# Patient Record
Sex: FEMALE | Race: WHITE | NOT HISPANIC OR LATINO | Employment: OTHER | URBAN - METROPOLITAN AREA
[De-identification: names, ages, dates, MRNs, and addresses within clinical notes are randomized per-mention and may not be internally consistent; named-entity substitution may affect disease eponyms.]

---

## 2017-01-03 ENCOUNTER — OFFICE VISIT (OUTPATIENT)
Dept: CARDIOLOGY | Facility: MEDICAL CENTER | Age: 82
End: 2017-01-03
Payer: MEDICARE

## 2017-01-03 VITALS
HEART RATE: 80 BPM | BODY MASS INDEX: 25.49 KG/M2 | HEIGHT: 65 IN | DIASTOLIC BLOOD PRESSURE: 78 MMHG | SYSTOLIC BLOOD PRESSURE: 150 MMHG | WEIGHT: 153 LBS | OXYGEN SATURATION: 98 %

## 2017-01-03 DIAGNOSIS — E02 SUBCLINICAL IODINE-DEFICIENCY HYPOTHYROIDISM: ICD-10-CM

## 2017-01-03 DIAGNOSIS — I10 ESSENTIAL HYPERTENSION: ICD-10-CM

## 2017-01-03 DIAGNOSIS — R00.2 PALPITATIONS: ICD-10-CM

## 2017-01-03 DIAGNOSIS — I63.039 CEREBROVASCULAR ACCIDENT (CVA) DUE TO THROMBOSIS OF CAROTID ARTERY, UNSPECIFIED BLOOD VESSEL LATERALITY (HCC): ICD-10-CM

## 2017-01-03 LAB — EKG IMPRESSION: NORMAL

## 2017-01-03 PROCEDURE — G8420 CALC BMI NORM PARAMETERS: HCPCS | Performed by: INTERNAL MEDICINE

## 2017-01-03 PROCEDURE — G8599 NO ASA/ANTIPLAT THER USE RNG: HCPCS | Performed by: INTERNAL MEDICINE

## 2017-01-03 PROCEDURE — 1036F TOBACCO NON-USER: CPT | Performed by: INTERNAL MEDICINE

## 2017-01-03 PROCEDURE — G8427 DOCREV CUR MEDS BY ELIG CLIN: HCPCS | Performed by: INTERNAL MEDICINE

## 2017-01-03 PROCEDURE — 99214 OFFICE O/P EST MOD 30 MIN: CPT | Performed by: INTERNAL MEDICINE

## 2017-01-03 RX ORDER — CHOLECALCIFEROL (VITAMIN D3) 1250 MCG
CAPSULE ORAL
COMMUNITY
End: 2017-08-17

## 2017-01-03 NOTE — Clinical Note
[facility]   [address], [address]  [place] [address]  Phone: [phone]  Fax: [phone]              Afshan Love  7/25/1927    Encounter Date: 1/3/2017    Moise Curtis M.D.          PROGRESS NOTE:  Subjective:   Afshan Love is a 89 y.o. female who presents today here for a second opinion secondary  to TIA and possible CVA possibly related to a fib. No palps. No syncope or presyncope. Minimal chest pain. Not taking her ASA. Event recorder without a fib. Feels well.     Past Medical History   Diagnosis Date   • Arthritis    • Unspecified disorder of thyroid    • CATARACT    • Snoring      sleep study done   • Sleep apnea      stopped cpap due to sinus problems   • Bronchitis    • Bruxism    • COPD (chronic obstructive pulmonary disease) (HCC)    • Pneumonia    • Restless leg syndrome    • Chickenpox    • Moroccan measles    • Hyperlipidemia    • Fibromyalgia      Past Surgical History   Procedure Laterality Date   • Appendectomy     • Thyroidectomy     • Other       eyelids fixed drooped below pupil   • Tonsillectomy and adenoidectomy     • Femur orif  5/22/2013     Performed by Johan Tsai M.D. at SURGERY [facility] ORS   • Cataract phaco with iol  12/11/2013     Performed by Oma Dockery M.D. at SURGERY SAME DAY [facility] ORS   • Pr remv 2nd cataract,corn-scler sectn     • Hip replacement, total       Family History   Problem Relation Age of Onset   • Cancer Sister    • Sleep Apnea Neg Hx    • Psychiatry Father      suicide     History   Smoking status   • Former Smoker -- 1.00 packs/day for 20 years   • Types: Cigarettes   • Quit date: 05/21/1961   Smokeless tobacco   • Never Used     Allergies   Allergen Reactions   • Nkda [No Known Drug Allergy]    • Other Environmental      Smoke, perfume     Outpatient Encounter Prescriptions as of 1/3/2017   Medication Sig Dispense Refill   • Cholecalciferol (VITAMIN D3) 29456 UNITS Cap Take  by  "mouth.     • BIOTIN PO Take  by mouth.     • levothyroxine (SYNTHROID) 75 MCG Tab Take 1 Tab by mouth Every morning on an empty stomach. 90 Tab 1   • cyanocobalamin (VITAMIN B-12) 1000 MCG/ML Solution INJECT 1 ML INTRAMUSCULARLY EVERY MONTH AS DIRECTED 1 Vial 11   • vitamin D, Ergocalciferol, (DRISDOL) 70642 UNITS Cap capsule Take 50,000 Units by mouth.  2   • GAVILYTE-C 240 G solution TAKE 240 ML BY MOUTH EVERY 15 MINUTES FOR 16 DOSES  0   • aspirin (ASA) 325 MG Tab Take 1 Tab by mouth every day. 30 Tab 3   • Non Formulary Request Take 2 Tabs by mouth 2 Times a Day. \"Chaniflow Chinese drug for constipation\"       No facility-administered encounter medications on file as of 1/3/2017.     ROS     Objective:   /78 mmHg  Pulse 80  Ht 1.651 m (5' 5\")  Wt 69.4 kg (153 lb)  BMI 25.46 kg/m2  SpO2 98%    Physical Exam   Constitutional: She is oriented to person, place, and time. She appears well-developed and well-nourished. No distress.   HENT:   Mouth/Throat: Oropharynx is clear and moist.   Eyes: Conjunctivae and EOM are normal.   Neck: Neck supple. No JVD present. No thyroid mass present.   Cardiovascular: Normal rate, regular rhythm, S1 normal, S2 normal and normal pulses.  PMI is not displaced.  Exam reveals no gallop.    No murmur heard.  Pulses:       Carotid pulses are 2+ on the right side, and 2+ on the left side.       Radial pulses are 2+ on the right side, and 2+ on the left side.        Femoral pulses are 2+ on the right side, and 2+ on the left side.       Dorsalis pedis pulses are 2+ on the right side, and 2+ on the left side.   No peripheral edema.   Pulmonary/Chest: Effort normal and breath sounds normal.   Abdominal: Soft. Normal appearance. She exhibits no abdominal bruit and no mass. There is no hepatosplenomegaly. There is no tenderness.   Musculoskeletal: Normal range of motion. She exhibits no edema.        Lumbar back: She exhibits no tenderness and no spasm.   Neurological: She is alert " and oriented to person, place, and time. She has normal strength.   Skin: Skin is warm and dry. No rash noted. No cyanosis. Nails show no clubbing.   Psychiatric: She has a normal mood and affect.       Assessment:     1. Palpitations  EKG   2. Essential hypertension  EKG   3. Cerebrovascular accident (CVA) due to thrombosis of carotid artery, unspecified blood vessel laterality (HCC)     4. Subclinical iodine-deficiency hypothyroidism         Medical Decision Making:  Today's Assessment / Status / Plan:     1. 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 Discussed use of ILR to see if has a fib and needs anticoagulation. She wishes to think about it. Also discussed the pathophysiology of a fib in causing CVA's.  2. F/u with PAB in 1 month.  3. Restart ASA.      Caitlin Rojas D.O.  [address]  [place] [address]  VIA In Basket     Damaris Curtis PA-C  [address]  [place] [address]  VIA In Basket

## 2017-01-03 NOTE — MR AVS SNAPSHOT
"Afshan Love   1/3/2017 9:00 AM   Office Visit   MRN: [id number]    Department:  [facility]   Dept Phone:  [phone]    Description:  Female : 1927   Provider:  Moise Curtis M.D.           Reason for Visit     New Patient           Allergies as of 1/3/2017     Allergen Noted Reactions    Nkda [No Known Drug Allergy] 05/10/2008       Other Environmental 12/10/2013       Smoke, perfume      You were diagnosed with     Palpitations   [785.1.ICD-9-CM]       Essential hypertension   [6848596]       Cerebrovascular accident (CVA) due to thrombosis of carotid artery, unspecified blood vessel laterality (HCC)   [4013844]       Subclinical iodine-deficiency hypothyroidism   [123949]         Vital Signs     Blood Pressure Pulse Height Weight Body Mass Index Oxygen Saturation    150/78 mmHg 80 1.651 m (5' 5\") 69.4 kg (153 lb) 25.46 kg/m2 98%    Smoking Status                   Former Smoker           Basic Information     Date Of Birth Sex Race Ethnicity Preferred Language    1927 Female White Non- English      Your appointments     Feb 15, 2017 10:40 AM   FOLLOW UP with [facility] (--)    [address], [facility] [address]  [facility] [address] [phone]            2017 10:00 AM   Established Patient with [facility]    [address] [facility] [address] [phone]           You will be receiving a confirmation call a few days before your appointment from our automated call confirmation system.              Problem List              ICD-10-CM Priority Class Noted - Resolved    Femur fracture, left (HCC) S72.92XA   2013 - Present    Hypothyroid E03.9   2013 - Present    HTN (hypertension) I10   2013 - Present    Fall W19.XXXA   2013 - Present    Cerebrovascular accident (CVA) due to embolism of cerebral artery (HCC) I63.40   3/30/2016 - Present "    Stroke (HCC) I63.9   4/27/2016 - Present    Hyperlipidemia LDL goal <70 E78.5   4/27/2016 - Present    Palpitations R00.2   4/27/2016 - Present    Obstructive sleep apnea syndrome G47.33   8/25/2016 - Present    Blunt trauma of sternum S29.8XXA   12/5/2016 - Present    Low serum vitamin D R79.89   12/5/2016 - Present      Health Maintenance        Date Due Completion Dates    IMM DTaP/Tdap/Td Vaccine (1 - Tdap) 7/25/1946 ---    PAP SMEAR 7/25/1948 ---    COLONOSCOPY 7/25/1977 ---    IMM ZOSTER VACCINE 7/25/1987 ---    IMM PNEUMOCOCCAL 65+ (ADULT) LOW/MEDIUM RISK SERIES (2 of 2 - PCV13) 11/21/2002 11/21/2001    MAMMOGRAM 1/7/2015 1/7/2014, 9/25/2008, 9/25/2008, 2/1/2006, 3/17/2004    BONE DENSITY 4/28/2016 4/28/2011, 10/20/2008            Results       Current Immunizations     Influenza TIV (IM) 11/19/2012    Influenza Vaccine Adult HD 12/5/2016    Pneumococcal polysaccharide vaccine (PPSV-23) 11/21/2001      Below and/or attached are the medications your provider expects you to take. Review all of your home medications and newly ordered medications with your provider and/or pharmacist. Follow medication instructions as directed by your provider and/or pharmacist. Please keep your medication list with you and share with your provider. Update the information when medications are discontinued, doses are changed, or new medications (including over-the-counter products) are added; and carry medication information at all times in the event of emergency situations     Allergies:  NKDA - (reactions not documented)     OTHER ENVIRONMENTAL - (reactions not documented)               Medications  Valid as of: January 03, 2017 -  9:35 AM    Generic Name Brand Name Tablet Size Instructions for use    Aspirin (Tab)  MG Take 1 Tab by mouth every day.        Biotin   Take  by mouth.        Cholecalciferol (Cap) Vitamin D3 62907 UNITS Take  by mouth.        Cyanocobalamin (Solution) VITAMIN B-12 1000 MCG/ML INJECT 1 ML  "INTRAMUSCULARLY EVERY MONTH AS DIRECTED        Ergocalciferol (Cap) DRISDOL 56894 UNITS Take 50,000 Units by mouth.        Levothyroxine Sodium (Tab) SYNTHROID 75 MCG Take 1 Tab by mouth Every morning on an empty stomach.        Non Formulary Request Non Formulary Request  Take 2 Tabs by mouth 2 Times a Day. \"Chaniflow Chinese drug for constipation\"        PEG 3350-KCl-NaBcb-NaCl-NaSulf (Recon Soln) GAVILYTE-C 240 G TAKE 240 ML BY MOUTH EVERY 15 MINUTES FOR 16 DOSES        .                 Medicines prescribed today were sent to:     [facility]/PHARMACY #[id number] - [place], [place] - [address] [facility]    [address] [facility] [place] [address]    Phone: [phone] Fax: [phone]    Open 24 Hours?: No      Medication refill instructions:       If your prescription bottle indicates you have medication refills left, it is not necessary to call your provider’s office. Please contact your pharmacy and they will refill your medication.    If your prescription bottle indicates you do not have any refills left, you may request refills at any time through one of the following ways: The online [employer] system (except Urgent Care), by calling your provider’s office, or by asking your pharmacy to contact your provider’s office with a refill request. Medication refills are processed only during regular business hours and may not be available until the next business day. Your provider may request additional information or to have a follow-up visit with you prior to refilling your medication.   *Please Note: Medication refills are assigned a new Rx number when refilled electronically. Your pharmacy may indicate that no refills were authorized even though a new prescription for the same medication is available at the pharmacy. Please request the medicine by name with the pharmacy before contacting your provider for a refill.           MyChart Status: Patient Declined        "

## 2017-01-03 NOTE — PROGRESS NOTES
Subjective:   Afshan Love is a 89 y.o. female who presents today here for a second opinion secondary  to TIA and possible CVA possibly related to a fib. No palps. No syncope or presyncope. Minimal chest pain. Not taking her ASA. Event recorder without a fib. Feels well.     Past Medical History   Diagnosis Date   • Arthritis    • Unspecified disorder of thyroid    • CATARACT    • Snoring      sleep study done   • Sleep apnea      stopped cpap due to sinus problems   • Bronchitis    • Bruxism    • COPD (chronic obstructive pulmonary disease) ([facility])    • Pneumonia    • Restless leg syndrome    • Chickenpox    • Thai measles    • Hyperlipidemia    • Fibromyalgia      Past Surgical History   Procedure Laterality Date   • Appendectomy     • Thyroidectomy     • Other       eyelids fixed drooped below pupil   • Tonsillectomy and adenoidectomy     • Femur orif  5/22/2013     Performed by Johan Tsai M.D. at SURGERY [facility]   • Cataract phaco with iol  12/11/2013     Performed by Oma Dockery M.D. at SURGERY SAME DAY [facility] ORS   • Pr remv 2nd cataract,corn-scler sectn     • Hip replacement, total       Family History   Problem Relation Age of Onset   • Cancer Sister    • Sleep Apnea Neg Hx    • Psychiatry Father      suicide     History   Smoking status   • Former Smoker -- 1.00 packs/day for 20 years   • Types: Cigarettes   • Quit date: 05/21/1961   Smokeless tobacco   • Never Used     Allergies   Allergen Reactions   • Nkda [No Known Drug Allergy]    • Other Environmental      Smoke, perfume     Outpatient Encounter Prescriptions as of 1/3/2017   Medication Sig Dispense Refill   • Cholecalciferol (VITAMIN D3) 21053 UNITS Cap Take  by mouth.     • BIOTIN PO Take  by mouth.     • levothyroxine (SYNTHROID) 75 MCG Tab Take 1 Tab by mouth Every morning on an empty stomach. 90 Tab 1   • cyanocobalamin (VITAMIN B-12) 1000 MCG/ML Solution INJECT 1 ML INTRAMUSCULARLY EVERY MONTH AS DIRECTED 1 Vial 11   •  "vitamin D, Ergocalciferol, (DRISDOL) 48049 UNITS Cap capsule Take 50,000 Units by mouth.  2   • GAVILYTE-C 240 G solution TAKE 240 ML BY MOUTH EVERY 15 MINUTES FOR 16 DOSES  0   • aspirin (ASA) 325 MG Tab Take 1 Tab by mouth every day. 30 Tab 3   • Non Formulary Request Take 2 Tabs by mouth 2 Times a Day. \"Chaniflow Chinese drug for constipation\"       No facility-administered encounter medications on file as of 1/3/2017.     ROS     Objective:   /78 mmHg  Pulse 80  Ht 1.651 m (5' 5\")  Wt 69.4 kg (153 lb)  BMI 25.46 kg/m2  SpO2 98%    Physical Exam   Constitutional: She is oriented to person, place, and time. She appears well-developed and well-nourished. No distress.   HENT:   Mouth/Throat: Oropharynx is clear and moist.   Eyes: Conjunctivae and EOM are normal.   Neck: Neck supple. No JVD present. No thyroid mass present.   Cardiovascular: Normal rate, regular rhythm, S1 normal, S2 normal and normal pulses.  PMI is not displaced.  Exam reveals no gallop.    No murmur heard.  Pulses:       Carotid pulses are 2+ on the right side, and 2+ on the left side.       Radial pulses are 2+ on the right side, and 2+ on the left side.        Femoral pulses are 2+ on the right side, and 2+ on the left side.       Dorsalis pedis pulses are 2+ on the right side, and 2+ on the left side.   No peripheral edema.   Pulmonary/Chest: Effort normal and breath sounds normal.   Abdominal: Soft. Normal appearance. She exhibits no abdominal bruit and no mass. There is no hepatosplenomegaly. There is no tenderness.   Musculoskeletal: Normal range of motion. She exhibits no edema.        Lumbar back: She exhibits no tenderness and no spasm.   Neurological: She is alert and oriented to person, place, and time. She has normal strength.   Skin: Skin is warm and dry. No rash noted. No cyanosis. Nails show no clubbing.   Psychiatric: She has a normal mood and affect.       Assessment:     1. Palpitations  EKG   2. Essential hypertension "  EKG   3. Cerebrovascular accident (CVA) due to thrombosis of carotid artery, unspecified blood vessel laterality (HCC)     4. Subclinical iodine-deficiency hypothyroidism         Medical Decision Making:  Today's Assessment / Status / Plan:     1. 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 Discussed use of ILR to see if has a fib and needs anticoagulation. She wishes to think about it. Also discussed the pathophysiology of a fib in causing CVA's.  2. F/u with PAB in 1 month.  3. Restart ASA.

## 2017-02-15 ENCOUNTER — OFFICE VISIT (OUTPATIENT)
Dept: CARDIOLOGY | Facility: MEDICAL CENTER | Age: 82
End: 2017-02-15
Payer: MEDICARE

## 2017-02-15 ENCOUNTER — TELEPHONE (OUTPATIENT)
Dept: CARDIOLOGY | Facility: MEDICAL CENTER | Age: 82
End: 2017-02-15

## 2017-02-15 VITALS
HEIGHT: 65 IN | WEIGHT: 156 LBS | DIASTOLIC BLOOD PRESSURE: 68 MMHG | OXYGEN SATURATION: 96 % | BODY MASS INDEX: 25.99 KG/M2 | SYSTOLIC BLOOD PRESSURE: 138 MMHG | HEART RATE: 72 BPM

## 2017-02-15 DIAGNOSIS — I63.429 CEREBROVASCULAR ACCIDENT (CVA) DUE TO EMBOLISM OF ANTERIOR CEREBRAL ARTERY, UNSPECIFIED BLOOD VESSEL LATERALITY (HCC): ICD-10-CM

## 2017-02-15 DIAGNOSIS — E78.5 HYPERLIPIDEMIA LDL GOAL <70: ICD-10-CM

## 2017-02-15 PROCEDURE — G8420 CALC BMI NORM PARAMETERS: HCPCS | Performed by: NURSE PRACTITIONER

## 2017-02-15 PROCEDURE — 4040F PNEUMOC VAC/ADMIN/RCVD: CPT | Performed by: NURSE PRACTITIONER

## 2017-02-15 PROCEDURE — G8482 FLU IMMUNIZE ORDER/ADMIN: HCPCS | Performed by: NURSE PRACTITIONER

## 2017-02-15 PROCEDURE — G8599 NO ASA/ANTIPLAT THER USE RNG: HCPCS | Performed by: NURSE PRACTITIONER

## 2017-02-15 PROCEDURE — 99214 OFFICE O/P EST MOD 30 MIN: CPT | Performed by: NURSE PRACTITIONER

## 2017-02-15 PROCEDURE — 1101F PT FALLS ASSESS-DOCD LE1/YR: CPT | Performed by: NURSE PRACTITIONER

## 2017-02-15 PROCEDURE — 1036F TOBACCO NON-USER: CPT | Performed by: NURSE PRACTITIONER

## 2017-02-15 PROCEDURE — G8432 DEP SCR NOT DOC, RNG: HCPCS | Performed by: NURSE PRACTITIONER

## 2017-02-15 ASSESSMENT — ENCOUNTER SYMPTOMS
WEIGHT LOSS: 0
ABDOMINAL PAIN: 0
FEVER: 0
VOMITING: 0
PALPITATIONS: 0
PSYCHIATRIC NEGATIVE: 1
HEADACHES: 0
BLURRED VISION: 0
CLAUDICATION: 0
SORE THROAT: 0
SHORTNESS OF BREATH: 0
SPEECH CHANGE: 0
CHILLS: 0
WHEEZING: 0
ORTHOPNEA: 0
SPUTUM PRODUCTION: 0
PND: 0
LOSS OF CONSCIOUSNESS: 0
COUGH: 0
NAUSEA: 0
HEARTBURN: 0
DIZZINESS: 0
FOCAL WEAKNESS: 0
MUSCULOSKELETAL NEGATIVE: 1
FLANK PAIN: 0
DOUBLE VISION: 0
SEIZURES: 0

## 2017-02-15 NOTE — MR AVS SNAPSHOT
"        Afshan Peñail   2/15/2017 10:40 AM   Office Visit   MRN: [id number]    Department:  [facility]   Dept Phone:  [phone]    Description:  Female : 1927   Provider:  [facility]           Reason for Visit     Follow-Up           Allergies as of 2/15/2017     Allergen Noted Reactions    Nkda [No Known Drug Allergy] 05/10/2008       Other Environmental 12/10/2013       Smoke, perfume      Vital Signs     Blood Pressure Pulse Height Weight Body Mass Index Oxygen Saturation    138/68 mmHg 72 1.651 m (5' 5\") 70.761 kg (156 lb) 25.96 kg/m2 96%    Smoking Status                   Former Smoker           Basic Information     Date Of Birth Sex Race Ethnicity Preferred Language    1927 Female White Non- English      Your appointments     2017 10:00 AM   Established Patient with Caitlin Rojas D.O.   [facility] ([place])    [address] [place] [id number] [phone]           You will be receiving a confirmation call a few days before your appointment from our automated call confirmation system.              Problem List              ICD-10-CM Priority Class Noted - Resolved    Femur fracture, left (CMS-HCC) S72.92XA   2013 - Present    Hypothyroid E03.9   2013 - Present    HTN (hypertension) I10   2013 - Present    Fall W19.XXXA   2013 - Present    Cerebrovascular accident (CVA) due to embolism of cerebral artery (CMS-HCC) I63.40   3/30/2016 - Present    Stroke (CMS-HCC) I63.9   2016 - Present    Hyperlipidemia LDL goal <70 E78.5   2016 - Present    Palpitations R00.2   2016 - Present    Obstructive sleep apnea syndrome G47.33   2016 - Present    Blunt trauma of sternum S29.8XXA   2016 - Present    Low serum vitamin D R79.89   2016 - Present      Health Maintenance        Date Due Completion Dates    IMM DTaP/Tdap/Td Vaccine (1 - Tdap) 1946 ---    PAP SMEAR 1948 ---    COLONOSCOPY " "7/25/1977 ---    IMM ZOSTER VACCINE 7/25/1987 ---    IMM PNEUMOCOCCAL 65+ (ADULT) LOW/MEDIUM RISK SERIES (2 of 2 - PCV13) 11/21/2002 11/21/2001    MAMMOGRAM 1/7/2015 1/7/2014, 9/25/2008, 9/25/2008, 2/1/2006, 3/17/2004    BONE DENSITY 4/28/2016 4/28/2011, 10/20/2008            Current Immunizations     Influenza TIV (IM) 11/19/2012    Influenza Vaccine Adult HD 12/5/2016    Pneumococcal polysaccharide vaccine (PPSV-23) 11/21/2001      Below and/or attached are the medications your provider expects you to take. Review all of your home medications and newly ordered medications with your provider and/or pharmacist. Follow medication instructions as directed by your provider and/or pharmacist. Please keep your medication list with you and share with your provider. Update the information when medications are discontinued, doses are changed, or new medications (including over-the-counter products) are added; and carry medication information at all times in the event of emergency situations     Allergies:  NKDA - (reactions not documented)     OTHER ENVIRONMENTAL - (reactions not documented)               Medications  Valid as of: February 15, 2017 - 11:23 AM    Generic Name Brand Name Tablet Size Instructions for use    Aspirin (Tab)  MG Take 1 Tab by mouth every day.        Biotin   Take  by mouth.        Cholecalciferol (Cap) Vitamin D3 66445 UNITS Take  by mouth.        Coenzyme Q10   Take  by mouth.        Cyanocobalamin (Solution) VITAMIN B-12 1000 MCG/ML INJECT 1 ML INTRAMUSCULARLY EVERY MONTH AS DIRECTED        Ergocalciferol (Cap) DRISDOL 68023 UNITS Take 50,000 Units by mouth.        Levothyroxine Sodium (Tab) SYNTHROID 75 MCG Take 1 Tab by mouth Every morning on an empty stomach.        Non Formulary Request Non Formulary Request  Take 2 Tabs by mouth 2 Times a Day. \"Chaniflow Chinese drug for constipation\"        PEG 3350-KCl-NaBcb-NaCl-NaSulf (Recon Soln) GAVILYTE-C 240 G TAKE 240 ML BY MOUTH EVERY 15 " MINUTES FOR 16 DOSES        .                 Medicines prescribed today were sent to:     [facility]/PHARMACY #[id number] - [place], [place] - [address]    [address] [place] [address]    Phone: [phone] Fax: [phone]    Open 24 Hours?: No      Medication refill instructions:       If your prescription bottle indicates you have medication refills left, it is not necessary to call your provider’s office. Please contact your pharmacy and they will refill your medication.    If your prescription bottle indicates you do not have any refills left, you may request refills at any time through one of the following ways: The online [employer] system (except Urgent Care), by calling your provider’s office, or by asking your pharmacy to contact your provider’s office with a refill request. Medication refills are processed only during regular business hours and may not be available until the next business day. Your provider may request additional information or to have a follow-up visit with you prior to refilling your medication.   *Please Note: Medication refills are assigned a new Rx number when refilled electronically. Your pharmacy may indicate that no refills were authorized even though a new prescription for the same medication is available at the pharmacy. Please request the medicine by name with the pharmacy before contacting your provider for a refill.           [employer] Status: Patient Declined

## 2017-02-15 NOTE — PROGRESS NOTES
Subjective:   Afshan Love is a 89 y.o. female who presents today for review of her cardiac rhythm status.  She was seen by Dr Curtis back in January and he recommended ILR to document whether PAF may have been the etiology of her TIA back in March of 2016.  She was to think about it and come back for reconsideration and timing of implantation.  Previous studies with TIA on 3/31/16 reflect the following:  CTA of neck  Impression        1. There is mild atherosclerosis at the origin of both internal carotid arteries but no significant stenosis is identified. There is no occlusion.  2.  There is no carotid artery or vertebral artery dissection.  3.  There are small subpleural nodules in both upper lobes as well as small groundglass nodules. These are most likely postinflammatory.       CT of brain  Impression        1. No acute abnormality.  2.  Moderate cerebral atrophy.  3.  Moderate chronic microvascular ischemic disease.     Echocardiography Laboratory  CONCLUSIONS  Normal left ventricular systolic function.  Left ventricular ejection fraction is visually estimated to be 60%.  Mild concentric left ventricular hypertrophy.  Aortic sclerosis without stenosis.  Mild aortic insufficiency.  Mild mitral annular calcification.  Thickened mitral valve leaflets with normal leaflet excursion with mild   prolapse of the posterior mitral valve leaflet.  Trace mitral regurgitation.  Mildly dilated left atrium.  Right heart pressures are normal.    AFSHAN LOVE  Exam Date:         03/31/2016                 Past Medical History   Diagnosis Date   • Arthritis    • Unspecified disorder of thyroid    • CATARACT    • Snoring      sleep study done   • Sleep apnea      stopped cpap due to sinus problems   • Bronchitis    • Bruxism    • COPD (chronic obstructive pulmonary disease) (CMS-[facility])    • Pneumonia    • Restless leg syndrome    • Chickenpox    • Urdu measles    • Hyperlipidemia    • Fibromyalgia      Past Surgical  "History   Procedure Laterality Date   • Appendectomy     • Thyroidectomy     • Other       eyelids fixed drooped below pupil   • Tonsillectomy and adenoidectomy     • Femur orif  5/22/2013     Performed by Johan Tsai M.D. at SURGERY [facility]   • Cataract phaco with iol  12/11/2013     Performed by Oma Dockery M.D. at SURGERY SAME DAY [facility] ORS   • Pr remv 2nd cataract,corn-scler sectn     • Hip replacement, total       Family History   Problem Relation Age of Onset   • Cancer Sister    • Sleep Apnea Neg Hx    • Psychiatry Father      suicide     History   Smoking status   • Former Smoker -- 1.00 packs/day for 20 years   • Types: Cigarettes   • Quit date: 05/21/1961   Smokeless tobacco   • Never Used     Allergies   Allergen Reactions   • Nkda [No Known Drug Allergy]    • Other Environmental      Smoke, perfume     Outpatient Encounter Prescriptions as of 2/15/2017   Medication Sig Dispense Refill   • Coenzyme Q10 (CO Q 10 PO) Take  by mouth.     • BIOTIN PO Take  by mouth.     • levothyroxine (SYNTHROID) 75 MCG Tab Take 1 Tab by mouth Every morning on an empty stomach. (Patient taking differently: Take 100 mcg by mouth Every morning on an empty stomach.) 90 Tab 1   • cyanocobalamin (VITAMIN B-12) 1000 MCG/ML Solution INJECT 1 ML INTRAMUSCULARLY EVERY MONTH AS DIRECTED 1 Vial 11   • vitamin D, Ergocalciferol, (DRISDOL) 30532 UNITS Cap capsule Take 50,000 Units by mouth.  2   • Non Formulary Request Take 2 Tabs by mouth 2 Times a Day. \"Chaniflow Chinese drug for constipation\"     • Cholecalciferol (VITAMIN D3) 31045 UNITS Cap Take  by mouth.     • GAVILYTE-C 240 G solution TAKE 240 ML BY MOUTH EVERY 15 MINUTES FOR 16 DOSES  0   • aspirin (ASA) 325 MG Tab Take 1 Tab by mouth every day. (Patient not taking: Reported on 2/15/2017) 30 Tab 3     No facility-administered encounter medications on file as of 2/15/2017.     Review of Systems   Constitutional: Positive for malaise/fatigue. Negative for fever, " "chills and weight loss.   HENT: Negative for congestion and sore throat.    Eyes: Negative for blurred vision and double vision.   Respiratory: Negative for cough, sputum production, shortness of breath and wheezing.    Cardiovascular: Negative for chest pain, palpitations, orthopnea, claudication, leg swelling and PND.   Gastrointestinal: Negative for heartburn, nausea, vomiting and abdominal pain.   Genitourinary: Negative for dysuria, frequency and flank pain.   Musculoskeletal: Negative.    Skin: Negative.    Neurological: Negative for dizziness, speech change, focal weakness, seizures, loss of consciousness and headaches.   Endo/Heme/Allergies: Negative.    Psychiatric/Behavioral: Negative.         Objective:   /68 mmHg  Pulse 72  Ht 1.651 m (5' 5\")  Wt 70.761 kg (156 lb)  BMI 25.96 kg/m2  SpO2 96%    Physical Exam   Constitutional: She is oriented to person, place, and time. She appears well-developed and well-nourished.   HENT:   Head: Normocephalic and atraumatic.   Eyes: Pupils are equal, round, and reactive to light.   Neck: Normal range of motion. Neck supple. No thyromegaly present.   Cardiovascular: Normal rate, regular rhythm, normal heart sounds and intact distal pulses.  Exam reveals no gallop and no friction rub.    No murmur heard.  Pulmonary/Chest: Effort normal and breath sounds normal. No respiratory distress. She has no wheezes. She has no rales. She exhibits no tenderness.   Abdominal: Soft. Bowel sounds are normal. She exhibits no distension. There is no tenderness. There is no guarding.   Musculoskeletal: Normal range of motion. She exhibits no edema.   Neurological: She is alert and oriented to person, place, and time.   Skin: Skin is warm and dry.   Psychiatric: She has a normal mood and affect.       Assessment:     1. Cerebrovascular accident (CVA) due to embolism of anterior cerebral artery, unspecified blood vessel laterality (CMS-HCC)     2. Hyperlipidemia LDL goal <70   "       Medical Decision Making:  Today's Assessment / Status / Plan:     As per Dr Curtis's note have recommended ILR to determine if TIA/CVA caused by AF.  No further recurrence of TIA like symptoms at this point.  Will schedule for ILR.  Risks of infection and bleeding were reviewed as well as follow up of device and extraction when battery depletion occurs.  She verbalized understanding and wishes to schedule the procedure.

## 2017-02-15 NOTE — Clinical Note
[facility]   [address], [address]  [place] [address]  Phone: [phone]  Fax: [phone]              Tierney Vasquez  7/25/1927    Encounter Date: 2/15/2017    SOL Mcdonald          PROGRESS NOTE:  Subjective:   Tierney Vasquez is a 89 y.o. female who presents today for review of her cardiac rhythm status.  She was seen by Dr Curtis back in January and he recommended ILR to document whether PAF may have been the etiology of her TIA back in March of 2016.  She was to think about it and come back for reconsideration and timing of implantation.  Previous studies with TIA on 3/31/16 reflect the following:  CTA of neck  Impression        1. There is mild atherosclerosis at the origin of both internal carotid arteries but no significant stenosis is identified. There is no occlusion.  2.  There is no carotid artery or vertebral artery dissection.  3.  There are small subpleural nodules in both upper lobes as well as small groundglass nodules. These are most likely postinflammatory.       CT of brain  Impression        1. No acute abnormality.  2.  Moderate cerebral atrophy.  3.  Moderate chronic microvascular ischemic disease.     Echocardiography Laboratory  CONCLUSIONS  Normal left ventricular systolic function.  Left ventricular ejection fraction is visually estimated to be 60%.  Mild concentric left ventricular hypertrophy.  Aortic sclerosis without stenosis.  Mild aortic insufficiency.  Mild mitral annular calcification.  Thickened mitral valve leaflets with normal leaflet excursion with mild   prolapse of the posterior mitral valve leaflet.  Trace mitral regurgitation.  Mildly dilated left atrium.  Right heart pressures are normal.    TIERNEY VASQUEZ  Exam Date:         03/31/2016                 Past Medical History   Diagnosis Date   • Arthritis    • Unspecified disorder of thyroid    • CATARACT    • Snoring      sleep study done   • Sleep  "apnea      stopped cpap due to sinus problems   • Bronchitis    • Bruxism    • COPD (chronic obstructive pulmonary disease) (CMS-[facility])    • Pneumonia    • Restless leg syndrome    • Chickenpox    • Prydeinig measles    • Hyperlipidemia    • Fibromyalgia      Past Surgical History   Procedure Laterality Date   • Appendectomy     • Thyroidectomy     • Other       eyelids fixed drooped below pupil   • Tonsillectomy and adenoidectomy     • Femur orif  5/22/2013     Performed by Johan Tsai M.D. at SURGERY [facility] ORS   • Cataract phaco with iol  12/11/2013     Performed by Oma Dockery M.D. at SURGERY SAME DAY [facility] ORS   • Pr remv 2nd cataract,corn-scler sectn     • Hip replacement, total       Family History   Problem Relation Age of Onset   • Cancer Sister    • Sleep Apnea Neg Hx    • Psychiatry Father      suicide     History   Smoking status   • Former Smoker -- 1.00 packs/day for 20 years   • Types: Cigarettes   • Quit date: 05/21/1961   Smokeless tobacco   • Never Used     Allergies   Allergen Reactions   • Nkda [No Known Drug Allergy]    • Other Environmental      Smoke, perfume     Outpatient Encounter Prescriptions as of 2/15/2017   Medication Sig Dispense Refill   • Coenzyme Q10 (CO Q 10 PO) Take  by mouth.     • BIOTIN PO Take  by mouth.     • levothyroxine (SYNTHROID) 75 MCG Tab Take 1 Tab by mouth Every morning on an empty stomach. (Patient taking differently: Take 100 mcg by mouth Every morning on an empty stomach.) 90 Tab 1   • cyanocobalamin (VITAMIN B-12) 1000 MCG/ML Solution INJECT 1 ML INTRAMUSCULARLY EVERY MONTH AS DIRECTED 1 Vial 11   • vitamin D, Ergocalciferol, (DRISDOL) 17399 UNITS Cap capsule Take 50,000 Units by mouth.  2   • Non Formulary Request Take 2 Tabs by mouth 2 Times a Day. \"Chaniflow Chinese drug for constipation\"     • Cholecalciferol (VITAMIN D3) 84723 UNITS Cap Take  by mouth.     • GAVILYTE-C 240 G solution TAKE 240 ML BY MOUTH EVERY 15 MINUTES FOR 16 DOSES  0   • aspirin " "(ASA) 325 MG Tab Take 1 Tab by mouth every day. (Patient not taking: Reported on 2/15/2017) 30 Tab 3     No facility-administered encounter medications on file as of 2/15/2017.     Review of Systems   Constitutional: Positive for malaise/fatigue. Negative for fever, chills and weight loss.   HENT: Negative for congestion and sore throat.    Eyes: Negative for blurred vision and double vision.   Respiratory: Negative for cough, sputum production, shortness of breath and wheezing.    Cardiovascular: Negative for chest pain, palpitations, orthopnea, claudication, leg swelling and PND.   Gastrointestinal: Negative for heartburn, nausea, vomiting and abdominal pain.   Genitourinary: Negative for dysuria, frequency and flank pain.   Musculoskeletal: Negative.    Skin: Negative.    Neurological: Negative for dizziness, speech change, focal weakness, seizures, loss of consciousness and headaches.   Endo/Heme/Allergies: Negative.    Psychiatric/Behavioral: Negative.         Objective:   /68 mmHg  Pulse 72  Ht 1.651 m (5' 5\")  Wt 70.761 kg (156 lb)  BMI 25.96 kg/m2  SpO2 96%    Physical Exam   Constitutional: She is oriented to person, place, and time. She appears well-developed and well-nourished.   HENT:   Head: Normocephalic and atraumatic.   Eyes: Pupils are equal, round, and reactive to light.   Neck: Normal range of motion. Neck supple. No thyromegaly present.   Cardiovascular: Normal rate, regular rhythm, normal heart sounds and intact distal pulses.  Exam reveals no gallop and no friction rub.    No murmur heard.  Pulmonary/Chest: Effort normal and breath sounds normal. No respiratory distress. She has no wheezes. She has no rales. She exhibits no tenderness.   Abdominal: Soft. Bowel sounds are normal. She exhibits no distension. There is no tenderness. There is no guarding.   Musculoskeletal: Normal range of motion. She exhibits no edema.   Neurological: She is alert and oriented to person, place, and " time.   Skin: Skin is warm and dry.   Psychiatric: She has a normal mood and affect.       Assessment:     1. Cerebrovascular accident (CVA) due to embolism of anterior cerebral artery, unspecified blood vessel laterality (CMS-HCC)     2. Hyperlipidemia LDL goal <70         Medical Decision Making:  Today's Assessment / Status / Plan:     As per Dr Curtis's note have recommended ILR to determine if TIA/CVA caused by AF.  No further recurrence of TIA like symptoms at this point.  Will schedule for ILR.  Risks of infection and bleeding were reviewed as well as follow up of device and extraction when battery depletion occurs.  She verbalized understanding and wishes to schedule the procedure.        Caitlin Rojas D.O.  [address] [place] [address]  VIA In Basket

## 2017-02-17 NOTE — TELEPHONE ENCOUNTER
Patient scheduled for loop insert on 3-16-17 at [facility] with Dr. Curtis.

## 2017-02-28 NOTE — TELEPHONE ENCOUNTER
Procedure moved to 12:00. Spoke with patient - arrival time has changed to 10:00. Changed time with [employer].

## 2017-03-06 ENCOUNTER — OFFICE VISIT (OUTPATIENT)
Dept: MEDICAL GROUP | Facility: PHYSICIAN GROUP | Age: 82
End: 2017-03-06
Payer: MEDICARE

## 2017-03-06 VITALS
OXYGEN SATURATION: 97 % | WEIGHT: 159 LBS | RESPIRATION RATE: 14 BRPM | HEIGHT: 64 IN | DIASTOLIC BLOOD PRESSURE: 60 MMHG | HEART RATE: 78 BPM | SYSTOLIC BLOOD PRESSURE: 108 MMHG | BODY MASS INDEX: 27.14 KG/M2 | TEMPERATURE: 97.7 F

## 2017-03-06 DIAGNOSIS — Z23 NEED FOR PROPHYLACTIC VACCINATION WITH STREPTOCOCCUS PNEUMONIAE (PNEUMOCOCCUS) AND INFLUENZA VACCINES: ICD-10-CM

## 2017-03-06 DIAGNOSIS — H91.93 DIFFICULTY HEARING, BILATERAL: ICD-10-CM

## 2017-03-06 DIAGNOSIS — H91.93 HEARING DIFFICULTY OF BOTH EARS: ICD-10-CM

## 2017-03-06 PROCEDURE — 90670 PCV13 VACCINE IM: CPT | Performed by: NURSE PRACTITIONER

## 2017-03-06 PROCEDURE — G0009 ADMIN PNEUMOCOCCAL VACCINE: HCPCS | Performed by: NURSE PRACTITIONER

## 2017-03-06 PROCEDURE — 69210 REMOVE IMPACTED EAR WAX UNI: CPT | Performed by: NURSE PRACTITIONER

## 2017-03-06 RX ORDER — CHOLECALCIFEROL (VITAMIN D3) 125 MCG
4000-8000 CAPSULE ORAL DAILY
COMMUNITY
End: 2018-09-07

## 2017-03-06 NOTE — ASSESSMENT & PLAN NOTE
Patient requesting second pneumonia vaccine today. Denies allergies to eggs or any untoward reaction to her previous pneumonia vaccine. Denies fever or chills or upper respiratory symptoms today.

## 2017-03-06 NOTE — PROGRESS NOTES
"Chief Complaint   Patient presents with   • Hearing Problem   • Immunizations       Subjective:   Afshan Love is a 89 y.o. female here today for evaluation and management of:    Hearing difficulty of both ears  Patient states that she has had difficulty over the last couple of weeks with her hearing despite her hearing aids. States that she thinks maybe she has some earwax in her ears and would like that checked. Denies fever chills or upper respiratory infection or ringing in the ears.    Need for prophylactic vaccination with Streptococcus pneumoniae (Pneumococcus) and Influenza vaccines  Patient requesting second pneumonia vaccine today. Denies allergies to eggs or any untoward reaction to her previous pneumonia vaccine. Denies fever or chills or upper respiratory symptoms today.           Current medicines (including changes today)  Current Outpatient Prescriptions   Medication Sig Dispense Refill   • aspirin EC (ECOTRIN) 81 MG Tablet Delayed Response Take 81 mg by mouth every day.     • Cholecalciferol (VITAMIN D3) 2000 UNITS Tab Take 4,000 Units by mouth every day.     • Cholecalciferol (VITAMIN D3) 29541 UNITS Cap Take  by mouth.     • BIOTIN PO Take  by mouth.     • levothyroxine (SYNTHROID) 75 MCG Tab Take 1 Tab by mouth Every morning on an empty stomach. (Patient taking differently: Take 100 mcg by mouth Every morning on an empty stomach.) 90 Tab 1   • Coenzyme Q10 (CO Q 10 PO) Take  by mouth.     • cyanocobalamin (VITAMIN B-12) 1000 MCG/ML Solution INJECT 1 ML INTRAMUSCULARLY EVERY MONTH AS DIRECTED 1 Vial 11   • vitamin D, Ergocalciferol, (DRISDOL) 43018 UNITS Cap capsule Take 50,000 Units by mouth.  2   • GAVILYTE-C 240 G solution TAKE 240 ML BY MOUTH EVERY 15 MINUTES FOR 16 DOSES  0   • aspirin (ASA) 325 MG Tab Take 1 Tab by mouth every day. (Patient not taking: Reported on 2/15/2017) 30 Tab 3   • Non Formulary Request Take 2 Tabs by mouth 2 Times a Day. \"Chaniflow Chinese drug for " "constipation\"       No current facility-administered medications for this visit.     She  has a past medical history of Arthritis; Unspecified disorder of thyroid; CATARACT; Snoring; Sleep apnea; Bronchitis; Bruxism; COPD (chronic obstructive pulmonary disease) (CMS-[facility]); Pneumonia; Restless leg syndrome; Chickenpox; American measles; Hyperlipidemia; and Fibromyalgia.    ROS + decreased hearing bilaterally. Request for pneumonia shot.  No chest pain, no shortness of breath, no abdominal pain       Objective:     Blood pressure 108/60, pulse 78, temperature 36.5 °C (97.7 °F), resp. rate 14, height 1.626 m (5' 4\"), weight 72.122 kg (159 lb), SpO2 97 %. Body mass index is 27.28 kg/(m^2).   Physical Exam:  Constitutional: Alert, no distress.  Skin: Warm, dry, good turgor,no cyanosis, no rashes in visible areas.  Eye: Equal, round and reactive, conjunctiva clear, lids normal.  Ears: No tenderness, no discharge. Left  external canal has small area of excoriation next to TM.  Right canal has moderate amount of dark cerumen.  Tympanic membranes are without any inflammation, no effusion.  Gross auditory acuity is intact.  Nose: symmetrical without tenderness, no discharge.  Mouth/Throat: lips without lesion.  Oropharynx clear.    Neck: Trachea midline, no masses, no obvious thyroid enlargement.. No cervical or supraclavicular lymphadenopathy. Range of motion within normal limits.  Neuro: Cranial nerves 2-12 grossly intact.  No sensory deficit.  Respiratory: Unlabored respiratory effort, lungs clear to auscultation, no wheezes, no ronchi.  Cardiovascular: Normal S1, S2, no murmur, no edema.  Psych: Alert and oriented x3, normal affect and mood and judgement.        Assessment and Plan:   The following treatment plan was discussed    1. Need for prophylactic vaccination with Streptococcus pneumoniae (Pneumococcus) and Influenza vaccines  Patient requesting second pneumonia vaccine. No current infectious process. Afebrile.   I " have placed the below orders and discussed them with an approved delegating provider.  The MA is performing the below orders under the direction of Triston Summers M.D..  PNEUMOCOCCAL CONJUGATE VACCINE 13-VALENT    2. Hearing difficulty of both ears  This is a new problem to me. Acute. Patient has cerumen impaction in the right ear. This was irrigated and then cerumen was removed with a small curette. Left ear without cerumen but a small excoriation at the inner canal was noted. This is most likely due from the hearing aid device patient states that she often puts it in quite forcefully. Discussed how to place her hearing aids and also we checked the battery on the left ear which was not working. We replaced the battery together. We'll monitor and follow.      Followup: No Follow-up on file.

## 2017-03-06 NOTE — ASSESSMENT & PLAN NOTE
Patient states that she has had difficulty over the last couple of weeks with her hearing despite her hearing aids. States that she thinks maybe she has some earwax in her ears and would like that checked. Denies fever chills or upper respiratory infection or ringing in the ears.

## 2017-03-07 ENCOUNTER — SUPERVISING PHYSICIAN REVIEW (OUTPATIENT)
Dept: MEDICAL GROUP | Facility: PHYSICIAN GROUP | Age: 82
End: 2017-03-07

## 2017-03-07 NOTE — PROGRESS NOTES
I have reviewed and agree with history, assessment and plan for office encounter on 3/6/2017 with DIGNA Pak  Face to face encounter/direct observation: No  Suggested changes to plan or follow-up: none  I have reviewed this note on 3/7/17   Caitlin Rojas D.O.

## 2017-03-14 ENCOUNTER — TELEPHONE (OUTPATIENT)
Dept: CARDIOLOGY | Facility: MEDICAL CENTER | Age: 82
End: 2017-03-14

## 2017-03-14 NOTE — TELEPHONE ENCOUNTER
Pt. Has had an URI for about a week with cough, nasal drainage, congestion. She also noted that she changed her pillow 3 days ago and has been frequently dizzy since then. She thinks dizziness is due to neck issues or congestion. Offered pt. An appointment with [facility], but she will try to see PCP instead. If she can't schedule with PCP she will call me back.  She wants to cancel procedure this week and hopefully reschedule it next week. To Marah to call.

## 2017-03-14 NOTE — TELEPHONE ENCOUNTER
----- Message from Anna Walls sent at 3/14/2017  8:17 AM PDT -----  Regarding: Patient wants to discuss cancelling procedure  LOGAN/Silvia    Patient is scheduled for a Loop Recorder on Thursday, 3/16. She has a cold, has been coughing and is also dizzy. She wants to discuss cancelling the procedure and can be reached at [phone].

## 2017-03-23 ENCOUNTER — HOSPITAL ENCOUNTER (OUTPATIENT)
Facility: MEDICAL CENTER | Age: 82
End: 2017-03-23
Attending: INTERNAL MEDICINE | Admitting: INTERNAL MEDICINE
Payer: MEDICARE

## 2017-03-23 VITALS
HEART RATE: 74 BPM | TEMPERATURE: 98 F | WEIGHT: 159.17 LBS | OXYGEN SATURATION: 98 % | RESPIRATION RATE: 18 BRPM | BODY MASS INDEX: 27.17 KG/M2 | HEIGHT: 64 IN | SYSTOLIC BLOOD PRESSURE: 133 MMHG | DIASTOLIC BLOOD PRESSURE: 59 MMHG

## 2017-03-23 PROBLEM — I63.9 CVA (CEREBRAL VASCULAR ACCIDENT) (HCC): Status: ACTIVE | Noted: 2017-03-23

## 2017-03-23 PROCEDURE — 33282 HCHG CARDIAC LR INSERTION: CPT

## 2017-03-23 PROCEDURE — C1764 EVENT RECORDER, CARDIAC: HCPCS

## 2017-03-23 PROCEDURE — 160002 HCHG RECOVERY MINUTES (STAT)

## 2017-03-23 PROCEDURE — 700101 HCHG RX REV CODE 250

## 2017-03-23 RX ORDER — LIDOCAINE HYDROCHLORIDE AND EPINEPHRINE BITARTRATE 20; .01 MG/ML; MG/ML
INJECTION, SOLUTION SUBCUTANEOUS
Status: COMPLETED
Start: 2017-03-23 | End: 2017-03-23

## 2017-03-23 RX ADMIN — LIDOCAINE HYDROCHLORIDE AND EPINEPHRINE: 20; 10 INJECTION, SOLUTION INFILTRATION; PERINEURAL at 13:30

## 2017-03-23 ASSESSMENT — PAIN SCALES - GENERAL
PAINLEVEL_OUTOF10: 0
PAINLEVEL_OUTOF10: 0

## 2017-03-23 NOTE — DISCHARGE INSTRUCTIONS
SPECIAL INSTRUCTIONS: Refer to information in product box.    DIET: To avoid nausea, slowly advance diet as tolerated, avoiding spicy or greasy foods for the first day.  Add more substantial food to your diet according to your physician's instructions.  Babies can be fed formula or breast milk as soon as they are hungry.  INCREASE FLUIDS AND FIBER TO AVOID CONSTIPATION.    SURGICAL DRESSING/BATHING: May remove dressing in 24 hours. May shower in 24 hours. Do not submerge site until site is healed.     FOLLOW-UP APPOINTMENT:  A follow-up appointment should be arranged with your doctor; call to schedule.    You should CALL YOUR PHYSICIAN if you develop:  Fever greater than 101 degrees F.  Pain not relieved by medication, or persistent nausea or vomiting.  Excessive bleeding (blood soaking through dressing) or unexpected drainage from the wound.  Extreme redness or swelling around the incision site, drainage of pus or foul smelling drainage.  Inability to urinate or empty your bladder within 8 hours.  Problems with breathing or chest pain.    You should call 911 if you develop problems with breathing or chest pain.  If you are unable to contact your doctor or surgical center, you should go to the nearest emergency room or urgent care center.  Physician's telephone #: [phone]    If any questions arise, call your doctor.  If your doctor is not available, please feel free to call the Surgical Center at [phone].  The Center is open Monday through Friday from 7AM to 7PM.  You can also call the [employer] HOTLINE open 24 hours/day, 7 days/week and speak to a nurse at [phone], or toll free at [phone].    A registered nurse may call you a few days after your surgery to see how you are doing after your procedure.    MEDICATIONS: Resume taking daily medication.  Take prescribed pain medication with food.  If no medication is prescribed, you may take non-aspirin pain medication if needed.  PAIN MEDICATION CAN  BE VERY CONSTIPATING.  Take a stool softener or laxative such as senokot, pericolace, or milk of magnesia if needed.    If your physician has prescribed pain medication that includes Acetaminophen (Tylenol), do not take additional Acetaminophen (Tylenol) while taking the prescribed medication.    Depression / Suicide Risk    As you are discharged from this [facility] facility, it is important to learn how to keep safe from harming yourself.    Recognize the warning signs:  · Abrupt changes in personality, positive or negative- including increase in energy   · Giving away possessions  · Change in eating patterns- significant weight changes-  positive or negative  · Change in sleeping patterns- unable to sleep or sleeping all the time   · Unwillingness or inability to communicate  · Depression  · Unusual sadness, discouragement and loneliness  · Talk of wanting to die  · Neglect of personal appearance   · Rebelliousness- reckless behavior  · Withdrawal from people/activities they love  · Confusion- inability to concentrate     If you or a loved one observes any of these behaviors or has concerns about self-harm, here's what you can do:  · Talk about it- your feelings and reasons for harming yourself  · Remove any means that you might use to hurt yourself (examples: pills, rope, extension cords, firearm)  · Get professional help from the community (Mental Health, Substance Abuse, psychological counseling)  · Do not be alone:Call your Safe Contact- someone whom you trust who will be there for you.  · Call your local CRISIS HOTLINE 443-0073 or 142-824-6118  · Call your local Children's Mobile Crisis Response Team [place] (331) 797-9354 or www.Ditto  · Call the toll free National Suicide Prevention Hotlines   · National Suicide Prevention Lifeline 889-148-RUKX (8728)  · National Hope Line Network 800-SUICIDE (020-0883)

## 2017-03-23 NOTE — PROGRESS NOTES
Report received and Pt admitted to PPU 8 s/p loop recorder site. Site CDI with no s/s of bleeding or hematoma. Pt attached to all leads and monitors. VSS with no complaints of pain or nausea. Fluids and food offered. Orders reviewed.  Pt. Loop recorder site CDI with no s/s of bleeding or hematoma. Pt. Meets discharge criteria. Pt. And responsible adult given discharge instructions. Pt. And responsible adult educated and all questions answered. Signed copy on chart. Pt. Belongings with Pt and Pt. Transported via wheel chair to car with escort.

## 2017-03-23 NOTE — OP REPORT
"EP Pre-Procedure Note    Date of service: 3/23/2017    Indication for procedure: TIA    HPI:   89 yo F with history of TIA, cryptogenic, here for ILR for detection of sub-clinical atrial fibrillation    ROS: Negative for orthopnea, PND, palpitations, chest pain    Physical Exam:  Filed Vitals:    03/23/17 1250 03/23/17 1302   BP:  149/62   Pulse:  75   Temp:  36.3 °C (97.4 °F)   Resp:  16   Height:  1.626 m (5' 4\")   Weight: 72.2 kg (159 lb 2.8 oz)    SpO2:  97%     Gen: NAD, conversant  HEENT: PERRL, EOMI  LUNGS: CTA B, no w/r/r  CV: RRR, no m/r/g, no JVD  Abd: Soft, NT/ND, +BS  Ext: no edema, warm and well perfused    Labs reviewed    Impression/Recs:  1)TIA  -Proceed with ILR implantation    Kaylee Valerio MD    "

## 2017-03-23 NOTE — IP AVS SNAPSHOT
3/23/2017          Afshan Elmore Ivan  [address]  [facility] [address]    Dear Afshan:    [facility] wants to ensure your discharge home is safe and you or your loved ones have had all your questions answered regarding your care after you leave the hospital.    You may receive a telephone call within two days of your discharge.  This call is to make certain you understand your discharge instructions as well as ensure we provided you with the best care possible during your stay with us.     The call will only last approximately 3-5 minutes and will be done by a nurse.    Once again, we want to ensure your discharge home is safe and that you have a clear understanding of any next steps in your care.  If you have any questions or concerns, please do not hesitate to contact us, we are here for you.  Thank you for choosing [facility] for your healthcare needs.    Sincerely,    Jair Lilly    [facility]

## 2017-03-23 NOTE — OP REPORT
PROCEDURE PERFORMED: Implantable Loop Recorder    DATE OF SERVICE: 3/23/2017    : Kaylee Valerio MD    ASSISTANT: None    ANESTHESIA: Local    EBL: <5 cc    SPECIMENS: None    INDICATTION: Cryptogenic stroke    DESCRIPTION OF PROCEDURE:  After informed written consent, the patient was brought to the cath lab pre/post procedure area. The patient was prepped and draped in the usual sterile fashion. The procedure was performed with local anesthetic. Using the supplied incision tool, a <1 cm incision was made in the skin about 2 cm leftward and lateral to the sternal border. Using the supplied insertion tool, a tunnel was made in the subcutaneous tissue at a 45 degree angle to the sternum and the device was inserted with the supplied plunger. Manual compression was used until hemostasis achieved. Steri-strips and sterile dressing was applied.    IMPLANTED DEVICE INFORMATION:  Model: [employer] Genome LINQ   Serial number: [id number]     IMPRESSIONS:  1. Successful implantable loop recorder implantation    RECOMMENDATIONS:  1. Routine follow-up and device interrogation

## 2017-03-23 NOTE — IP AVS SNAPSHOT
" Home Care Instructions                                                                                                                Name:Afshan Love  Medical Record Number:[id number]  CSN: [id number]    YOB: 1927   Age: 89 y.o.  Sex: female  HT:1.626 m (5' 4\") WT: 72.2 kg (159 lb 2.8 oz)          Admit Date: 3/23/2017     Discharge Date:   Today's Date: 3/23/2017  Attending Doctor:  Moise Curtis M.D.                  Allergies:  Nkda and Other environmental                Discharge Instructions         SPECIAL INSTRUCTIONS: Refer to information in product box.    DIET: To avoid nausea, slowly advance diet as tolerated, avoiding spicy or greasy foods for the first day.  Add more substantial food to your diet according to your physician's instructions.  Babies can be fed formula or breast milk as soon as they are hungry.  INCREASE FLUIDS AND FIBER TO AVOID CONSTIPATION.    SURGICAL DRESSING/BATHING: May remove dressing in 24 hours. May shower in 24 hours. Do not submerge site until site is healed.     FOLLOW-UP APPOINTMENT:  A follow-up appointment should be arranged with your doctor; call to schedule.    You should CALL YOUR PHYSICIAN if you develop:  Fever greater than 101 degrees F.  Pain not relieved by medication, or persistent nausea or vomiting.  Excessive bleeding (blood soaking through dressing) or unexpected drainage from the wound.  Extreme redness or swelling around the incision site, drainage of pus or foul smelling drainage.  Inability to urinate or empty your bladder within 8 hours.  Problems with breathing or chest pain.    You should call 911 if you develop problems with breathing or chest pain.  If you are unable to contact your doctor or surgical center, you should go to the nearest emergency room or urgent care center.  Physician's telephone #: [phone]    If any questions arise, call your doctor.  If your doctor is not available, please feel free to call the Surgical Center " at [phone].  The Center is open Monday through Friday from 7AM to 7PM.  You can also call the HEALTH HOTLINE open 24 hours/day, 7 days/week and speak to a nurse at [phone], or toll free at [phone].    A registered nurse may call you a few days after your surgery to see how you are doing after your procedure.    MEDICATIONS: Resume taking daily medication.  Take prescribed pain medication with food.  If no medication is prescribed, you may take non-aspirin pain medication if needed.  PAIN MEDICATION CAN BE VERY CONSTIPATING.  Take a stool softener or laxative such as senokot, pericolace, or milk of magnesia if needed.    If your physician has prescribed pain medication that includes Acetaminophen (Tylenol), do not take additional Acetaminophen (Tylenol) while taking the prescribed medication.    Depression / Suicide Risk    As you are discharged from this [facility] facility, it is important to learn how to keep safe from harming yourself.    Recognize the warning signs:  · Abrupt changes in personality, positive or negative- including increase in energy   · Giving away possessions  · Change in eating patterns- significant weight changes-  positive or negative  · Change in sleeping patterns- unable to sleep or sleeping all the time   · Unwillingness or inability to communicate  · Depression  · Unusual sadness, discouragement and loneliness  · Talk of wanting to die  · Neglect of personal appearance   · Rebelliousness- reckless behavior  · Withdrawal from people/activities they love  · Confusion- inability to concentrate     If you or a loved one observes any of these behaviors or has concerns about self-harm, here's what you can do:  · Talk about it- your feelings and reasons for harming yourself  · Remove any means that you might use to hurt yourself (examples: pills, rope, extension cords, firearm)  · Get professional help from the community (Mental Health, Substance Abuse, psychological  "counseling)  · Do not be alone:Call your Safe Contact- someone whom you trust who will be there for you.  · Call your local CRISIS HOTLINE 211-2702 or 043-046-4160  · Call your local Children's Mobile Crisis Response Team [place] (137) 209-0780 or www.[employer]  · Call the toll free National Suicide Prevention Hotlines   · National Suicide Prevention Lifeline 603-250-DQTW (2468)  · [employer] Hope Line Network 800-SUICIDE (229-2042)       Medication List      ASK your doctor about these medications        Instructions    aspirin EC 81 MG Tbec   Commonly known as:  ECOTRIN    Take 81 mg by mouth every day.   Dose:  81 mg       BIOTIN PO    Take  by mouth.       CO Q 10 PO    Take  by mouth.       cyanocobalamin 1000 MCG/ML Soln   Commonly known as:  VITAMIN B-12    INJECT 1 ML INTRAMUSCULARLY EVERY MONTH AS DIRECTED       levothyroxine 75 MCG Tabs   Commonly known as:  SYNTHROID    Take 1 Tab by mouth Every morning on an empty stomach.   Dose:  75 mcg       Non Formulary Request    Take 2 Tabs by mouth 2 Times a Day. \"Chaniflow Chinese drug for constipation\"   Dose:  2 Tab       vitamin D (Ergocalciferol) 99991 UNITS Caps capsule   Commonly known as:  DRISDOL    Take 50,000 Units by mouth.   Dose:  24872 Units       * Vitamin D3 89246 UNITS Caps    Take  by mouth.       * Vitamin D3 2000 UNITS Tabs    Take 4,000 Units by mouth every day.   Dose:  4000 Units       * Notice:  This list has 2 medication(s) that are the same as other medications prescribed for you. Read the directions carefully, and ask your doctor or other care provider to review them with you.            Medication Information     Above and/or attached are the medications your physician expects you to take upon discharge. Review all of your home medications and newly ordered medications with your doctor and/or pharmacist. Follow medication instructions as directed by your doctor and/or pharmacist. Please keep your medication list with you " and share with your physician. Update the information when medications are discontinued, doses are changed, or new medications (including over-the-counter products) are added; and carry medication information at all times in the event of emergency situations.        Resources     Quit Smoking / Tobacco Use:    I understand the use of any tobacco products increases my chance of suffering from future heart disease or stroke and could cause other illnesses which may shorten my life. Quitting the use of tobacco products is the single most important thing I can do to improve my health. For further information on smoking / tobacco cessation call a Toll Free Quit Line at 1-636.817.1504 (*National Cancer Destrehan) or 1-458.350.4727 (American Lung Association) or you can access the web based program at www.lungAccolo.org.    [place] Tobacco Users Help Line:  (191) 811-4138       Toll Free: 1-292.588.3906  Quit Tobacco Program [facility] (867)351-6866    Crisis Hotline:    [place] Crisis Hotline:  2-756-TDAWAKP or 1-807.961.8619    [place] Crisis Hotline:    1-600.984.9765 or 898-735-9553    Discharge Survey:   Thank you for choosing [facility]. We hope we did everything we could to make your hospital stay a pleasant one. You may be receiving a survey and we would appreciate your time and participation in answering the questions. Your input is very valuable to us in our efforts to improve our service to our patients and their families.            Signatures     My signature on this form indicates that:    1. I acknowledge receipt and understanding of these Home Care Instruction.  2. My questions regarding this information have been answered to my satisfaction.  3. I have formulated a plan with my discharge nurse to obtain my prescribed medications for home.    __________________________________      __________________________________                   Patient Signature                                  Guardian/Responsible Adult Signature      __________________________________                 __________       ________                       Nurse Signature                                               Date                 Time

## 2017-04-04 ENCOUNTER — TELEPHONE (OUTPATIENT)
Dept: CARDIOLOGY | Facility: MEDICAL CENTER | Age: 82
End: 2017-04-04

## 2017-04-04 NOTE — TELEPHONE ENCOUNTER
----- Message from Kaylee Valerio M.D. sent at 4/4/2017  9:09 AM PDT -----  Can you schedule her to come in to see me to discuss starting anticoagulation?    ----- Message -----     From: Oma Meadows, Med Ass't     Sent: 4/3/2017   2:26 PM       To: Kaylee Valerio M.D.    Patient's loop recorder transmitted via home monitor-- patient did have AF episode on 3/31 lasting 2 minutes.      Thanks  Oma

## 2017-04-11 ENCOUNTER — OFFICE VISIT (OUTPATIENT)
Dept: CARDIOLOGY | Facility: MEDICAL CENTER | Age: 82
End: 2017-04-11
Payer: MEDICARE

## 2017-04-11 VITALS
SYSTOLIC BLOOD PRESSURE: 116 MMHG | HEIGHT: 64 IN | OXYGEN SATURATION: 94 % | HEART RATE: 72 BPM | BODY MASS INDEX: 26.84 KG/M2 | WEIGHT: 157.2 LBS | DIASTOLIC BLOOD PRESSURE: 70 MMHG

## 2017-04-11 DIAGNOSIS — I63.40 CEREBROVASCULAR ACCIDENT (CVA) DUE TO EMBOLISM OF CEREBRAL ARTERY (HCC): ICD-10-CM

## 2017-04-11 DIAGNOSIS — I48.0 PAF (PAROXYSMAL ATRIAL FIBRILLATION) (HCC): Primary | ICD-10-CM

## 2017-04-11 DIAGNOSIS — Z45.09 ENCOUNTER FOR LOOP RECORDER CHECK: ICD-10-CM

## 2017-04-11 DIAGNOSIS — Z79.01 CHRONIC ANTICOAGULATION: ICD-10-CM

## 2017-04-11 PROCEDURE — 93000 ELECTROCARDIOGRAM COMPLETE: CPT | Performed by: INTERNAL MEDICINE

## 2017-04-11 PROCEDURE — G8432 DEP SCR NOT DOC, RNG: HCPCS | Performed by: INTERNAL MEDICINE

## 2017-04-11 PROCEDURE — 99214 OFFICE O/P EST MOD 30 MIN: CPT | Performed by: INTERNAL MEDICINE

## 2017-04-11 PROCEDURE — 1101F PT FALLS ASSESS-DOCD LE1/YR: CPT | Performed by: INTERNAL MEDICINE

## 2017-04-11 PROCEDURE — 4040F PNEUMOC VAC/ADMIN/RCVD: CPT | Performed by: INTERNAL MEDICINE

## 2017-04-11 PROCEDURE — G8419 CALC BMI OUT NRM PARAM NOF/U: HCPCS | Performed by: INTERNAL MEDICINE

## 2017-04-11 PROCEDURE — 1036F TOBACCO NON-USER: CPT | Performed by: INTERNAL MEDICINE

## 2017-04-11 PROCEDURE — G8598 ASA/ANTIPLAT THER USED: HCPCS | Performed by: INTERNAL MEDICINE

## 2017-04-11 RX ORDER — ERYTHROMYCIN 5 MG/G
OINTMENT OPHTHALMIC
Refills: 0 | COMMUNITY
Start: 2017-01-26 | End: 2017-08-17

## 2017-04-11 NOTE — PROGRESS NOTES
Arrhythmia Clinic Note (New patient)     DOS: 4/11/2017    Referring physician: Damaris Curtis    Chief complaint/Reason for consult: F/u ILR     HPI:  Patient is an 88 yo female with history of CVA who underwent placement of ILR with me to look for sub-clinical AF. Two minutes of AF was detected by the device on 3/31 in the late evening. She was asymptomatic from this.    ROS (+ highlighted in red):  Constitutional: Fevers/chills/fatigue/weightloss  HEENT: Blurry vision/eye pain/sore throat/hearing loss  Respiratory: Shortness of breath/cough  Cardiovascular: Chest pain/palpitations/edema/orthopnea/syncope  GI: Nausea/vomitting/diarrhea  MSK: Arthralgias/myagias/muscle weakness  Skin: Rash/sores  Neurological: Numbness/tremors/vertigo  Endocrine: Excessive thirst/polyuria/cold intolerance/heat intolerance  Psych: Depression/anxiety    Past Medical History   Diagnosis Date   • Arthritis    • Unspecified disorder of thyroid    • CATARACT    • Snoring      sleep study done   • Sleep apnea      stopped cpap due to sinus problems   • Bronchitis    • Bruxism    • COPD (chronic obstructive pulmonary disease) (CMS-[facility])    • Pneumonia    • Restless leg syndrome    • Chickenpox    • [place] measles    • Hyperlipidemia    • Fibromyalgia        Past Surgical History   Procedure Laterality Date   • Appendectomy     • Thyroidectomy     • Other       eyelids fixed drooped below pupil   • Tonsillectomy and adenoidectomy     • Femur orif  5/22/2013     Performed by Johan Tsai M.D. at SURGERY [facility]   • Cataract phaco with iol  12/11/2013     Performed by Oma Dockery M.D. at SURGERY SAME DAY [facility] ORS   • Pr remv 2nd cataract,corn-scler sectn     • Hip replacement, total         Social History     Social History   • Marital Status:      Spouse Name: N/A   • Number of Children: N/A   • Years of Education: N/A     Occupational History   • Not on file.     Social History Main Topics   • Smoking status: Former  "Smoker -- 1.00 packs/day for 20 years     Types: Cigarettes     Quit date: 05/21/1961   • Smokeless tobacco: Never Used   • Alcohol Use: Yes      Comment: 1 PER MO   • Drug Use: No   • Sexual Activity: Not Currently     Other Topics Concern   • Not on file     Social History Narrative       Family History   Problem Relation Age of Onset   • Cancer Sister    • Sleep Apnea Neg Hx    • Psychiatry Father      suicide       Allergies   Allergen Reactions   • Nkda [No Known Drug Allergy]    • Other Environmental      Smoke, perfume       Current Outpatient Prescriptions   Medication Sig Dispense Refill   • apixaban (ELIQUIS) 2.5mg Tab Take 1 Tab by mouth 2 Times a Day. 60 Tab 3   • Cholecalciferol (VITAMIN D3) 2000 UNITS Tab Take 4,000 Units by mouth every day.     • Cholecalciferol (VITAMIN D3) 55152 UNITS Cap Take  by mouth.     • BIOTIN PO Take  by mouth.     • levothyroxine (SYNTHROID) 75 MCG Tab Take 1 Tab by mouth Every morning on an empty stomach. (Patient taking differently: Take 100 mcg by mouth Every morning on an empty stomach.) 90 Tab 1   • cyanocobalamin (VITAMIN B-12) 1000 MCG/ML Solution INJECT 1 ML INTRAMUSCULARLY EVERY MONTH AS DIRECTED 1 Vial 11   • vitamin D, Ergocalciferol, (DRISDOL) 96398 UNITS Cap capsule Take 50,000 Units by mouth.  2   • erythromycin 5 MG/GM Ointment APPLY TO EYELIDS AT BEDTIME  0   • Coenzyme Q10 (CO Q 10 PO) Take  by mouth.     • Non Formulary Request Take 2 Tabs by mouth 2 Times a Day. \"Chaniflow Chinese drug for constipation\"       No current facility-administered medications for this visit.       Physical Exam:  Filed Vitals:    04/11/17 0936   BP: 116/70   Pulse: 72   Height: 1.626 m (5' 4.02\")   Weight: 71.305 kg (157 lb 3.2 oz)   SpO2: 94%     General appearance: NAD, conversant   Eyes: anicteric sclerae, moist conjunctivae; no lid-lag; PERRLA  HENT: Atraumatic; oropharynx clear with moist mucous membranes and no mucosal ulcerations; normal hard and soft palate  Neck: " Trachea midline; FROM, supple, no thyromegaly or lymphadenopathy  Lungs: CTA, with normal respiratory effort and no intercostal retractions  CV: RRR, no MRGs, no JVD   Abdomen: Soft, non-tender; no masses or HSM  Extremities: No peripheral edema or extremity lymphadenopathy  Skin: Normal temperature, turgor and texture; no rash, ulcers or subcutaneous nodules  Psych: Appropriate affect, alert and oriented to person, place and time    Data:  Labs reviewed:  Normal renal function    Prior echo reviewed    EKG interpreted by me:  NSR    Impression/Plan:  1. PAF (paroxysmal atrial fibrillation) (CMS-HCC)  EKG    apixaban (ELIQUIS) 2.5mg Tab   2. Cerebrovascular accident (CVA) due to embolism of cerebral artery (CMS-HCC)     3. Encounter for loop recorder check     4. Chronic anticoagulation       -She does have 2 minutes of AF on the loop recorder  -This was asymptomatic for her  -Given her history of CVA and AF found on the LINQ, I think it is reasonable to start anticoagulation for prevention of future stroke  -Will start Eliquis 2.5 BID  -Risks and benefits explained, will see her in 4 weeks to follow-up    Kaylee Valerio MD

## 2017-04-11 NOTE — MR AVS SNAPSHOT
"Afshan Love   2017 9:40 AM   Office Visit   MRN: [id number]    Department:  [facility]   Dept Phone:  [phone]    Description:  Female : 1927   Provider:  Kaylee Valerio M.D.           Reason for Visit     Follow-Up           Allergies as of 2017     Allergen Noted Reactions    Nkda [No Known Drug Allergy] 05/10/2008       Other Environmental 12/10/2013       Smoke, perfume      You were diagnosed with     PAF (paroxysmal atrial fibrillation) (CMS-HCC)   [729080]         Vital Signs     Blood Pressure Pulse Height Weight Body Mass Index Oxygen Saturation    116/70 mmHg 72 1.626 m (5' 4.02\") 71.305 kg (157 lb 3.2 oz) 26.97 kg/m2 94%    Smoking Status                   Former Smoker           Basic Information     Date Of Birth Sex Race Ethnicity Preferred Language    1927 Female White Non- English      Your appointments     May 18, 2017  9:40 AM   FOLLOW UP with Kaylee Valerio M.D.   [facility] (--)    [address], [address]  [facility] [id number] [phone]            2017 10:00 AM   Established Patient with [facility]    [address] [place] [address] [id number] [phone]           You will be receiving a confirmation call a few days before your appointment from our automated call confirmation system.              Problem List              ICD-10-CM Priority Class Noted - Resolved    Femur fracture, left (CMS-HCC) S72.92XA   2013 - Present    Hypothyroid E03.9   2013 - Present    HTN (hypertension) I10   2013 - Present    Fall W19.XXXA   2013 - Present    Cerebrovascular accident (CVA) due to embolism of cerebral artery (CMS-HCC) I63.40   3/30/2016 - Present    Stroke (CMS-HCC) I63.9   2016 - Present    Hyperlipidemia LDL goal <70 E78.5   2016 - Present    Palpitations R00.2   2016 - Present    Obstructive sleep apnea syndrome " G47.33   8/25/2016 - Present    Blunt trauma of sternum S29.8XXA   12/5/2016 - Present    Low serum vitamin D R79.89   12/5/2016 - Present    Hearing difficulty of both ears H91.93   3/6/2017 - Present    Need for prophylactic vaccination with Streptococcus pneumoniae (Pneumococcus) and Influenza vaccines Z23   3/6/2017 - Present    CVA (cerebral vascular accident) (CMS-[facility]) I63.9   3/23/2017 - Present      Health Maintenance        Date Due Completion Dates    IMM DTaP/Tdap/Td Vaccine (1 - Tdap) 7/25/1946 ---    PAP SMEAR 7/25/1948 ---    COLONOSCOPY 7/25/1977 ---    IMM ZOSTER VACCINE 7/25/1987 ---    MAMMOGRAM 1/7/2015 1/7/2014, 9/25/2008, 9/25/2008, 2/1/2006, 3/17/2004    BONE DENSITY 4/28/2016 4/28/2011, 10/20/2008            Results       Current Immunizations     13-VALENT PCV PREVNAR 3/6/2017    Influenza TIV (IM) 11/19/2012    Influenza Vaccine Adult HD 12/5/2016    Pneumococcal polysaccharide vaccine (PPSV-23) 11/21/2001      Below and/or attached are the medications your provider expects you to take. Review all of your home medications and newly ordered medications with your provider and/or pharmacist. Follow medication instructions as directed by your provider and/or pharmacist. Please keep your medication list with you and share with your provider. Update the information when medications are discontinued, doses are changed, or new medications (including over-the-counter products) are added; and carry medication information at all times in the event of emergency situations     Allergies:  NKDA - (reactions not documented)     OTHER ENVIRONMENTAL - (reactions not documented)               Medications  Valid as of: April 11, 2017 - 10:42 AM    Generic Name Brand Name Tablet Size Instructions for use    Apixaban (Tab) ELIQUIS 2.5mg Take 1 Tab by mouth 2 Times a Day.        Biotin   Take  by mouth.        Cholecalciferol (Cap) Vitamin D3 17620 UNITS Take  by mouth.        Cholecalciferol (Tab) Vitamin D3  "2000 UNITS Take 4,000 Units by mouth every day.        Coenzyme Q10   Take  by mouth.        Cyanocobalamin (Solution) VITAMIN B-12 1000 MCG/ML INJECT 1 ML INTRAMUSCULARLY EVERY MONTH AS DIRECTED        Ergocalciferol (Cap) DRISDOL 84373 UNITS Take 50,000 Units by mouth.        Erythromycin (Ointment) erythromycin 5 MG/GM APPLY TO EYELIDS AT BEDTIME        Levothyroxine Sodium (Tab) SYNTHROID 75 MCG Take 1 Tab by mouth Every morning on an empty stomach.        Non Formulary Request Non Formulary Request  Take 2 Tabs by mouth 2 Times a Day. \"Chaniflow Chinese drug for constipation\"        .                 Medicines prescribed today were sent to:     [id number] - [place], [place] - [address]    [address] [facility] [place] [address]    Phone: [phone] Fax: [phone]    Open 24 Hours?: No      Medication refill instructions:       If your prescription bottle indicates you have medication refills left, it is not necessary to call your provider’s office. Please contact your pharmacy and they will refill your medication.    If your prescription bottle indicates you do not have any refills left, you may request refills at any time through one of the following ways: The online [employer] system (except Urgent Care), by calling your provider’s office, or by asking your pharmacy to contact your provider’s office with a refill request. Medication refills are processed only during regular business hours and may not be available until the next business day. Your provider may request additional information or to have a follow-up visit with you prior to refilling your medication.   *Please Note: Medication refills are assigned a new Rx number when refilled electronically. Your pharmacy may indicate that no refills were authorized even though a new prescription for the same medication is available at the pharmacy. Please request the medicine by name with the pharmacy before contacting your provider for a refill.           " MyChart Status: Patient Declined

## 2017-04-13 LAB — EKG IMPRESSION: NORMAL

## 2017-05-18 ENCOUNTER — OFFICE VISIT (OUTPATIENT)
Dept: CARDIOLOGY | Facility: MEDICAL CENTER | Age: 82
End: 2017-05-18
Payer: MEDICARE

## 2017-05-18 VITALS
BODY MASS INDEX: 26.29 KG/M2 | DIASTOLIC BLOOD PRESSURE: 70 MMHG | SYSTOLIC BLOOD PRESSURE: 122 MMHG | HEART RATE: 78 BPM | WEIGHT: 154 LBS | OXYGEN SATURATION: 95 % | HEIGHT: 64 IN

## 2017-05-18 DIAGNOSIS — I63.10 CEREBROVASCULAR ACCIDENT (CVA) DUE TO EMBOLISM OF PRECEREBRAL ARTERY (HCC): ICD-10-CM

## 2017-05-18 DIAGNOSIS — I48.0 PAROXYSMAL ATRIAL FIBRILLATION (HCC): Primary | ICD-10-CM

## 2017-05-18 DIAGNOSIS — Z79.01 CHRONIC ANTICOAGULATION: ICD-10-CM

## 2017-05-18 DIAGNOSIS — Z45.09 ENCOUNTER FOR LOOP RECORDER CHECK: ICD-10-CM

## 2017-05-18 PROCEDURE — G8598 ASA/ANTIPLAT THER USED: HCPCS | Performed by: INTERNAL MEDICINE

## 2017-05-18 PROCEDURE — 99214 OFFICE O/P EST MOD 30 MIN: CPT | Mod: 25 | Performed by: INTERNAL MEDICINE

## 2017-05-18 PROCEDURE — 93291 INTERROG DEV EVAL SCRMS IP: CPT | Performed by: INTERNAL MEDICINE

## 2017-05-18 PROCEDURE — G8432 DEP SCR NOT DOC, RNG: HCPCS | Performed by: INTERNAL MEDICINE

## 2017-05-18 PROCEDURE — 1101F PT FALLS ASSESS-DOCD LE1/YR: CPT | Performed by: INTERNAL MEDICINE

## 2017-05-18 PROCEDURE — 4040F PNEUMOC VAC/ADMIN/RCVD: CPT | Performed by: INTERNAL MEDICINE

## 2017-05-18 PROCEDURE — 1036F TOBACCO NON-USER: CPT | Performed by: INTERNAL MEDICINE

## 2017-05-18 PROCEDURE — G8419 CALC BMI OUT NRM PARAM NOF/U: HCPCS | Performed by: INTERNAL MEDICINE

## 2017-05-18 RX ORDER — AMOXICILLIN 500 MG/1
CAPSULE ORAL
Refills: 1 | COMMUNITY
Start: 2017-04-25 | End: 2017-09-07

## 2017-05-18 NOTE — MR AVS SNAPSHOT
"        Afshan Love   2017 9:40 AM   Office Visit   MRN: [id number]    Department:  [facility]   Dept Phone:  [phone]    Description:  Female : 1927   Provider:  Kaylee Valerio M.D.           Reason for Visit     Follow-Up           Allergies as of 2017     Allergen Noted Reactions    Nkda [No Known Drug Allergy] 05/10/2008       Other Environmental 12/10/2013       Smoke, perfume      Vital Signs     Blood Pressure Pulse Height Weight Body Mass Index Oxygen Saturation    122/70 mmHg 78 1.626 m (5' 4.02\") 69.854 kg (154 lb) 26.42 kg/m2 95%    Smoking Status                   Former Smoker           Basic Information     Date Of Birth Sex Race Ethnicity Preferred Language    1927 Female White Non- English      Your appointments     2017 10:00 AM   Established Patient with Caitlin Rojas D.O.   [facility] ([id number])    [address] [place] [id number] [phone]           You will be receiving a confirmation call a few days before your appointment from our automated call confirmation system.              Problem List              ICD-10-CM Priority Class Noted - Resolved    Femur fracture, left (CMS-[facility]) S72.92XA   2013 - Present    Hypothyroid E03.9   2013 - Present    HTN (hypertension) I10   2013 - Present    Fall W19.XXXA   2013 - Present    Cerebrovascular accident (CVA) due to embolism of cerebral artery (CMS-HCC) I63.40   3/30/2016 - Present    Stroke (CMS-HCC) I63.9   2016 - Present    Hyperlipidemia LDL goal <70 E78.5   2016 - Present    Palpitations R00.2   2016 - Present    Obstructive sleep apnea syndrome G47.33   2016 - Present    Blunt trauma of sternum S29.8XXA   2016 - Present    Low serum vitamin D R79.89   2016 - Present    Hearing difficulty of both ears H91.93   3/6/2017 - Present    Need for prophylactic vaccination with Streptococcus pneumoniae (Pneumococcus) and " Influenza vaccines Z23   3/6/2017 - Present    CVA (cerebral vascular accident) (CMS-[facility]) I63.9   3/23/2017 - Present      Health Maintenance        Date Due Completion Dates    IMM DTaP/Tdap/Td Vaccine (1 - Tdap) 7/25/1946 ---    PAP SMEAR 7/25/1948 ---    COLONOSCOPY 7/25/1977 ---    IMM ZOSTER VACCINE 7/25/1987 ---    MAMMOGRAM 1/7/2015 1/7/2014, 9/25/2008, 9/25/2008, 2/1/2006, 3/17/2004    BONE DENSITY 4/28/2016 4/28/2011, 10/20/2008            Current Immunizations     13-VALENT PCV PREVNAR 3/6/2017    Influenza TIV (IM) 11/19/2012    Influenza Vaccine Adult HD 12/5/2016    Pneumococcal polysaccharide vaccine (PPSV-23) 11/21/2001      Below and/or attached are the medications your provider expects you to take. Review all of your home medications and newly ordered medications with your provider and/or pharmacist. Follow medication instructions as directed by your provider and/or pharmacist. Please keep your medication list with you and share with your provider. Update the information when medications are discontinued, doses are changed, or new medications (including over-the-counter products) are added; and carry medication information at all times in the event of emergency situations     Allergies:  NKDA - (reactions not documented)     OTHER ENVIRONMENTAL - (reactions not documented)               Medications  Valid as of: May 18, 2017 -  9:58 AM    Generic Name Brand Name Tablet Size Instructions for use    Amoxicillin (Cap) AMOXIL 500 MG TAKE 4 CAPSULES BY MOUTH 1 HOUR PRIOR TO DENTAL TREATMENT        Apixaban (Tab) ELIQUIS 2.5mg Take 1 Tab by mouth 2 Times a Day.        Biotin   Take  by mouth.        Cholecalciferol (Cap) Vitamin D3 42639 UNITS Take  by mouth.        Cholecalciferol (Tab) Vitamin D3 2000 UNITS Take 4,000 Units by mouth every day.        Coenzyme Q10   Take  by mouth.        Cyanocobalamin (Solution) VITAMIN B-12 1000 MCG/ML INJECT 1 ML INTRAMUSCULARLY EVERY MONTH AS DIRECTED         "Ergocalciferol (Cap) DRISDOL 95929 UNITS Take 50,000 Units by mouth.        Erythromycin (Ointment) erythromycin 5 MG/GM APPLY TO EYELIDS AT BEDTIME        Levothyroxine Sodium (Tab) SYNTHROID 75 MCG Take 1 Tab by mouth Every morning on an empty stomach.        Non Formulary Request Non Formulary Request  Take 2 Tabs by mouth 2 Times a Day. \"Chaniflow Chinese drug for constipation\"        .                 Medicines prescribed today were sent to:     [facility]/PHARMACY #[id number]  [place], [place] - [address]    [address] [facility] [address]    Phone: [phone] Fax: [phone]    Open 24 Hours?: No      Medication refill instructions:       If your prescription bottle indicates you have medication refills left, it is not necessary to call your provider’s office. Please contact your pharmacy and they will refill your medication.    If your prescription bottle indicates you do not have any refills left, you may request refills at any time through one of the following ways: The online [employer] system (except Urgent Care), by calling your provider’s office, or by asking your pharmacy to contact your provider’s office with a refill request. Medication refills are processed only during regular business hours and may not be available until the next business day. Your provider may request additional information or to have a follow-up visit with you prior to refilling your medication.   *Please Note: Medication refills are assigned a new Rx number when refilled electronically. Your pharmacy may indicate that no refills were authorized even though a new prescription for the same medication is available at the pharmacy. Please request the medicine by name with the pharmacy before contacting your provider for a refill.           [employer] Status: Patient Declined        "

## 2017-05-18 NOTE — PROGRESS NOTES
"Arrhythmia Clinic Note (Established patient)    DOS: 5/18/2017    Chief complaint/Reason for consult: F/u AF    Interval History:  Patient is an 89 yo F with cryptogenic stroke s/p ILR looking for subclinical AF, which we found, and started on Eliquis, here for follow-up. She has many questions regarding what is AF and her anticoagulation today.    ROS (+ highlighted in red):  Constitutional: Fevers/chills/fatigue/weightloss  Respiratory: Shortness of breath/cough  Cardiovascular: Chest pain/palpitations/edema/orthopnea/syncope    Past Medical History   Diagnosis Date   • Arthritis    • Unspecified disorder of thyroid    • CATARACT    • Snoring      sleep study done   • Sleep apnea      stopped cpap due to sinus problems   • Bronchitis    • Bruxism    • COPD (chronic obstructive pulmonary disease) (CMS-[facility])    • Pneumonia    • Restless leg syndrome    • Chickenpox    • Citizen of [place] measles    • Hyperlipidemia    • Fibromyalgia        Allergies   Allergen Reactions   • Nkda [No Known Drug Allergy]    • Other Environmental      Smoke, perfume       Current Outpatient Prescriptions   Medication Sig Dispense Refill   • erythromycin 5 MG/GM Ointment APPLY TO EYELIDS AT BEDTIME  0   • apixaban (ELIQUIS) 2.5mg Tab Take 1 Tab by mouth 2 Times a Day. 60 Tab 3   • Cholecalciferol (VITAMIN D3) 2000 UNITS Tab Take 4,000 Units by mouth every day.     • Coenzyme Q10 (CO Q 10 PO) Take  by mouth.     • Cholecalciferol (VITAMIN D3) 18395 UNITS Cap Take  by mouth.     • BIOTIN PO Take  by mouth.     • levothyroxine (SYNTHROID) 75 MCG Tab Take 1 Tab by mouth Every morning on an empty stomach. (Patient taking differently: Take 100 mcg by mouth Every morning on an empty stomach.) 90 Tab 1   • cyanocobalamin (VITAMIN B-12) 1000 MCG/ML Solution INJECT 1 ML INTRAMUSCULARLY EVERY MONTH AS DIRECTED 1 Vial 11   • Non Formulary Request Take 2 Tabs by mouth 2 Times a Day. \"Chaniflow Chinese drug for constipation\"     • amoxicillin (AMOXIL) 500 MG Cap " "TAKE 4 CAPSULES BY MOUTH 1 HOUR PRIOR TO DENTAL TREATMENT  1   • vitamin D, Ergocalciferol, (DRISDOL) 29398 UNITS Cap capsule Take 50,000 Units by mouth.  2     No current facility-administered medications for this visit.       Physical Exam:  Filed Vitals:    05/18/17 0930   BP: 122/70   Pulse: 78   Height: 1.626 m (5' 4.02\")   Weight: 69.854 kg (154 lb)   SpO2: 95%     General appearance: NAD, conversant   Eyes: anicteric sclerae, moist conjunctivae; no lid-lag; PERRLA  HENT: Atraumatic; oropharynx clear with moist mucous membranes and no mucosal ulcerations; normal hard and soft palate  Neck: Trachea midline; FROM, supple, no thyromegaly or lymphadenopathy  Lungs: CTA, with normal respiratory effort and no intercostal retractions  CV: RRR, no MRGs, no JVD  Abdomen: Soft, non-tender; no masses or HSM  Extremities: No peripheral edema or extremity lymphadenopathy  Skin: Normal temperature, turgor and texture; no rash, ulcers or subcutaneous nodules  Psych: Appropriate affect, alert and oriented to person, place and time    Data:  Labs reviewed    Echo reviewed    Loop recorder interrogation showing multiple AF episodes, longest ~10 minutes, burden <1%    Impression/Plan:  1. Paroxysmal atrial fibrillation (CMS-HCC)     2. Cerebrovascular accident (CVA) due to embolism of precerebral artery (CMS-HCC)     3. Chronic anticoagulation     4. Encounter for loop recorder check       -We discussed her AF burden, it is low and subclinical  -I do not think we need to address rhythm control  -She is okay keeping the device in for now  -We also discussed switching to warfarin for affordability, and at the end she prefers to stay with the Eliquis, I gave her more samples  -F/u in 6 mo    Kaylee Valerio MD    "

## 2017-06-06 ENCOUNTER — TELEPHONE (OUTPATIENT)
Dept: MEDICAL GROUP | Facility: PHYSICIAN GROUP | Age: 82
End: 2017-06-06

## 2017-06-06 ENCOUNTER — HOSPITAL ENCOUNTER (OUTPATIENT)
Dept: LAB | Facility: MEDICAL CENTER | Age: 82
End: 2017-06-06
Attending: INTERNAL MEDICINE
Payer: MEDICARE

## 2017-06-06 DIAGNOSIS — E03.9 HYPOTHYROIDISM, UNSPECIFIED TYPE: ICD-10-CM

## 2017-06-06 LAB
25(OH)D3 SERPL-MCNC: 38 NG/ML (ref 30–100)
ALBUMIN SERPL BCP-MCNC: 3.8 G/DL (ref 3.2–4.9)
ALBUMIN/GLOB SERPL: 1.2 G/DL
ALP SERPL-CCNC: 57 U/L (ref 30–99)
ALT SERPL-CCNC: 5 U/L (ref 2–50)
ANION GAP SERPL CALC-SCNC: 9 MMOL/L (ref 0–11.9)
AST SERPL-CCNC: 13 U/L (ref 12–45)
BASOPHILS # BLD AUTO: 0.5 % (ref 0–1.8)
BASOPHILS # BLD: 0.02 K/UL (ref 0–0.12)
BILIRUB SERPL-MCNC: 0.5 MG/DL (ref 0.1–1.5)
BUN SERPL-MCNC: 14 MG/DL (ref 8–22)
CALCIUM SERPL-MCNC: 8.9 MG/DL (ref 8.5–10.5)
CHLORIDE SERPL-SCNC: 105 MMOL/L (ref 96–112)
CHOLEST SERPL-MCNC: 177 MG/DL (ref 100–199)
CO2 SERPL-SCNC: 24 MMOL/L (ref 20–33)
CREAT SERPL-MCNC: 0.75 MG/DL (ref 0.5–1.4)
EOSINOPHIL # BLD AUTO: 0.1 K/UL (ref 0–0.51)
EOSINOPHIL NFR BLD: 2.4 % (ref 0–6.9)
ERYTHROCYTE [DISTWIDTH] IN BLOOD BY AUTOMATED COUNT: 46.5 FL (ref 35.9–50)
GFR SERPL CREATININE-BSD FRML MDRD: >60 ML/MIN/1.73 M 2
GLOBULIN SER CALC-MCNC: 3.2 G/DL (ref 1.9–3.5)
GLUCOSE SERPL-MCNC: 86 MG/DL (ref 65–99)
HCT VFR BLD AUTO: 37.6 % (ref 37–47)
HDLC SERPL-MCNC: 45 MG/DL
HGB BLD-MCNC: 11.9 G/DL (ref 12–16)
IMM GRANULOCYTES # BLD AUTO: 0.01 K/UL (ref 0–0.11)
IMM GRANULOCYTES NFR BLD AUTO: 0.2 % (ref 0–0.9)
LDLC SERPL CALC-MCNC: 109 MG/DL
LYMPHOCYTES # BLD AUTO: 1.53 K/UL (ref 1–4.8)
LYMPHOCYTES NFR BLD: 36 % (ref 22–41)
MCH RBC QN AUTO: 29.9 PG (ref 27–33)
MCHC RBC AUTO-ENTMCNC: 31.6 G/DL (ref 33.6–35)
MCV RBC AUTO: 94.5 FL (ref 81.4–97.8)
MONOCYTES # BLD AUTO: 0.48 K/UL (ref 0–0.85)
MONOCYTES NFR BLD AUTO: 11.3 % (ref 0–13.4)
NEUTROPHILS # BLD AUTO: 2.11 K/UL (ref 2–7.15)
NEUTROPHILS NFR BLD: 49.6 % (ref 44–72)
NRBC # BLD AUTO: 0 K/UL
NRBC BLD AUTO-RTO: 0 /100 WBC
PLATELET # BLD AUTO: 142 K/UL (ref 164–446)
PMV BLD AUTO: 10.9 FL (ref 9–12.9)
POTASSIUM SERPL-SCNC: 4.1 MMOL/L (ref 3.6–5.5)
PROT SERPL-MCNC: 7 G/DL (ref 6–8.2)
RBC # BLD AUTO: 3.98 M/UL (ref 4.2–5.4)
SODIUM SERPL-SCNC: 138 MMOL/L (ref 135–145)
TRIGL SERPL-MCNC: 117 MG/DL (ref 0–149)
TSH SERPL DL<=0.005 MIU/L-ACNC: 1.96 UIU/ML (ref 0.3–3.7)
WBC # BLD AUTO: 4.3 K/UL (ref 4.8–10.8)

## 2017-06-06 PROCEDURE — 82306 VITAMIN D 25 HYDROXY: CPT

## 2017-06-06 PROCEDURE — 84443 ASSAY THYROID STIM HORMONE: CPT

## 2017-06-06 PROCEDURE — 85025 COMPLETE CBC W/AUTO DIFF WBC: CPT

## 2017-06-06 PROCEDURE — 80061 LIPID PANEL: CPT

## 2017-06-06 PROCEDURE — 80053 COMPREHEN METABOLIC PANEL: CPT

## 2017-06-06 PROCEDURE — 36415 COLL VENOUS BLD VENIPUNCTURE: CPT

## 2017-06-06 NOTE — TELEPHONE ENCOUNTER
----- Message from Triston Summers M.D. sent at 6/6/2017  3:31 PM PDT -----  Covering for Dr. Rojas this week. There are no acutely concerning labs.     Please call patient and inform them that Dr. Rojas is out for maternity leave and that she should reschedule routine follow up. Thank you    - Dr. Summers

## 2017-06-15 ENCOUNTER — APPOINTMENT (OUTPATIENT)
Dept: MEDICAL GROUP | Facility: PHYSICIAN GROUP | Age: 82
End: 2017-06-15
Payer: MEDICARE

## 2017-06-22 ENCOUNTER — OFFICE VISIT (OUTPATIENT)
Dept: MEDICAL GROUP | Facility: PHYSICIAN GROUP | Age: 82
End: 2017-06-22
Payer: MEDICARE

## 2017-06-22 ENCOUNTER — HOSPITAL ENCOUNTER (OUTPATIENT)
Dept: LAB | Facility: MEDICAL CENTER | Age: 82
End: 2017-06-22
Attending: FAMILY MEDICINE
Payer: MEDICARE

## 2017-06-22 VITALS
WEIGHT: 151 LBS | TEMPERATURE: 97.2 F | BODY MASS INDEX: 25.78 KG/M2 | SYSTOLIC BLOOD PRESSURE: 128 MMHG | HEART RATE: 74 BPM | OXYGEN SATURATION: 96 % | DIASTOLIC BLOOD PRESSURE: 62 MMHG | HEIGHT: 64 IN

## 2017-06-22 DIAGNOSIS — D61.818 PANCYTOPENIA (HCC): ICD-10-CM

## 2017-06-22 DIAGNOSIS — E03.9 HYPOTHYROIDISM, UNSPECIFIED TYPE: ICD-10-CM

## 2017-06-22 DIAGNOSIS — D64.9 ANEMIA, UNSPECIFIED TYPE: ICD-10-CM

## 2017-06-22 LAB
FERRITIN SERPL-MCNC: 199.2 NG/ML (ref 10–291)
IRON SATN MFR SERPL: 24 % (ref 15–55)
IRON SERPL-MCNC: 64 UG/DL (ref 40–170)
TIBC SERPL-MCNC: 272 UG/DL (ref 250–450)
VIT B12 SERPL-MCNC: 229 PG/ML (ref 211–911)

## 2017-06-22 PROCEDURE — 83540 ASSAY OF IRON: CPT

## 2017-06-22 PROCEDURE — 36415 COLL VENOUS BLD VENIPUNCTURE: CPT

## 2017-06-22 PROCEDURE — 83550 IRON BINDING TEST: CPT

## 2017-06-22 PROCEDURE — 82607 VITAMIN B-12: CPT

## 2017-06-22 PROCEDURE — 99214 OFFICE O/P EST MOD 30 MIN: CPT | Performed by: FAMILY MEDICINE

## 2017-06-22 PROCEDURE — 82728 ASSAY OF FERRITIN: CPT

## 2017-06-22 RX ORDER — LEVOTHYROXINE SODIUM 0.07 MG/1
75 TABLET ORAL EVERY MORNING
Qty: 90 TAB | Refills: 1 | Status: SHIPPED | OUTPATIENT
Start: 2017-06-22 | End: 2017-08-17

## 2017-06-22 NOTE — MR AVS SNAPSHOT
"        Afshan Love   2017 11:20 AM   Office Visit   MRN: [id number]    Department:  [facility]   Dept Phone:  [phone]    Description:  Female : 1927   Provider:  Marta Doshi M.D.           Reason for Visit     Follow-Up           Allergies as of 2017     Allergen Noted Reactions    Nkda [No Known Drug Allergy] 05/10/2008       Other Environmental 12/10/2013       Smoke, perfume      You were diagnosed with     Hypothyroidism, unspecified type   [5661735]       Pancytopenia (CMS-[facility])   [0246484]       Anemia, unspecified type   [5162961]         Vital Signs     Blood Pressure Pulse Temperature Height Weight Body Mass Index    128/62 mmHg 74 36.2 °C (97.2 °F) 1.626 m (5' 4\") 68.493 kg (151 lb) 25.91 kg/m2    Oxygen Saturation Smoking Status                96% Former Smoker          Basic Information     Date Of Birth Sex Race Ethnicity Preferred Language    1927 Female White Non- English      Your appointments     2017  9:40 AM   FOLLOW UP with Kaylee Valerio M.D.   [facility] (--)    [address], [facility] [address]  [facility] [address] [phone]              Problem List              ICD-10-CM Priority Class Noted - Resolved    Femur fracture, left (CMS-[facility]) S72.92XA   2013 - Present    Hypothyroid E03.9   2013 - Present    HTN (hypertension) I10   2013 - Present    Fall W19.XXXA   2013 - Present    Cerebrovascular accident (CVA) due to embolism of cerebral artery (CMS-HCC) I63.40   3/30/2016 - Present    Stroke (CMS-HCC) I63.9   2016 - Present    Hyperlipidemia LDL goal <70 E78.5   2016 - Present    Palpitations R00.2   2016 - Present    Obstructive sleep apnea syndrome G47.33   2016 - Present    Blunt trauma of sternum S29.8XXA   2016 - Present    Low serum vitamin D R79.89   2016 - Present    Hearing difficulty of both ears H91.93   3/6/2017 - Present    Need for " prophylactic vaccination with Streptococcus pneumoniae (Pneumococcus) and Influenza vaccines Z23   3/6/2017 - Present    CVA (cerebral vascular accident) (CMS-HCC) I63.9   3/23/2017 - Present    Pancytopenia (CMS-HCC) D61.818   6/22/2017 - Present    Anemia D64.9   6/22/2017 - Present      Health Maintenance        Date Due Completion Dates    IMM DTaP/Tdap/Td Vaccine (1 - Tdap) 7/25/1946 ---    PAP SMEAR 7/25/1948 ---    COLONOSCOPY 7/25/1977 ---    IMM ZOSTER VACCINE 7/25/1987 ---    MAMMOGRAM 1/7/2015 1/7/2014, 9/25/2008, 9/25/2008, 2/1/2006, 3/17/2004    BONE DENSITY 4/28/2016 4/28/2011, 10/20/2008            Current Immunizations     13-VALENT PCV PREVNAR 3/6/2017    Influenza TIV (IM) 11/19/2012    Influenza Vaccine Adult HD 12/5/2016    Pneumococcal polysaccharide vaccine (PPSV-23) 11/21/2001      Below and/or attached are the medications your provider expects you to take. Review all of your home medications and newly ordered medications with your provider and/or pharmacist. Follow medication instructions as directed by your provider and/or pharmacist. Please keep your medication list with you and share with your provider. Update the information when medications are discontinued, doses are changed, or new medications (including over-the-counter products) are added; and carry medication information at all times in the event of emergency situations     Allergies:  NKDA - (reactions not documented)     OTHER ENVIRONMENTAL - (reactions not documented)               Medications  Valid as of: June 22, 2017 - 11:48 AM    Generic Name Brand Name Tablet Size Instructions for use    Amoxicillin (Cap) AMOXIL 500 MG TAKE 4 CAPSULES BY MOUTH 1 HOUR PRIOR TO DENTAL TREATMENT        Apixaban (Tab) ELIQUIS 2.5mg Take 1 Tab by mouth 2 Times a Day.        Biotin   Take  by mouth.        Cholecalciferol (Cap) Vitamin D3 15286 UNITS Take  by mouth.        Cholecalciferol (Tab) Vitamin D3 2000 UNITS Take 4,000 Units by mouth  "every day.        Coenzyme Q10   Take  by mouth.        Cyanocobalamin (Solution) VITAMIN B-12 1000 MCG/ML INJECT 1 ML INTRAMUSCULARLY EVERY MONTH AS DIRECTED        Ergocalciferol (Cap) DRISDOL 92851 UNITS Take 50,000 Units by mouth.        Erythromycin (Ointment) erythromycin 5 MG/GM APPLY TO EYELIDS AT BEDTIME        Levothyroxine Sodium (Tab) SYNTHROID 75 MCG Take 1 Tab by mouth every morning. ON AN EMPTY STOMACH.        Non Formulary Request Non Formulary Request  Take 2 Tabs by mouth 2 Times a Day. \"Chaniflow Chinese drug for constipation\"        .                 Medicines prescribed today were sent to:     [facility]/PHARMACY #[id number] - [place], [place] - [address] [facility]    [address] [facility] [place] [address]    Phone: [phone] Fax: [phone]    Open 24 Hours?: No      Medication refill instructions:       If your prescription bottle indicates you have medication refills left, it is not necessary to call your provider’s office. Please contact your pharmacy and they will refill your medication.    If your prescription bottle indicates you do not have any refills left, you may request refills at any time through one of the following ways: The online [employer] system (except Urgent Care), by calling your provider’s office, or by asking your pharmacy to contact your provider’s office with a refill request. Medication refills are processed only during regular business hours and may not be available until the next business day. Your provider may request additional information or to have a follow-up visit with you prior to refilling your medication.   *Please Note: Medication refills are assigned a new Rx number when refilled electronically. Your pharmacy may indicate that no refills were authorized even though a new prescription for the same medication is available at the pharmacy. Please request the medicine by name with the pharmacy before contacting your provider for a refill.        Your To Do List     Future Labs/Procedures " Complete By Expires    FERRITIN  As directed 6/22/2018    IRON/TOTAL IRON BIND  As directed 6/23/2018    VITAMIN B12  As directed 6/23/2018         MyChart Status: Patient Declined

## 2017-06-22 NOTE — ASSESSMENT & PLAN NOTE
Patient has been on Synthroid 75 µg daily. One week back she ran out of the Synthroid 75 µg and she had some Synthroid 100 µg  Left from a previous prescription which she has been taking for the last one week.  On December 2016, based on her thyroid labs Synthroid was decreased to 75 µg. She recently had thyroid labs done and they are normal.    Results for TIERNEY VASQUEZ (MRN [id number]) as of 6/22/2017 11:50   Ref. Range 6/6/2017 08:22   TSH Latest Ref Range: 0.300-3.700 uIU/mL 1.960

## 2017-06-22 NOTE — PROGRESS NOTES
Subjective:   Afshan Love is a 89 y.o. female here today for reviewing recent lab results, follow-up on hypothyroidism    Hypothyroid  Patient has been on Synthroid 75 µg daily. One week back she ran out of the Synthroid 75 µg and she had some Synthroid 100 µg  Left from a previous prescription which she has been taking for the last one week.  On December 2016, based on her thyroid labs Synthroid was decreased to 75 µg. She recently had thyroid labs done and they are normal.    Results for AFSHAN LOVE (MRN [id number]) as of 6/22/2017 11:50   Ref. Range 6/6/2017 08:22   TSH Latest Ref Range: 0.300-3.700 uIU/mL 1.960       Pancytopenia (CMS-HCC)  Based on review of patients lab result review, she has had a decline in her WBC count , hemoglobin and RBC count and platelet count November 2016.She has been on Eliiquis for Paroxysmal Atrial fibrillation.this is managed by Cardiologist, .She reports feeling tired and fatigue since she had ILR placement.She denies any visible bleeding tendencies or bruises.    Results for AFSHAN LOVE (MRN [id number]) as of 6/22/2017 11:50   Ref. Range 11/22/2016 07:41 1/3/2017 09:58 4/11/2017 09:51 6/6/2017 08:22   WBC Latest Ref Range: 4.8-10.8 K/uL 4.5 (L)   4.3 (L)   RBC Latest Ref Range: 4.20-5.40 M/uL 4.06 (L)   3.98 (L)   Hemoglobin Latest Ref Range: 12.0-16.0 g/dL 12.2   11.9 (L)   Hematocrit Latest Ref Range: 37.0-47.0 % 39.1   37.6   MCV Latest Ref Range: 81.4-97.8 fL 96.3   94.5   MCH Latest Ref Range: 27.0-33.0 pg 30.0   29.9   MCHC Latest Ref Range: 33.6-35.0 g/dL 31.2 (L)   31.6 (L)   RDW Latest Ref Range: 35.9-50.0 fL 47.9   46.5   Platelet Count Latest Ref Range: 164-446 K/uL 157 (L)   142 (L)                Current medicines (including changes today)  Current Outpatient Prescriptions   Medication Sig Dispense Refill   • levothyroxine (SYNTHROID) 75 MCG Tab Take 1 Tab by mouth every morning. ON AN EMPTY STOMACH. 90 Tab 1   • erythromycin 5 MG/GM  "Ointment APPLY TO EYELIDS AT BEDTIME  0   • apixaban (ELIQUIS) 2.5mg Tab Take 1 Tab by mouth 2 Times a Day. 60 Tab 3   • Cholecalciferol (VITAMIN D3) 2000 UNITS Tab Take 4,000 Units by mouth every day.     • Coenzyme Q10 (CO Q 10 PO) Take  by mouth.     • Cholecalciferol (VITAMIN D3) 37434 UNITS Cap Take  by mouth.     • BIOTIN PO Take  by mouth.     • cyanocobalamin (VITAMIN B-12) 1000 MCG/ML Solution INJECT 1 ML INTRAMUSCULARLY EVERY MONTH AS DIRECTED 1 Vial 11   • vitamin D, Ergocalciferol, (DRISDOL) 83259 UNITS Cap capsule Take 50,000 Units by mouth.  2   • Non Formulary Request Take 2 Tabs by mouth 2 Times a Day. \"Chaniflow Chinese drug for constipation\"     • amoxicillin (AMOXIL) 500 MG Cap TAKE 4 CAPSULES BY MOUTH 1 HOUR PRIOR TO DENTAL TREATMENT  1     No current facility-administered medications for this visit.     She  has a past medical history of Arthritis; Unspecified disorder of thyroid; CATARACT; Snoring; Sleep apnea; Bronchitis; Bruxism; COPD (chronic obstructive pulmonary disease) (CMS-[facility]); Pneumonia; Restless leg syndrome; Chickenpox; [place] measles; Hyperlipidemia; and Fibromyalgia.    ROS   No chest pain, no shortness of breath, no abdominal pain  No nausea, vomiting  No weakness, no headache     Objective:     Blood pressure 128/62, pulse 74, temperature 36.2 °C (97.2 °F), height 1.626 m (5' 4\"), weight 68.493 kg (151 lb), SpO2 96 %. Body mass index is 25.91 kg/(m^2).     PHYSICAL EXAM     GEN: Alert and oriented,well appearing, no acute distress  SKIN: warm, dry to touch, no rashes or lesions in visible areas  PSYCH: mood and affect normal, judgement normal  EYES: Conjunctiva clear, lids normal,pupils equal round and reactive  ENMT: Normal external nose and ears,EACs normal appearing, TM pearly gray with normal light reflex bilaterally,nasal mucosa and turbinates normal appearing without erythema or edema, lips without lesions,good dentition,oropharynx clear  Neck : Trachea midline, no masses " or swelling, no thyromegaly  LYMPHATIC : No cervical or supraclavicular lymphadenopathy  RESPIRATORY : Unlabored respiratory effort, no distress noted, clear to auscultation bilaterally, no wheeze, rhonchi, crackles  CARDIOVASCULAR: RRR, S1 S2 normal, no edema of the extremities  MUSCULOSKELETAL : Normal gait and stance, no obvious abnormalities   NEURO: No overt focal neurologic deficits,sensation intact        Assessment and Plan:   The following treatment plan was discussed    1. Hypothyroidism, unspecified type  This is a chronic medical condition.Controlled.Continue current medications.  - levothyroxine (SYNTHROID) 75 MCG Tab; Take 1 Tab by mouth every morning. ON AN EMPTY STOMACH.  Dispense: 90 Tab; Refill: 1    2. Pancytopenia (CMS-HCC)  New problem.Will check B12 level and iron studies  ? Eliquis adverse effect.Will repeat CBC in 1 month to monitor blodd counts  - VITAMIN B12; Future  - FERRITIN; Future  - IRON/TOTAL IRON BIND; Future    3. Anemia, unspecified type  New problem.Orderd iron studies  - FERRITIN; Future  - IRON/TOTAL IRON BIND; Future      Followup: Return in about 4 weeks (around 7/20/2017) for follow up on fatigu and pancytopenia.         Please note that this dictation was created using voice recognition software. I have made every reasonable attempt to correct obvious errors, but I expect that there are errors of grammar and possibly content that I did not discover before finalizing the note.

## 2017-06-22 NOTE — ASSESSMENT & PLAN NOTE
Based on review of patients lab result review, she has had a decline in her WBC count , hemoglobin and RBC count and platelet count November 2016.She has been on Eliiquis for Paroxysmal Atrial fibrillation.this is managed by Cardiologist, .She reports feeling tired and fatigue since she had ILR placement.She denies any visible bleeding tendencies or bruises.    Results for TIERNEY VASQUEZ (MRN [id number]) as of 6/22/2017 11:50   Ref. Range 11/22/2016 07:41 1/3/2017 09:58 4/11/2017 09:51 6/6/2017 08:22   WBC Latest Ref Range: 4.8-10.8 K/uL 4.5 (L)   4.3 (L)   RBC Latest Ref Range: 4.20-5.40 M/uL 4.06 (L)   3.98 (L)   Hemoglobin Latest Ref Range: 12.0-16.0 g/dL 12.2   11.9 (L)   Hematocrit Latest Ref Range: 37.0-47.0 % 39.1   37.6   MCV Latest Ref Range: 81.4-97.8 fL 96.3   94.5   MCH Latest Ref Range: 27.0-33.0 pg 30.0   29.9   MCHC Latest Ref Range: 33.6-35.0 g/dL 31.2 (L)   31.6 (L)   RDW Latest Ref Range: 35.9-50.0 fL 47.9   46.5   Platelet Count Latest Ref Range: 164-446 K/uL 157 (L)   142 (L)

## 2017-06-23 ENCOUNTER — TELEPHONE (OUTPATIENT)
Dept: MEDICAL GROUP | Facility: PHYSICIAN GROUP | Age: 82
End: 2017-06-23

## 2017-06-23 NOTE — TELEPHONE ENCOUNTER
----- Message from Marta Doshi M.D. sent at 6/22/2017 11:03 PM PDT -----  Please inform patient that her vitamin B12 level is normal.Also the iron studies are normal.We will need to monitor her blood counts.Will repeat CBC in 1 month.Order will be placed in chart.    Marta Doshi M.D.

## 2017-06-29 ENCOUNTER — PATIENT OUTREACH (OUTPATIENT)
Dept: HEALTH INFORMATION MANAGEMENT | Facility: OTHER | Age: 82
End: 2017-06-29

## 2017-07-19 ENCOUNTER — TELEPHONE (OUTPATIENT)
Dept: HEALTH INFORMATION MANAGEMENT | Facility: OTHER | Age: 82
End: 2017-07-19

## 2017-07-19 DIAGNOSIS — Z12.11 SCREENING FOR COLON CANCER: ICD-10-CM

## 2017-07-19 NOTE — TELEPHONE ENCOUNTER
Patient is over the recommended age and is showing overdue for pap smear and mammogram and I did speak to the and she stated stated she no longer has this done due to her age in Health Maintenance.    Please reply to this message as to whether these tests are appropriate and I will update the Health Maintenance Topic or contact the patient to schedule.

## 2017-07-19 NOTE — PROGRESS NOTES
Attempt #:3    WebIZ Checked & Epic Updated: yes  HealthConnect Verified: yes  Verify PCP: yes    Communication Preference Obtained: yes     Review Care Team: yes    Annual Wellness Visit Scheduling  1. Scheduling Status:Scheduled       Care Gap Scheduling (Attempt to Schedule EACH Overdue Care Gap!)     Health Maintenance Due   Topic Date Due   • Annual Wellness Visit  Schedule    • IMM DTaP/Tdap/Td Vaccine (1 - Tdap) Pt wants to discuss with pcp    • PAP SMEAR  Declined    • COLONOSCOPY  Sent fit test    • IMM ZOSTER VACCINE  Pt wants to discuss with pcp    • MAMMOGRAM  Declined    • BONE DENSITY  Pt wants to discuss with pcp          MyChart Activation: sent activation code  PHARMAJET Beatriz: no  Virtual Visits: no  Opt In to Text Messages: no

## 2017-07-21 ENCOUNTER — TELEPHONE (OUTPATIENT)
Dept: CARDIOLOGY | Facility: MEDICAL CENTER | Age: 82
End: 2017-07-21

## 2017-07-21 NOTE — TELEPHONE ENCOUNTER
----- Message from Kaylee Valerio M.D. sent at 7/21/2017  9:34 AM PDT -----  Regarding: RE: SHEILA Manzanares ulisses, f/u as scheduled.    ----- Message -----     From: Oma Meadows, Med Ass't     Sent: 7/20/2017  11:01 AM       To: Kaylee Valerio M.D.  Subject: SHEILA                                              Patient's loop recorder transmitted via home monitor-- she had AF with some RVR in the 180 range on 6/18 lasting 6 hours,    Ulisses Ernandez

## 2017-08-15 ENCOUNTER — TELEPHONE (OUTPATIENT)
Dept: CARDIOLOGY | Facility: MEDICAL CENTER | Age: 82
End: 2017-08-15

## 2017-08-15 NOTE — TELEPHONE ENCOUNTER
----- Message from Anna Walls sent at 8/15/2017 11:14 AM PDT -----  Regarding: Problem with dizziness  SS/Silvia    Patient is having problems with dizziness and is taking Eliquis. She wants to find out about cutting the medication in half and wants a call back at [phone].

## 2017-08-15 NOTE — TELEPHONE ENCOUNTER
To Rin guillaume FYI.          Message  Received: Today       Sabrina Boston, L.P.NTushar       Caller: Unspecified (Today, 11:30 AM)                     Reviewed loop recorder, confirms PAF on 8- ~6pm, >13 hours.

## 2017-08-15 NOTE — TELEPHONE ENCOUNTER
"Spoke with pt. She had a glass of wine on Sat. Night (she hasn't had any alcohol in a long time). She awoke Sun. Am with dizziness, loss of balance. She has not checked her BP's. She takes Eliquis 2.5mg BID. Pt. Still feels \"woozy\" today.   Scheduled pt. to see Rin on Thursday. To Oma/Sabrina to review loop recording.  "

## 2017-08-17 ENCOUNTER — OFFICE VISIT (OUTPATIENT)
Dept: CARDIOLOGY | Facility: MEDICAL CENTER | Age: 82
End: 2017-08-17
Payer: MEDICARE

## 2017-08-17 VITALS
DIASTOLIC BLOOD PRESSURE: 64 MMHG | BODY MASS INDEX: 25.27 KG/M2 | OXYGEN SATURATION: 96 % | WEIGHT: 148 LBS | HEART RATE: 74 BPM | SYSTOLIC BLOOD PRESSURE: 132 MMHG | HEIGHT: 64 IN

## 2017-08-17 DIAGNOSIS — I48.0 PAF (PAROXYSMAL ATRIAL FIBRILLATION) (HCC): ICD-10-CM

## 2017-08-17 PROCEDURE — 99214 OFFICE O/P EST MOD 30 MIN: CPT | Performed by: NURSE PRACTITIONER

## 2017-08-17 RX ORDER — LEVOTHYROXINE SODIUM 0.1 MG/1
100 TABLET ORAL
COMMUNITY
End: 2018-04-18

## 2017-08-17 ASSESSMENT — ENCOUNTER SYMPTOMS
ABDOMINAL PAIN: 0
WEAKNESS: 1
ORTHOPNEA: 0
PND: 0
COUGH: 0
SHORTNESS OF BREATH: 0
FEVER: 0
DIZZINESS: 1
MYALGIAS: 0
CLAUDICATION: 0
PALPITATIONS: 0

## 2017-08-17 NOTE — PROGRESS NOTES
"Subjective:   Afshan Love is a 89 y.o. female who presents today with complaints of dizziness with her son, Megan.    Patient of Dr. Valerio. Patient was last seen 5/18/17. Pt reports on Saturday night, at approx 6 pm, pt went out to dinner with a friend and had a glass of wine. She reports she has not had wine in a long time. Pt states she woke up on Sunday feeling dizzy and feeling unsteady. Per her ILR interrogation, pt had an episode of Afib for over 13 hours starting around 6 PM. Since Saturday, her dizziness is better but continues to feel \"woozie\" and has some unsteadiness. Pt is using a cane and also her hearing aides are not working. She is unsure if this is affecting her.    Pt has not taken any home BPs. She is concerned if her Eliquis is causing her \"woozie\" feelings.    Patient denies chest pain, shortness of breath, palpitations, orthopnea, PND, edema, syncope or falls.     Additonally, patient has the following medical problems:    -Hx CVA    -Hypothyroidism: Taking levthyroxine    Past Medical History   Diagnosis Date   • Arthritis    • Unspecified disorder of thyroid    • CATARACT    • Snoring      sleep study done   • Sleep apnea      stopped cpap due to sinus problems   • Bronchitis    • Bruxism    • COPD (chronic obstructive pulmonary disease) (CMS-[facility])    • Pneumonia    • Restless leg syndrome    • Chickenpox    • Upper sorbian measles    • Hyperlipidemia    • Fibromyalgia    • Pericarditis 1960s     Past Surgical History   Procedure Laterality Date   • Appendectomy     • Thyroidectomy     • Other       eyelids fixed drooped below pupil   • Tonsillectomy and adenoidectomy     • Femur orif  5/22/2013     Performed by Johan Tsai M.D. at SURGERY [facility]   • Cataract phaco with iol  12/11/2013     Performed by Oma Dockery M.D. at SURGERY SAME DAY [facility] ORS   • Pr remv 2nd cataract,corn-scler sectn     • Hip replacement, total       Family History   Problem Relation Age of Onset   • " "Cancer Sister    • Sleep Apnea Neg Hx    • Psychiatry Father      suicide   • Heart Disease Son      extra heart beat     History   Smoking status   • Former Smoker -- 1.00 packs/day for 20 years   • Types: Cigarettes   • Quit date: 05/21/1961   Smokeless tobacco   • Never Used     Allergies   Allergen Reactions   • Nkda [No Known Drug Allergy]    • Other Environmental      Smoke, perfume     Outpatient Encounter Prescriptions as of 8/17/2017   Medication Sig Dispense Refill   • levothyroxine (SYNTHROID) 100 MCG Tab Take 100 mcg by mouth Every morning on an empty stomach.     • apixaban (ELIQUIS) 2.5mg Tab Take 1 Tab by mouth 2 Times a Day. 60 Tab 3   • Cholecalciferol (VITAMIN D3) 2000 UNITS Tab Take 4,000 Units by mouth every day.     • BIOTIN PO Take  by mouth.     • cyanocobalamin (VITAMIN B-12) 1000 MCG/ML Solution INJECT 1 ML INTRAMUSCULARLY EVERY MONTH AS DIRECTED 1 Vial 11   • [DISCONTINUED] levothyroxine (SYNTHROID) 75 MCG Tab Take 1 Tab by mouth every morning. ON AN EMPTY STOMACH. 90 Tab 1   • amoxicillin (AMOXIL) 500 MG Cap TAKE 4 CAPSULES BY MOUTH 1 HOUR PRIOR TO DENTAL TREATMENT  1   • [DISCONTINUED] erythromycin 5 MG/GM Ointment APPLY TO EYELIDS AT BEDTIME  0   • Coenzyme Q10 (CO Q 10 PO) Take  by mouth.     • [DISCONTINUED] Cholecalciferol (VITAMIN D3) 57325 UNITS Cap Take  by mouth.     • [DISCONTINUED] vitamin D, Ergocalciferol, (DRISDOL) 30162 UNITS Cap capsule Take 50,000 Units by mouth.  2   • Non Formulary Request Take 2 Tabs by mouth 2 Times a Day. \"Chaniflow Chinese drug for constipation\"       No facility-administered encounter medications on file as of 8/17/2017.     Review of Systems   Constitutional: Positive for malaise/fatigue. Negative for fever.        Unsteady   Respiratory: Negative for cough and shortness of breath.    Cardiovascular: Negative for chest pain, palpitations, orthopnea, claudication, leg swelling and PND.   Gastrointestinal: Negative for abdominal pain. " "  Musculoskeletal: Negative for myalgias.   Neurological: Positive for dizziness (Lightheaded) and weakness.   All other systems reviewed and are negative.       Objective:   /64 mmHg  Pulse 74  Ht 1.626 m (5' 4.02\")  Wt 67.132 kg (148 lb)  BMI 25.39 kg/m2  SpO2 96%    Physical Exam   Constitutional: She is oriented to person, place, and time. She appears well-developed and well-nourished.   Uses a cane for Ambulation   HENT:   Head: Normocephalic and atraumatic.   Eyes: EOM are normal.   Neck: Normal range of motion. Neck supple. No JVD present.   Cardiovascular: Normal rate, regular rhythm, normal heart sounds and intact distal pulses.    No murmur heard.  Pulmonary/Chest: Effort normal and breath sounds normal. No respiratory distress. She has no wheezes. She has no rales.   Abdominal: Soft. Bowel sounds are normal.   Musculoskeletal: Normal range of motion. She exhibits no edema.   Neurological: She is alert and oriented to person, place, and time.   Neuro function grossly intact   Skin: Skin is warm and dry.   Psychiatric: She has a normal mood and affect.   Nursing note and vitals reviewed.    Lab Results   Component Value Date/Time    CHOLESTEROL, 06/06/2017 08:22 AM    * 06/06/2017 08:22 AM    HDL 45 06/06/2017 08:22 AM    TRIGLYCERIDES 117 06/06/2017 08:22 AM       Lab Results   Component Value Date/Time    SODIUM 138 06/06/2017 08:22 AM    POTASSIUM 4.1 06/06/2017 08:22 AM    CHLORIDE 105 06/06/2017 08:22 AM    CO2 24 06/06/2017 08:22 AM    GLUCOSE 86 06/06/2017 08:22 AM    BUN 14 06/06/2017 08:22 AM    CREATININE 0.75 06/06/2017 08:22 AM     Lab Results   Component Value Date/Time    ALKALINE PHOSPHATASE 57 06/06/2017 08:22 AM    AST(SGOT) 13 06/06/2017 08:22 AM    ALT(SGPT) 5 06/06/2017 08:22 AM    TOTAL BILIRUBIN 0.5 06/06/2017 08:22 AM      Transthoracic Echo Report 3/31/16  Normal left ventricular systolic function.  Left ventricular ejection fraction is visually estimated to " "be 60%.  Mild concentric left ventricular hypertrophy.  Aortic sclerosis without stenosis.  Mild aortic insufficiency.  Mild mitral annular calcification.  Thickened mitral valve leaflets with normal leaflet excursion with mild   prolapse of the posterior mitral valve leaflet.  Trace mitral regurgitation.  Mildly dilated left atrium.  Right heart pressures are normal.      Assessment:     1. PAF (paroxysmal atrial fibrillation) (CMS-[facility])  apixaban (ELIQUIS) 2.5mg Tab     Medical Decision Making:  Today's Assessment / Status / Plan:   1. PAF: Her episode of PAF appears to be induce by her wine use. Pt encouraged to avoid alcohol.  -Continue Eliquis 2.5 mg BID ( [facility])  -Encouraged pt to continue to stay active and use fall precautions. Pt may benefit from some physical therapy. Pt to see her PCP  -Encouraged pt to use hearing aids.  -If her \"wozzie\" feeling does not improve pt to follow up in clinic.     FU in clinic in 3 months with Dr. Valerio. Sooner if needed.    Patient verbalizes understanding and agrees with the plan of care.     Collaborating MD: Lane Feldman MD        "

## 2017-08-17 NOTE — MR AVS SNAPSHOT
"Afshan Love   2017 8:40 AM   Office Visit   MRN: [id number]    Department:  [facility]   Dept Phone:  [phone]    Description:  Female : 1927   Provider:  LEONARDO Mcadams           Reason for Visit     Follow-Up PP of SS      Allergies as of 2017     Allergen Noted Reactions    Nkda [No Known Drug Allergy] 05/10/2008       Other Environmental 12/10/2013       Smoke, perfume      You were diagnosed with     PAF (paroxysmal atrial fibrillation) (CMS-HCC)   [453281]         Vital Signs     Blood Pressure Pulse Height Weight Body Mass Index Oxygen Saturation    132/64 mmHg 74 1.626 m (5' 4.02\") 67.132 kg (148 lb) 25.39 kg/m2 96%    Smoking Status                   Former Smoker           Basic Information     Date Of Birth Sex Race Ethnicity Preferred Language    1927 Female White Non- English      Your appointments     Sep 07, 2017  8:20 AM   ANNUAL WELLNESS with Caitlin Rojas D.O., [facility]    [facility]    [address] [place] [id number] [phone]            2017  9:40 AM   FOLLOW UP with Kaylee Valerio M.D.   [facility] (--)    [address], [facility] [address]  [facility] [id number] [phone]              Problem List              ICD-10-CM Priority Class Noted - Resolved    Femur fracture, left (CMS-HCC) S72.92XA   2013 - Present    Hypothyroid E03.9   2013 - Present    HTN (hypertension) I10   2013 - Present    Fall W19.XXXA   2013 - Present    Cerebrovascular accident (CVA) due to embolism of cerebral artery (CMS-HCC) I63.40   3/30/2016 - Present    Stroke (CMS-HCC) I63.9   2016 - Present    Hyperlipidemia LDL goal <70 E78.5   2016 - Present    Palpitations R00.2   2016 - Present    Obstructive sleep apnea syndrome G47.33   2016 - Present    Blunt trauma of sternum S29.8XXA   2016 - Present    Low serum vitamin D R79.89   " 12/5/2016 - Present    Hearing difficulty of both ears H91.93   3/6/2017 - Present    Need for prophylactic vaccination with Streptococcus pneumoniae (Pneumococcus) and Influenza vaccines Z23   3/6/2017 - Present    CVA (cerebral vascular accident) (CMS-HCC) I63.9   3/23/2017 - Present    Pancytopenia (CMS-HCC) D61.818   6/22/2017 - Present    Anemia D64.9   6/22/2017 - Present      Health Maintenance        Date Due Completion Dates    IMM DTaP/Tdap/Td Vaccine (1 - Tdap) 7/25/1946 ---    IMM ZOSTER VACCINE 7/25/1987 ---    BONE DENSITY 4/28/2016 4/28/2011, 10/20/2008    IMM INFLUENZA (1) 9/1/2017 12/5/2016, 11/19/2012            Current Immunizations     13-VALENT PCV PREVNAR 3/6/2017    Influenza TIV (IM) 11/19/2012    Influenza Vaccine Adult HD 12/5/2016    Pneumococcal polysaccharide vaccine (PPSV-23) 11/21/2001      Below and/or attached are the medications your provider expects you to take. Review all of your home medications and newly ordered medications with your provider and/or pharmacist. Follow medication instructions as directed by your provider and/or pharmacist. Please keep your medication list with you and share with your provider. Update the information when medications are discontinued, doses are changed, or new medications (including over-the-counter products) are added; and carry medication information at all times in the event of emergency situations     Allergies:  NKDA - (reactions not documented)     OTHER ENVIRONMENTAL - (reactions not documented)               Medications  Valid as of: August 17, 2017 -  9:37 AM    Generic Name Brand Name Tablet Size Instructions for use    Amoxicillin (Cap) AMOXIL 500 MG TAKE 4 CAPSULES BY MOUTH 1 HOUR PRIOR TO DENTAL TREATMENT        Apixaban (Tab) ELIQUIS 2.5mg Take 1 Tab by mouth 2 Times a Day.        Biotin   Take  by mouth.        Cholecalciferol (Tab) Vitamin D3 2000 units Take 4,000 Units by mouth every day.        Coenzyme Q10   Take  by mouth.       "   Cyanocobalamin (Solution) VITAMIN B-12 1000 MCG/ML INJECT 1 ML INTRAMUSCULARLY EVERY MONTH AS DIRECTED        Levothyroxine Sodium (Tab) SYNTHROID 100 MCG Take 100 mcg by mouth Every morning on an empty stomach.        Non Formulary Request Non Formulary Request  Take 2 Tabs by mouth 2 Times a Day. \"Chaniflow Chinese drug for constipation\"        .                 Medicines prescribed today were sent to:     [id number] - [place], [place] - [address] [facility]    [address] [facility] [place] [address]    Phone: [phone] Fax: [phone]    Open 24 Hours?: No      Medication refill instructions:       If your prescription bottle indicates you have medication refills left, it is not necessary to call your provider’s office. Please contact your pharmacy and they will refill your medication.    If your prescription bottle indicates you do not have any refills left, you may request refills at any time through one of the following ways: The online TheMobileGamer (TMG) system (except Urgent Care), by calling your provider’s office, or by asking your pharmacy to contact your provider’s office with a refill request. Medication refills are processed only during regular business hours and may not be available until the next business day. Your provider may request additional information or to have a follow-up visit with you prior to refilling your medication.   *Please Note: Medication refills are assigned a new Rx number when refilled electronically. Your pharmacy may indicate that no refills were authorized even though a new prescription for the same medication is available at the pharmacy. Please request the medicine by name with the pharmacy before contacting your provider for a refill.           TheMobileGamer (TMG) Access Code: [email or web address]  Expires: 9/16/2017  9:37 AM    TheMobileGamer (TMG)  A secure, online tool to manage your health information     [employer]’s TheMobileGamer (TMG)® is a secure, online tool that connects you to your personalized health " information from the privacy of your home -- day or night - making it very easy for you to manage your healthcare. Once the activation process is completed, you can even access your medical information using the Package Concierge beatriz, which is available for free in the Apple Beatriz store or Google Play store.     Package Concierge provides the following levels of access (as shown below):   My Chart Features   [facility] Primary [facility] Doctor [facility]  Specialists [facility]  Urgent  Care Non-[facility]  Primary Care  Doctor   Email your healthcare team securely and privately 24/7 X X X    Manage appointments: schedule your next appointment; view details of past/upcoming appointments X      Request prescription refills. X      View recent personal medical records, including lab and immunizations X X X X   View health record, including health history, allergies, medications X X X X   Read reports about your outpatient visits, procedures, consult and ER notes X X X X   See your discharge summary, which is a recap of your hospital and/or ER visit that includes your diagnosis, lab results, and care plan. X X       How to register for Package Concierge:  1. Go to  [email or web address].  2. Click on the Sign Up Now box, which takes you to the New Member Sign Up page. You will need to provide the following information:  a. Enter your Package Concierge Access Code exactly as it appears at the top of this page. (You will not need to use this code after you’ve completed the sign-up process. If you do not sign up before the expiration date, you must request a new code.)   b. Enter your date of birth.   c. Enter your home email address.   d. Click Submit, and follow the next screen’s instructions.  3. Create a Package Concierge ID. This will be your Package Concierge login ID and cannot be changed, so think of one that is secure and easy to remember.  4. Create a Package Concierge password. You can change your password at any time.  5. Enter your Password Reset Question and Answer. This can be used at a later  time if you forget your password.   6. Enter your e-mail address. This allows you to receive e-mail notifications when new information is available in Racemi.  7. Click Sign Up. You can now view your health information.    For assistance activating your Racemi account, call [phone]

## 2017-08-30 ENCOUNTER — TELEPHONE (OUTPATIENT)
Dept: HEALTH INFORMATION MANAGEMENT | Facility: OTHER | Age: 82
End: 2017-08-30

## 2017-08-30 DIAGNOSIS — E03.9 HYPOTHYROIDISM, UNSPECIFIED TYPE: ICD-10-CM

## 2017-08-30 DIAGNOSIS — I15.9 SECONDARY HYPERTENSION: ICD-10-CM

## 2017-08-30 DIAGNOSIS — D61.818 PANCYTOPENIA (HCC): ICD-10-CM

## 2017-08-30 DIAGNOSIS — E78.5 HYPERLIPIDEMIA LDL GOAL <70: ICD-10-CM

## 2017-08-30 DIAGNOSIS — R79.89 LOW SERUM VITAMIN D: ICD-10-CM

## 2017-08-30 NOTE — TELEPHONE ENCOUNTER
----- Message from Radha Dobbs, Med Ass't sent at 8/30/2017 10:08 AM PDT -----  Regarding: pt requesting lab orders   Good Morning,   This pt called in this morning and said she would like to have labs done before she goes to her [facility] in September   Thank You

## 2017-08-31 ENCOUNTER — TELEPHONE (OUTPATIENT)
Dept: MEDICAL GROUP | Facility: PHYSICIAN GROUP | Age: 82
End: 2017-08-31

## 2017-08-31 ENCOUNTER — HOSPITAL ENCOUNTER (OUTPATIENT)
Dept: LAB | Facility: MEDICAL CENTER | Age: 82
End: 2017-08-31
Attending: FAMILY MEDICINE
Payer: MEDICARE

## 2017-08-31 DIAGNOSIS — R79.89 LOW SERUM VITAMIN D: ICD-10-CM

## 2017-08-31 DIAGNOSIS — I15.9 SECONDARY HYPERTENSION: ICD-10-CM

## 2017-08-31 DIAGNOSIS — D61.818 PANCYTOPENIA (HCC): ICD-10-CM

## 2017-08-31 DIAGNOSIS — E78.5 HYPERLIPIDEMIA LDL GOAL <70: ICD-10-CM

## 2017-08-31 DIAGNOSIS — E03.9 HYPOTHYROIDISM, UNSPECIFIED TYPE: ICD-10-CM

## 2017-08-31 LAB
25(OH)D3 SERPL-MCNC: 44 NG/ML (ref 30–100)
ALBUMIN SERPL BCP-MCNC: 3.9 G/DL (ref 3.2–4.9)
ALBUMIN/GLOB SERPL: 1.3 G/DL
ALP SERPL-CCNC: 58 U/L (ref 30–99)
ALT SERPL-CCNC: 7 U/L (ref 2–50)
ANION GAP SERPL CALC-SCNC: 6 MMOL/L (ref 0–11.9)
AST SERPL-CCNC: 14 U/L (ref 12–45)
BASOPHILS # BLD AUTO: 0.3 % (ref 0–1.8)
BASOPHILS # BLD: 0.01 K/UL (ref 0–0.12)
BILIRUB SERPL-MCNC: 0.4 MG/DL (ref 0.1–1.5)
BUN SERPL-MCNC: 10 MG/DL (ref 8–22)
CALCIUM SERPL-MCNC: 8.9 MG/DL (ref 8.5–10.5)
CHLORIDE SERPL-SCNC: 109 MMOL/L (ref 96–112)
CHOLEST SERPL-MCNC: 163 MG/DL (ref 100–199)
CO2 SERPL-SCNC: 25 MMOL/L (ref 20–33)
CREAT SERPL-MCNC: 0.7 MG/DL (ref 0.5–1.4)
EOSINOPHIL # BLD AUTO: 0.09 K/UL (ref 0–0.51)
EOSINOPHIL NFR BLD: 2.7 % (ref 0–6.9)
ERYTHROCYTE [DISTWIDTH] IN BLOOD BY AUTOMATED COUNT: 50 FL (ref 35.9–50)
FASTING STATUS PATIENT QL REPORTED: NORMAL
GFR SERPL CREATININE-BSD FRML MDRD: >60 ML/MIN/1.73 M 2
GLOBULIN SER CALC-MCNC: 3.1 G/DL (ref 1.9–3.5)
GLUCOSE SERPL-MCNC: 85 MG/DL (ref 65–99)
HCT VFR BLD AUTO: 38.8 % (ref 37–47)
HDLC SERPL-MCNC: 43 MG/DL
HGB BLD-MCNC: 12.4 G/DL (ref 12–16)
IMM GRANULOCYTES # BLD AUTO: 0.01 K/UL (ref 0–0.11)
IMM GRANULOCYTES NFR BLD AUTO: 0.3 % (ref 0–0.9)
LDLC SERPL CALC-MCNC: 98 MG/DL
LYMPHOCYTES # BLD AUTO: 1.78 K/UL (ref 1–4.8)
LYMPHOCYTES NFR BLD: 54.3 % (ref 22–41)
MCH RBC QN AUTO: 30.8 PG (ref 27–33)
MCHC RBC AUTO-ENTMCNC: 32 G/DL (ref 33.6–35)
MCV RBC AUTO: 96.5 FL (ref 81.4–97.8)
MONOCYTES # BLD AUTO: 0.3 K/UL (ref 0–0.85)
MONOCYTES NFR BLD AUTO: 9.1 % (ref 0–13.4)
NEUTROPHILS # BLD AUTO: 1.09 K/UL (ref 2–7.15)
NEUTROPHILS NFR BLD: 33.3 % (ref 44–72)
NRBC # BLD AUTO: 0 K/UL
NRBC BLD AUTO-RTO: 0 /100 WBC
PLATELET # BLD AUTO: 118 K/UL (ref 164–446)
PMV BLD AUTO: 11.4 FL (ref 9–12.9)
POTASSIUM SERPL-SCNC: 4.1 MMOL/L (ref 3.6–5.5)
PROT SERPL-MCNC: 7 G/DL (ref 6–8.2)
RBC # BLD AUTO: 4.02 M/UL (ref 4.2–5.4)
SODIUM SERPL-SCNC: 140 MMOL/L (ref 135–145)
T4 FREE SERPL-MCNC: 0.9 NG/DL (ref 0.53–1.43)
TRIGL SERPL-MCNC: 108 MG/DL (ref 0–149)
TSH SERPL DL<=0.005 MIU/L-ACNC: 1.13 UIU/ML (ref 0.3–3.7)
WBC # BLD AUTO: 3.3 K/UL (ref 4.8–10.8)

## 2017-08-31 PROCEDURE — 80053 COMPREHEN METABOLIC PANEL: CPT

## 2017-08-31 PROCEDURE — 84439 ASSAY OF FREE THYROXINE: CPT

## 2017-08-31 PROCEDURE — 36415 COLL VENOUS BLD VENIPUNCTURE: CPT

## 2017-08-31 PROCEDURE — 80061 LIPID PANEL: CPT

## 2017-08-31 PROCEDURE — 85025 COMPLETE CBC W/AUTO DIFF WBC: CPT

## 2017-08-31 PROCEDURE — 82306 VITAMIN D 25 HYDROXY: CPT

## 2017-08-31 PROCEDURE — 84443 ASSAY THYROID STIM HORMONE: CPT

## 2017-09-01 ENCOUNTER — RX ONLY (OUTPATIENT)
Age: 82
Setting detail: RX ONLY
End: 2017-09-01

## 2017-09-07 ENCOUNTER — OFFICE VISIT (OUTPATIENT)
Dept: MEDICAL GROUP | Facility: PHYSICIAN GROUP | Age: 82
End: 2017-09-07
Payer: MEDICARE

## 2017-09-07 VITALS
WEIGHT: 150 LBS | HEART RATE: 72 BPM | HEIGHT: 64 IN | BODY MASS INDEX: 25.61 KG/M2 | DIASTOLIC BLOOD PRESSURE: 70 MMHG | TEMPERATURE: 97.3 F | OXYGEN SATURATION: 97 % | SYSTOLIC BLOOD PRESSURE: 146 MMHG

## 2017-09-07 DIAGNOSIS — G47.33 OBSTRUCTIVE SLEEP APNEA SYNDROME: ICD-10-CM

## 2017-09-07 DIAGNOSIS — Z00.00 MEDICARE ANNUAL WELLNESS VISIT, SUBSEQUENT: ICD-10-CM

## 2017-09-07 DIAGNOSIS — E78.5 HYPERLIPIDEMIA LDL GOAL <70: ICD-10-CM

## 2017-09-07 DIAGNOSIS — I63.40 CEREBROVASCULAR ACCIDENT (CVA) DUE TO EMBOLISM OF CEREBRAL ARTERY (HCC): ICD-10-CM

## 2017-09-07 DIAGNOSIS — Z78.0 MENOPAUSE: ICD-10-CM

## 2017-09-07 DIAGNOSIS — R79.89 LOW SERUM VITAMIN D: ICD-10-CM

## 2017-09-07 DIAGNOSIS — H91.93 HEARING DIFFICULTY OF BOTH EARS: ICD-10-CM

## 2017-09-07 DIAGNOSIS — E03.9 ACQUIRED HYPOTHYROIDISM: ICD-10-CM

## 2017-09-07 DIAGNOSIS — I15.9 SECONDARY HYPERTENSION: ICD-10-CM

## 2017-09-07 DIAGNOSIS — Z23 NEED FOR INFLUENZA VACCINATION: ICD-10-CM

## 2017-09-07 PROBLEM — I63.9 CVA (CEREBRAL VASCULAR ACCIDENT) (HCC): Status: RESOLVED | Noted: 2017-03-23 | Resolved: 2017-09-07

## 2017-09-07 PROBLEM — D64.9 ANEMIA: Status: RESOLVED | Noted: 2017-06-22 | Resolved: 2017-09-07

## 2017-09-07 PROCEDURE — G0439 PPPS, SUBSEQ VISIT: HCPCS | Mod: 25 | Performed by: FAMILY MEDICINE

## 2017-09-07 PROCEDURE — 90662 IIV NO PRSV INCREASED AG IM: CPT | Performed by: FAMILY MEDICINE

## 2017-09-07 PROCEDURE — G0008 ADMIN INFLUENZA VIRUS VAC: HCPCS | Performed by: FAMILY MEDICINE

## 2017-09-07 RX ORDER — LEVOTHYROXINE SODIUM 0.07 MG/1
75 TABLET ORAL
Qty: 90 TAB | Refills: 3 | Status: SHIPPED | OUTPATIENT
Start: 2017-09-07 | End: 2018-07-16

## 2017-09-07 ASSESSMENT — PATIENT HEALTH QUESTIONNAIRE - PHQ9: CLINICAL INTERPRETATION OF PHQ2 SCORE: 0

## 2017-09-07 NOTE — PROGRESS NOTES
"Chief Complaint   Patient presents with   • Annual Exam         HPI:  Afshan Love is a 89 y.o. here for Medicare Annual Wellness Visit     Patient Active Problem List    Diagnosis Date Noted   • Pancytopenia (CMS-HCC) 06/22/2017   • Hearing difficulty of both ears 03/06/2017   • Low serum vitamin D 12/05/2016   • Obstructive sleep apnea syndrome 08/25/2016   • Hyperlipidemia LDL goal <70 04/27/2016   • Cerebrovascular accident (CVA) due to embolism of cerebral artery (CMS-HCC) 03/30/2016   • Hypothyroid 05/22/2013   • HTN (hypertension) 05/22/2013       Current Outpatient Prescriptions   Medication Sig Dispense Refill   • levothyroxine (SYNTHROID) 75 MCG Tab Take 1 Tab by mouth Every morning on an empty stomach. 90 Tab 3   • levothyroxine (SYNTHROID) 100 MCG Tab Take 100 mcg by mouth Every morning on an empty stomach.     • apixaban (ELIQUIS) 2.5mg Tab Take 1 Tab by mouth 2 Times a Day. 60 Tab 3   • Cholecalciferol (VITAMIN D3) 2000 UNITS Tab Take 4,000 Units by mouth every day.     • BIOTIN PO Take  by mouth.     • cyanocobalamin (VITAMIN B-12) 1000 MCG/ML Solution INJECT 1 ML INTRAMUSCULARLY EVERY MONTH AS DIRECTED 1 Vial 11   • Coenzyme Q10 (CO Q 10 PO) Take  by mouth.     • Non Formulary Request Take 2 Tabs by mouth 2 Times a Day. \"Chaniflow Chinese drug for constipation\"       No current facility-administered medications for this visit.             Current supplements as per medication list.       Allergies: Nkda [no known drug allergy] and Other environmental    Current social contact/activities: Meets with a group twice a month     She  reports that she quit smoking about 56 years ago. Her smoking use included Cigarettes. She has a 20.00 pack-year smoking history. She has never used smokeless tobacco. She reports that she drinks alcohol. She reports that she does not use drugs.  Counseling given: Not Answered        DPA/Advanced Directive:  Patient does not have an Advanced Directive.  A packet and " workshop information was given on Advanced Directives.      ROS:    Gait: Uses a cane   Ostomy: no   Other tubes: no   Amputations: no   Chronic oxygen use: no   Last eye exam:01/16   : Reports incontinence.       Screening:    Depression Screening    Little interest or pleasure in doing things?  0 - not at all  Feeling down, depressed , or hopeless? 0 - not at all  Patient Health Questionnaire Score: 0     If depressive symptoms identified deferred to follow up visit unless specifically addressed in assessment and plan.    Interpretation of PHQ-9 Total Score   Score Severity   1-4 No Depression   5-9 Mild Depression   10-14 Moderate Depression   15-19 Moderately Severe Depression   20-27 Severe Depression    Screening for Cognitive Impairment    Three Minute Recall (apple, watch, ramya)  3/3 Ramya, apple, watch  Draw clock face with all 12 numbers set to the hand to show 10 minutes past 11 o'clock  0    Cognitive concerns identified deferred for follow up unless specifically addressed in assessment and plan.    Fall Risk Assessment    Has the patient had two or more falls in the last year or any fall with injury in the last year?  No    Safety Assessment    Throw rugs on floor.  Yes  Handrails on all stairs.  Yes  Good lighting in all hallways.  Yes  Difficulty hearing.  Yes  Patient counseled about all safety risks that were identified.    Functional Assessment ADLs    Are there any barriers preventing you from cooking for yourself or meeting nutritional needs?  No.    Are there any barriers preventing you from driving safely or obtaining transportation?  No.    Are there any barriers preventing you from using a telephone or calling for help?  No.    Are there any barriers preventing you from shopping?  No.    Are there any barriers preventing you from taking care of your own finances?  No.    Are there any barriers preventing you from managing your medications?  No.    Are currently engaging any exercise or  "physical activity?  No.       Health Maintenance Summary                Annual Wellness Visit Overdue 7/25/1927     IMM DTaP/Tdap/Td Vaccine Overdue 7/25/1946     BONE DENSITY Overdue 4/28/2016      Done 4/28/2011 DS-BONE DENSITY STUDY (DEXA)     Patient has more history with this topic...    IMM INFLUENZA Overdue 9/1/2017      Done 12/5/2016 Imm Admin: Influenza Vaccine Adult HD     Patient has more history with this topic...          Patient Care Team:  Caitlin Rojas D.O. as PCP - General (Family Medicine)      Social History   Substance Use Topics   • Smoking status: Former Smoker     Packs/day: 1.00     Years: 20.00     Types: Cigarettes     Quit date: 5/21/1961   • Smokeless tobacco: Never Used   • Alcohol use Yes      Comment: 1 PER MO     Family History   Problem Relation Age of Onset   • Cancer Sister    • Psychiatry Father      suicide   • Heart Disease Son      extra heart beat   • Sleep Apnea Neg Hx      She  has a past medical history of Arthritis; Bronchitis; Bruxism; CATARACT; Chickenpox; COPD (chronic obstructive pulmonary disease) (CMS-HCC); Fibromyalgia; Togolese measles; Hyperlipidemia; Pericarditis (1960s); Pneumonia; Restless leg syndrome; Sleep apnea; Snoring; and Unspecified disorder of thyroid.   Past Surgical History:   Procedure Laterality Date   • CATARACT PHACO WITH IOL  12/11/2013    Performed by Oma Dockery M.D. at SURGERY SAME DAY [facility] ORS   • FEMUR ORIF  5/22/2013    Performed by Johan Tsai M.D. at SURGERY [facility] ORS   • APPENDECTOMY     • HIP REPLACEMENT, TOTAL     • OTHER      eyelids fixed drooped below pupil   • PB REMV 2ND CATARACT,CORN-SCLER SECTN     • THYROIDECTOMY     • TONSILLECTOMY AND ADENOIDECTOMY         Exam:     Blood pressure 146/70, pulse 72, temperature 36.3 °C (97.3 °F), height 1.626 m (5' 4\"), weight 68 kg (150 lb), SpO2 97 %. Body mass index is 25.75 kg/m².    Hearing good.    Dentition good  Alert, oriented in no acute distress.  Eye contact is " good, speech goal directed, affect calm      Assessment and Plan. The following treatment and monitoring plan is recommended:    1. Medicare annual wellness visit, subsequent      Chart reviewed and updated   2. Obstructive sleep apnea syndrome      Stable. Monitor   3. Low serum vitamin D      Stable. Continue current supplementation; monitor   4. Acquired hypothyroidism      Stable. Continue current medication; monitor   5. Hyperlipidemia LDL goal <70      Stable. Monitor   6. Secondary hypertension      Stable. Monitor   7. Cerebrovascular accident (CVA) due to embolism of cerebral artery (CMS-HCC)      Stable. Continue current medications; monitor   8. Hearing difficulty of both ears      Stable. Monitor   9. Menopause  DS-BONE DENSITY STUDY (DEXA)    DEXA scan ordered; monitor for results   10. Need for influenza vaccination  INFLUENZA VACCINE, HIGH DOSE (65+ ONLY)    Age appropriate immunization provided; patient tolerated procedure well         Services suggested: No services needed at this time  Health Care Screening: Age-appropriate preventive services Medicare covers discussed today and ordered if indicated.  Referrals offered: Community-based lifestyle interventions to reduce health risks and promote self-management and wellness, fall prevention, nutrition, physical activity, tobacco-use cessation, weight loss, and mental health services as per orders if indicated.    Discussion today about general wellness and lifestyle habits:    · Prevent falls and reduce trip hazards; Cautioned about securing or removing rugs.  · Have a working fire alarm and carbon monoxide detector;   · Engage in regular physical activity and social activities       Follow-up: Return in about 2 weeks (around 9/21/2017) for questions, Short.

## 2017-09-26 ENCOUNTER — APPOINTMENT (OUTPATIENT)
Dept: MEDICAL GROUP | Facility: PHYSICIAN GROUP | Age: 82
End: 2017-09-26
Payer: MEDICARE

## 2017-10-13 ENCOUNTER — OFFICE VISIT (OUTPATIENT)
Dept: MEDICAL GROUP | Facility: PHYSICIAN GROUP | Age: 82
End: 2017-10-13
Payer: MEDICARE

## 2017-10-13 VITALS
RESPIRATION RATE: 14 BRPM | SYSTOLIC BLOOD PRESSURE: 114 MMHG | DIASTOLIC BLOOD PRESSURE: 60 MMHG | WEIGHT: 147 LBS | HEIGHT: 64 IN | HEART RATE: 74 BPM | TEMPERATURE: 97 F | BODY MASS INDEX: 25.1 KG/M2 | OXYGEN SATURATION: 96 %

## 2017-10-13 DIAGNOSIS — R79.89 LOW SERUM VITAMIN D: ICD-10-CM

## 2017-10-13 DIAGNOSIS — E03.9 ACQUIRED HYPOTHYROIDISM: ICD-10-CM

## 2017-10-13 DIAGNOSIS — D61.818 PANCYTOPENIA (HCC): ICD-10-CM

## 2017-10-13 PROCEDURE — 99214 OFFICE O/P EST MOD 30 MIN: CPT | Performed by: FAMILY MEDICINE

## 2017-10-13 NOTE — PROGRESS NOTES
"Subjective:   Afshan Love is a 89 y.o. female here today for lab review; low vit d; thyroid    Pancytopenia (CMS-HCC)  Ongoing issue. Recent blood work continues to show slightly suppressed white blood cell count. Patient also has a decrease in her platelet count. She denies any issues with bleeding such as from mucous membranes. She denies any persistent infection; she denies any enlarged lymph nodes.    Low serum vitamin D  Ongoing issue. Patient reports that she is taking supplemental vitamin D. Recent blood work shows that her vitamin D level has improved to 44.    Hypothyroid  Ongoing issue. Patient reports that she is taking levothyroxine 75 µg daily. Recent lab work shows that her TSH and T4 both normal. Patient denies any issues or temperature intolerance, skin changes, hair changes.         Current medicines (including changes today)  Current Outpatient Prescriptions   Medication Sig Dispense Refill   • levothyroxine (SYNTHROID) 75 MCG Tab Take 1 Tab by mouth Every morning on an empty stomach. 90 Tab 3   • levothyroxine (SYNTHROID) 100 MCG Tab Take 100 mcg by mouth Every morning on an empty stomach.     • apixaban (ELIQUIS) 2.5mg Tab Take 1 Tab by mouth 2 Times a Day. 60 Tab 3   • Cholecalciferol (VITAMIN D3) 2000 UNITS Tab Take 4,000 Units by mouth every day.     • BIOTIN PO Take  by mouth.     • cyanocobalamin (VITAMIN B-12) 1000 MCG/ML Solution INJECT 1 ML INTRAMUSCULARLY EVERY MONTH AS DIRECTED 1 Vial 11   • Non Formulary Request Take 2 Tabs by mouth 2 Times a Day. \"Chaniflow Chinese drug for constipation\"     • Coenzyme Q10 (CO Q 10 PO) Take  by mouth.       No current facility-administered medications for this visit.      She  has a past medical history of Arthritis; Bronchitis; Bruxism; CATARACT; Chickenpox; COPD (chronic obstructive pulmonary disease) (CMS-[facility]); Fibromyalgia; Sammarinese measles; Hyperlipidemia; Pericarditis (1960s); Pneumonia; Restless leg syndrome; Sleep apnea; Snoring; and " "Unspecified disorder of thyroid.    ROS   No chest pain, no shortness of breath, no abdominal pain       Objective:     Blood pressure 114/60, pulse 74, temperature 36.1 °C (97 °F), resp. rate 14, height 1.626 m (5' 4\"), weight 66.7 kg (147 lb), SpO2 96 %. Body mass index is 25.23 kg/m².   Physical Exam:  Alert, oriented in no acute distress.  Eye contact is good, speech goal directed, affect calm  HEENT: conjunctiva non-injected, sclera non-icteric.  Pinna normal. Oral mucous membranes pink and moist with no lesions.  Neck No adenopathy or masses in the neck or supraclavicular regions.  Lungs: clear to auscultation bilaterally with good excursion.  CV: regular rate and rhythm.  Abdomen: soft, nontender, No CVAT  Ext: no edema, color normal, vascularity normal, temperature normal        Assessment and Plan:   The following treatment plan was discussed     1. Pancytopenia (CMS-HCC)      Stable. Continue to monitor closely. May need to consider referral to hematology if numbers continue to decrease.   2. Low serum vitamin D      Improved. Continue current supplementation. Monitor   3. Acquired hypothyroidism      Stable. Continue current medications; monitor       Followup: Return in about 6 months (around 4/13/2018) for HTN/medication, Short.            "

## 2017-10-13 NOTE — ASSESSMENT & PLAN NOTE
Ongoing issue. Patient reports that she is taking supplemental vitamin D. Recent blood work shows that her vitamin D level has improved to 44.

## 2017-10-13 NOTE — ASSESSMENT & PLAN NOTE
Ongoing issue. Patient reports that she is taking levothyroxine 75 µg daily. Recent lab work shows that her TSH and T4 both normal. Patient denies any issues or temperature intolerance, skin changes, hair changes.

## 2017-10-13 NOTE — ASSESSMENT & PLAN NOTE
Ongoing issue. Recent blood work continues to show slightly suppressed white blood cell count. Patient also has a decrease in her platelet count. She denies any issues with bleeding such as from mucous membranes. She denies any persistent infection; she denies any enlarged lymph nodes.

## 2017-10-27 ENCOUNTER — PATIENT OUTREACH (OUTPATIENT)
Dept: HEALTH INFORMATION MANAGEMENT | Facility: OTHER | Age: 82
End: 2017-10-27

## 2017-10-27 NOTE — PROGRESS NOTES
Outcome:MAILBOX FULL     Please transfer to Patient Outreach Team at [phone] when patient returns call.    WebIZ Checked & Epic Updated:  yes    [facility] Verified: yes    Attempt # 1

## 2017-11-03 ENCOUNTER — APPOINTMENT (OUTPATIENT)
Dept: RADIOLOGY | Facility: MEDICAL CENTER | Age: 82
End: 2017-11-03
Attending: FAMILY MEDICINE
Payer: MEDICARE

## 2017-11-17 ENCOUNTER — OFFICE VISIT (OUTPATIENT)
Dept: CARDIOLOGY | Facility: MEDICAL CENTER | Age: 82
End: 2017-11-17
Payer: MEDICARE

## 2017-11-17 VITALS
DIASTOLIC BLOOD PRESSURE: 60 MMHG | HEIGHT: 64 IN | OXYGEN SATURATION: 96 % | WEIGHT: 147 LBS | HEART RATE: 66 BPM | BODY MASS INDEX: 25.1 KG/M2 | SYSTOLIC BLOOD PRESSURE: 130 MMHG

## 2017-11-17 DIAGNOSIS — I15.9 SECONDARY HYPERTENSION: ICD-10-CM

## 2017-11-17 DIAGNOSIS — I48.0 PAF (PAROXYSMAL ATRIAL FIBRILLATION) (HCC): ICD-10-CM

## 2017-11-17 DIAGNOSIS — I63.40 CEREBROVASCULAR ACCIDENT (CVA) DUE TO EMBOLISM OF CEREBRAL ARTERY (HCC): ICD-10-CM

## 2017-11-17 PROCEDURE — 99214 OFFICE O/P EST MOD 30 MIN: CPT | Performed by: INTERNAL MEDICINE

## 2017-11-17 NOTE — PROGRESS NOTES
"Arrhythmia Clinic Note (Established patient)    DOS: 11/17/2017    Chief complaint/Reason for consult: F/u pAF    Interval History:  Patient is a 89 yo F with history of prior CVA s/p ILR showing AF that we initiated OAC for. She is here for follow-up. Her main complaint today is cost of the medication.    ROS (+ highlighted in red):  Constitutional: Fevers/chills/fatigue/weightloss  Respiratory: Shortness of breath/cough  Cardiovascular: Chest pain/palpitations/edema/orthopnea/syncope    Past Medical History:   Diagnosis Date   • Arthritis    • Bronchitis    • Bruxism    • CATARACT    • Chickenpox    • COPD (chronic obstructive pulmonary disease) (CMS-[facility])    • Fibromyalgia    • Frisian measles    • Hyperlipidemia    • Pericarditis 1960s   • Pneumonia    • Restless leg syndrome    • Sleep apnea     stopped cpap due to sinus problems   • Snoring     sleep study done   • Unspecified disorder of thyroid        Allergies   Allergen Reactions   • Nkda [No Known Drug Allergy]    • Other Environmental      Smoke, perfume       Current Outpatient Prescriptions   Medication Sig Dispense Refill   • apixaban (ELIQUIS) 2.5mg Tab Take 1 Tab by mouth 2 Times a Day. 60 Tab 0   • levothyroxine (SYNTHROID) 75 MCG Tab Take 1 Tab by mouth Every morning on an empty stomach. 90 Tab 3   • levothyroxine (SYNTHROID) 100 MCG Tab Take 100 mcg by mouth Every morning on an empty stomach.     • apixaban (ELIQUIS) 2.5mg Tab Take 1 Tab by mouth 2 Times a Day. 60 Tab 3   • Cholecalciferol (VITAMIN D3) 2000 UNITS Tab Take 4,000 Units by mouth every day.     • BIOTIN PO Take  by mouth.     • cyanocobalamin (VITAMIN B-12) 1000 MCG/ML Solution INJECT 1 ML INTRAMUSCULARLY EVERY MONTH AS DIRECTED 1 Vial 11   • Coenzyme Q10 (CO Q 10 PO) Take  by mouth.     • Non Formulary Request Take 2 Tabs by mouth 2 Times a Day. \"Chaniflow Chinese drug for constipation\"       No current facility-administered medications for this visit.        Physical Exam:  Vitals: " "   11/17/17 0938   BP: 130/60   Pulse: 66   SpO2: 96%   Weight: 66.7 kg (147 lb)   Height: 1.626 m (5' 4.02\")     General appearance: NAD, conversant   Eyes: anicteric sclerae, moist conjunctivae; no lid-lag; PERRLA  HENT: Atraumatic; oropharynx clear with moist mucous membranes and no mucosal ulcerations; normal hard and soft palate  Neck: Trachea midline; FROM, supple, no thyromegaly or lymphadenopathy  Lungs: CTA, with normal respiratory effort and no intercostal retractions  CV: RRR, no MRGs, no JVD  Abdomen: Soft, non-tender; no masses or HSM  Extremities: No peripheral edema or extremity lymphadenopathy  Skin: Normal temperature, turgor and texture; no rash, ulcers or subcutaneous nodules  Psych: Appropriate affect, alert and oriented to person, place and time    Data:  Labs reviewed    Prior echo/stress reviewed    ILR recorder interrogation reviewed, ~1% AF burden    Impression/Plan:  1. PAF (paroxysmal atrial fibrillation) (CMS-HCC)  CANCELED: EKG   2. Secondary hypertension     3. Cerebrovascular accident (CVA) due to embolism of cerebral artery (CMS-HCC)       -Minimally symptomatic in AF  -She is concerned about being on too many medications and costs, so I did not broach the subject of AADs for her  -She did express concern again about the cost of Eliquis  -I will provide her with rx for 30 day sample of this, we can do this as needed for her  -Otherwise I spoke with her about advantages/disadvantages vs warfarin and she wants to stick with the DOAC    Kaylee Valerio MD    "

## 2018-01-10 ENCOUNTER — TELEPHONE (OUTPATIENT)
Dept: CARDIOLOGY | Facility: MEDICAL CENTER | Age: 83
End: 2018-01-10

## 2018-01-10 NOTE — TELEPHONE ENCOUNTER
----- Message from Sindi Villa R.N. sent at 1/10/2018 11:41 AM PST -----  Regarding: FW: Medication sample pickup for eliquis    S/w pharmacist. Out of samples of Eliquis, but pt. has not used a coupon yet, so OK to send her over & he can give her #60 with coupon. Pt's mailbox is full, so will have to call her back.    ----- Message -----  From: Demetria Larsen, Med Ass't  Sent: 1/10/2018  10:46 AM  To: Sindi Villa R.N.  Subject: FW: Medication sample pickup for eliquis         According to the inventory there is no samples available    ----- Message -----  From: Noemi Orellana, Med Ass't  Sent: 1/10/2018  10:38 AM  To: Demetria Larsen Med Ass't  Subject: Medication sample pickup for eliquis             SS    Please call patient when she can go pick this up.    Thanks

## 2018-02-05 ENCOUNTER — TELEPHONE (OUTPATIENT)
Dept: CARDIOLOGY | Facility: MEDICAL CENTER | Age: 83
End: 2018-02-05

## 2018-02-05 ENCOUNTER — DEVICE CHECK (OUTPATIENT)
Dept: CARDIOLOGY | Facility: MEDICAL CENTER | Age: 83
End: 2018-02-05

## 2018-02-05 NOTE — PROGRESS NOTES
Type of monitoring: Remote    : [employer]    Date of Transmission: 2-3-2018    Type of device: Implantable Loop Recorder          Episodes: 2-Pause episodes, longest = 3 seconds on 1-.  Presenting rhythm= SR  Task to Dr. Valerio

## 2018-02-05 NOTE — TELEPHONE ENCOUNTER
Fyi: remote monitorin-pause episodes, longest = 3 seconds on 2018 at 02:10am.  To be scanned for viewing in Epic.  connie Hernandez

## 2018-02-16 DIAGNOSIS — I48.0 PAF (PAROXYSMAL ATRIAL FIBRILLATION) (HCC): ICD-10-CM

## 2018-03-14 DIAGNOSIS — I48.0 PAF (PAROXYSMAL ATRIAL FIBRILLATION) (HCC): ICD-10-CM

## 2018-04-18 ENCOUNTER — OFFICE VISIT (OUTPATIENT)
Dept: MEDICAL GROUP | Facility: PHYSICIAN GROUP | Age: 83
End: 2018-04-18
Payer: MEDICARE

## 2018-04-18 VITALS
WEIGHT: 140 LBS | OXYGEN SATURATION: 98 % | HEIGHT: 64 IN | BODY MASS INDEX: 23.9 KG/M2 | HEART RATE: 70 BPM | DIASTOLIC BLOOD PRESSURE: 62 MMHG | SYSTOLIC BLOOD PRESSURE: 118 MMHG | TEMPERATURE: 97.3 F | RESPIRATION RATE: 14 BRPM

## 2018-04-18 DIAGNOSIS — I15.9 SECONDARY HYPERTENSION: ICD-10-CM

## 2018-04-18 DIAGNOSIS — E03.9 ACQUIRED HYPOTHYROIDISM: ICD-10-CM

## 2018-04-18 DIAGNOSIS — R79.89 LOW SERUM VITAMIN D: ICD-10-CM

## 2018-04-18 DIAGNOSIS — D61.818 PANCYTOPENIA (HCC): ICD-10-CM

## 2018-04-18 DIAGNOSIS — H61.23 BILATERAL IMPACTED CERUMEN: ICD-10-CM

## 2018-04-18 DIAGNOSIS — E78.5 HYPERLIPIDEMIA LDL GOAL <70: ICD-10-CM

## 2018-04-18 PROCEDURE — 99214 OFFICE O/P EST MOD 30 MIN: CPT | Performed by: FAMILY MEDICINE

## 2018-04-18 NOTE — ASSESSMENT & PLAN NOTE
New problem. Patient presents today and states that she is having difficulty hearing despite using her hearing aids.

## 2018-04-18 NOTE — PROGRESS NOTES
"Subjective:   Afshan Love is a 89 y.o. female here today for thyroid, hypertension, decreased hearing, low vitamin D    Hypothyroid  Ongoing issue; patient compliant with levothyroxine 75 µg daily and currently denies any issues or temperature intolerance, skin changes, hair changes    HTN (hypertension)  Ongoing issues; patient currently is doing well and does not require medication for this problem; she currently denies any headache, dizziness, chest pain    Hyperlipidemia LDL goal <70  Ongoing issues; patient does not take any medication for this problem but does eat healthy and get regular daily exercise.    Low serum vitamin D  Ongoing issue; patient reports that she is taking 2000 international units twice daily    Pancytopenia (CMS-HCC)  Ongoing issues; patient currently denies any issues with bleeding from mucous membranes; denies any persistent infection; denies any enlarged lymph nodes    Bilateral impacted cerumen  New problem. Patient presents today and states that she is having difficulty hearing despite using her hearing aids.         Current medicines (including changes today)  Current Outpatient Prescriptions   Medication Sig Dispense Refill   • apixaban (ELIQUIS) 2.5mg Tab Take 1 Tab by mouth 2 Times a Day. 60 Tab 0   • levothyroxine (SYNTHROID) 75 MCG Tab Take 1 Tab by mouth Every morning on an empty stomach. 90 Tab 3   • Cholecalciferol (VITAMIN D3) 2000 UNITS Tab Take 4,000 Units by mouth every day.     • Coenzyme Q10 (CO Q 10 PO) Take  by mouth.     • BIOTIN PO Take  by mouth.     • cyanocobalamin (VITAMIN B-12) 1000 MCG/ML Solution INJECT 1 ML INTRAMUSCULARLY EVERY MONTH AS DIRECTED 1 Vial 11   • Non Formulary Request Take 2 Tabs by mouth 2 Times a Day. \"Chaniflow Chinese drug for constipation\"       No current facility-administered medications for this visit.      She  has a past medical history of Arthritis; Bronchitis; Bruxism; CATARACT; Chickenpox; COPD (chronic obstructive " "pulmonary disease) (CMS-HCC); Fibromyalgia; Ukrainian measles; Hyperlipidemia; Pericarditis (1960s); Pneumonia; Restless leg syndrome; Sleep apnea; Snoring; and Unspecified disorder of thyroid.    ROS   No chest pain, no shortness of breath, no abdominal pain  + Decreased hearing bilaterally     Objective:     Blood pressure 118/62, pulse 70, temperature 36.3 °C (97.3 °F), resp. rate 14, height 1.626 m (5' 4\"), weight 63.5 kg (140 lb), SpO2 98 %. Body mass index is 24.03 kg/m².   Physical Exam:  Alert, oriented in no acute distress.  Eye contact is good, speech goal directed, affect calm  HEENT: conjunctiva non-injected, sclera non-icteric.  Pinna normal. TM pearly gray after cerumen removal.   Oral mucous membranes pink and moist with no lesions.  Neck No adenopathy or masses in the neck or supraclavicular regions.  Lungs: clear to auscultation bilaterally with good excursion.  CV: regular rate and rhythm. Moderate systolic ejection murmurs appreciated ( unchanged)  Abdomen: soft, nontender, No CVAT  Ext: no edema, color normal, vascularity normal, temperature normal        Assessment and Plan:   The following treatment plan was discussed     1. Pancytopenia (CMS-HCC)  CBC WITH DIFFERENTIAL    Stable. Labs ordered for reevaluation; monitor   2. Acquired hypothyroidism  TSH    FREE THYROXINE    Stable. Continue current medications; labs ordered for reevaluation; monitor   3. Secondary hypertension      Stable. Monitor   4. Hyperlipidemia LDL goal <70  COMP METABOLIC PANEL    LIPID PROFILE    Stable. Continue healthy lifestyle; monitor; labs ordered for reevaluation   5. Low serum vitamin D  VITAMIN D,25 HYDROXY    Uncontrolled; continue supplementation; recheck labs   6. Bilateral impacted cerumen      Resolved via water pressure device by MA. Monitor       Followup: Return in about 6 months (around 10/18/2018) for lab review, Short.            "

## 2018-04-18 NOTE — ASSESSMENT & PLAN NOTE
Ongoing issues; patient currently is doing well and does not require medication for this problem; she currently denies any headache, dizziness, chest pain

## 2018-04-18 NOTE — ASSESSMENT & PLAN NOTE
Ongoing issues; patient currently denies any issues with bleeding from mucous membranes; denies any persistent infection; denies any enlarged lymph nodes

## 2018-04-18 NOTE — ASSESSMENT & PLAN NOTE
Ongoing issue; patient compliant with levothyroxine 75 µg daily and currently denies any issues or temperature intolerance, skin changes, hair changes

## 2018-04-18 NOTE — ASSESSMENT & PLAN NOTE
Ongoing issues; patient does not take any medication for this problem but does eat healthy and get regular daily exercise.

## 2018-04-27 ENCOUNTER — TELEPHONE (OUTPATIENT)
Dept: CARDIOLOGY | Facility: MEDICAL CENTER | Age: 83
End: 2018-04-27

## 2018-04-27 NOTE — TELEPHONE ENCOUNTER
fyi: remote transmission:  PAF episodes, longest >6 hours on 4-.  One-NST episode on 4-.  To be scanned into media.  connie fox

## 2018-06-06 ENCOUNTER — APPOINTMENT (RX ONLY)
Dept: URBAN - METROPOLITAN AREA CLINIC 4 | Facility: CLINIC | Age: 83
Setting detail: DERMATOLOGY
End: 2018-06-06

## 2018-06-06 DIAGNOSIS — L85.3 XEROSIS CUTIS: ICD-10-CM

## 2018-06-06 DIAGNOSIS — L82.0 INFLAMED SEBORRHEIC KERATOSIS: ICD-10-CM

## 2018-06-06 DIAGNOSIS — Z85.828 PERSONAL HISTORY OF OTHER MALIGNANT NEOPLASM OF SKIN: ICD-10-CM

## 2018-06-06 DIAGNOSIS — D18.0 HEMANGIOMA: ICD-10-CM

## 2018-06-06 DIAGNOSIS — L81.4 OTHER MELANIN HYPERPIGMENTATION: ICD-10-CM

## 2018-06-06 DIAGNOSIS — L57.0 ACTINIC KERATOSIS: ICD-10-CM

## 2018-06-06 DIAGNOSIS — L82.1 OTHER SEBORRHEIC KERATOSIS: ICD-10-CM

## 2018-06-06 PROBLEM — D18.01 HEMANGIOMA OF SKIN AND SUBCUTANEOUS TISSUE: Status: ACTIVE | Noted: 2018-06-06

## 2018-06-06 PROBLEM — E03.9 HYPOTHYROIDISM, UNSPECIFIED: Status: ACTIVE | Noted: 2018-06-06

## 2018-06-06 PROCEDURE — ? LIQUID NITROGEN

## 2018-06-06 PROCEDURE — 17000 DESTRUCT PREMALG LESION: CPT | Mod: 59

## 2018-06-06 PROCEDURE — 17003 DESTRUCT PREMALG LES 2-14: CPT

## 2018-06-06 PROCEDURE — 99213 OFFICE O/P EST LOW 20 MIN: CPT | Mod: 25

## 2018-06-06 PROCEDURE — ? COUNSELING

## 2018-06-06 ASSESSMENT — LOCATION ZONE DERM
LOCATION ZONE: ARM
LOCATION ZONE: LEG
LOCATION ZONE: FACE
LOCATION ZONE: TRUNK

## 2018-06-06 ASSESSMENT — LOCATION SIMPLE DESCRIPTION DERM
LOCATION SIMPLE: LEFT POSTERIOR UPPER ARM
LOCATION SIMPLE: INFERIOR FOREHEAD
LOCATION SIMPLE: UPPER BACK
LOCATION SIMPLE: RIGHT PRETIBIAL REGION
LOCATION SIMPLE: LEFT PRETIBIAL REGION
LOCATION SIMPLE: LEFT FOREHEAD
LOCATION SIMPLE: RIGHT FOREHEAD

## 2018-06-06 ASSESSMENT — LOCATION DETAILED DESCRIPTION DERM
LOCATION DETAILED: LEFT INFERIOR FOREHEAD
LOCATION DETAILED: LEFT PROXIMAL PRETIBIAL REGION
LOCATION DETAILED: RIGHT DISTAL PRETIBIAL REGION
LOCATION DETAILED: LEFT PROXIMAL LATERAL POSTERIOR UPPER ARM
LOCATION DETAILED: LEFT PROXIMAL POSTERIOR UPPER ARM
LOCATION DETAILED: RIGHT MEDIAL FOREHEAD
LOCATION DETAILED: INFERIOR THORACIC SPINE
LOCATION DETAILED: INFERIOR MID FOREHEAD
LOCATION DETAILED: SUPERIOR THORACIC SPINE

## 2018-06-06 NOTE — PROCEDURE: LIQUID NITROGEN
Post-Care Instructions: I reviewed with the patient in detail post-care instructions. Patient is to wear sunprotection, and avoid picking at any of the treated lesions. Pt may apply Vaseline to crusted or scabbing areas.
Detail Level: Detailed
Number Of Freeze-Thaw Cycles: 1 freeze-thaw cycle
Duration Of Freeze Thaw-Cycle (Seconds): 5
Render Post-Care Instructions In Note?: no
Consent: The patient's consent was obtained including but not limited to risks of crusting, scabbing, blistering, scarring, darker or lighter pigmentary change, recurrence, incomplete removal and infection.
Medical Necessity Clause: This procedure was medically necessary because the lesions that were treated were:
Medical Necessity Information: It is in your best interest to select a reason for this procedure from the list below. All of these items fulfill various CMS LCD requirements except the new and changing color options.

## 2018-06-08 ENCOUNTER — OFFICE VISIT (OUTPATIENT)
Dept: CARDIOLOGY | Facility: MEDICAL CENTER | Age: 83
End: 2018-06-08
Payer: MEDICARE

## 2018-06-08 VITALS
HEIGHT: 64 IN | HEART RATE: 68 BPM | RESPIRATION RATE: 1 BRPM | OXYGEN SATURATION: 94 % | BODY MASS INDEX: 23.9 KG/M2 | WEIGHT: 140 LBS | SYSTOLIC BLOOD PRESSURE: 130 MMHG | DIASTOLIC BLOOD PRESSURE: 70 MMHG

## 2018-06-08 DIAGNOSIS — Z79.01 CHRONIC ANTICOAGULATION: ICD-10-CM

## 2018-06-08 DIAGNOSIS — I49.5 SICK SINUS SYNDROME (HCC): Primary | ICD-10-CM

## 2018-06-08 DIAGNOSIS — Z45.09 ENCOUNTER FOR LOOP RECORDER CHECK: ICD-10-CM

## 2018-06-08 DIAGNOSIS — R42 DIZZINESS: ICD-10-CM

## 2018-06-08 DIAGNOSIS — I63.40 CEREBROVASCULAR ACCIDENT (CVA) DUE TO EMBOLISM OF CEREBRAL ARTERY (HCC): ICD-10-CM

## 2018-06-08 DIAGNOSIS — G47.33 OBSTRUCTIVE SLEEP APNEA SYNDROME: ICD-10-CM

## 2018-06-08 DIAGNOSIS — I48.0 PAROXYSMAL ATRIAL FIBRILLATION (HCC): ICD-10-CM

## 2018-06-08 PROCEDURE — 99214 OFFICE O/P EST MOD 30 MIN: CPT | Mod: 25 | Performed by: INTERNAL MEDICINE

## 2018-06-08 PROCEDURE — 93291 INTERROG DEV EVAL SCRMS IP: CPT | Performed by: INTERNAL MEDICINE

## 2018-06-08 NOTE — PROGRESS NOTES
"Arrhythmia Clinic Note (Established patient)    DOS: 6/8/2018    Chief complaint/Reason for consult: F/u ILR    Interval History:  Patient is a 89 yo F with history of prior CVA and s/p ILR with PAF. She is on Eliquis. Here for routine f/u. Doing well other than periodic episodes of dizziness which she describes as a lightheadedness.    ROS (+ highlighted in red):  Constitutional: Fevers/chills/fatigue/weightloss  Respiratory: Shortness of breath/cough  Cardiovascular: Chest pain/palpitations/edema/orthopnea/syncope    Past Medical History:   Diagnosis Date   • Arthritis    • Bronchitis    • Bruxism    • CATARACT    • Chickenpox    • COPD (chronic obstructive pulmonary disease) (HCC)    • Fibromyalgia    • [place] measles    • Hyperlipidemia    • Pericarditis 1960s   • Pneumonia    • Restless leg syndrome    • Sleep apnea     stopped cpap due to sinus problems   • Snoring     sleep study done   • Unspecified disorder of thyroid        Allergies   Allergen Reactions   • Nkda [No Known Drug Allergy]    • Other Environmental      Smoke, perfume       Current Outpatient Prescriptions   Medication Sig Dispense Refill   • apixaban (ELIQUIS) 2.5mg Tab Take 1 Tab by mouth 2 Times a Day. 180 Tab 2   • levothyroxine (SYNTHROID) 75 MCG Tab Take 1 Tab by mouth Every morning on an empty stomach. 90 Tab 3   • Cholecalciferol (VITAMIN D3) 2000 UNITS Tab Take 4,000 Units by mouth every day.     • BIOTIN PO Take  by mouth.     • cyanocobalamin (VITAMIN B-12) 1000 MCG/ML Solution INJECT 1 ML INTRAMUSCULARLY EVERY MONTH AS DIRECTED 1 Vial 11   • Coenzyme Q10 (CO Q 10 PO) Take  by mouth.     • Non Formulary Request Take 2 Tabs by mouth 2 Times a Day. \"Chaniflow Chinese drug for constipation\"       No current facility-administered medications for this visit.        Physical Exam:  Vitals:    06/08/18 0849   BP: 130/70   Pulse: 68   Resp: (!) 1   SpO2: 94%   Weight: 63.5 kg (140 lb)   Height: 1.626 m (5' 4\")     General appearance: NAD, " conversant   Eyes: anicteric sclerae, moist conjunctivae; no lid-lag; PERRLA  HENT: Atraumatic; oropharynx clear with moist mucous membranes and no mucosal ulcerations; normal hard and soft palate  Neck: Trachea midline; FROM, supple, no thyromegaly or lymphadenopathy  Lungs: CTA, with normal respiratory effort and no intercostal retractions  CV: RRR, no MRGs, no JVD  Abdomen: Soft, non-tender; no masses or HSM  Extremities: No peripheral edema or extremity lymphadenopathy  Skin: Normal temperature, turgor and texture; no rash, ulcers or subcutaneous nodules  Psych: Appropriate affect, alert and oriented to person, place and time    Data:  Labs reviewed    Prior echo/stress reviewed:  LVEF 60%    EKG interpreted by me:  NSR    Impression/Plan:  1. Sick sinus syndrome (HCC)     2. Obstructive sleep apnea syndrome     3. Cerebrovascular accident (CVA) due to embolism of cerebral artery (HCC)     4. Dizziness     5. Paroxysmal atrial fibrillation (HCC)     6. Chronic anticoagulation     7. Encounter for loop recorder check       -Her symptoms of dizziness may be related to slow sinus rates that she has on her ILR  -Most of these during sleeping hours, but a few of these, lasting minutes with HRs in low 30s are during waking hours, look to be sinus barrington  -We discussed PPM and how this may relieve some of her symptoms  -She declined today, citing her advanced age  -I will write her another 30 day sample of Eliquis  -F/u in 6 mo    Kaylee Valerio MD

## 2018-06-25 ENCOUNTER — TELEPHONE (OUTPATIENT)
Dept: CARDIOLOGY | Facility: MEDICAL CENTER | Age: 83
End: 2018-06-25

## 2018-06-25 NOTE — TELEPHONE ENCOUNTER
----- Message from Kavya Tejada sent at 6/25/2018  3:05 PM PDT -----  Regarding: samples of Eliquis  SS/Silvia      Patient needs samples of Eliquis called into [facility]. She has 3 days left of her current prescription. She can be reached at [phone].

## 2018-07-16 ENCOUNTER — APPOINTMENT (OUTPATIENT)
Dept: RADIOLOGY | Facility: MEDICAL CENTER | Age: 83
End: 2018-07-16
Attending: EMERGENCY MEDICINE
Payer: MEDICARE

## 2018-07-16 ENCOUNTER — HOSPITAL ENCOUNTER (OUTPATIENT)
Facility: MEDICAL CENTER | Age: 83
End: 2018-07-17
Attending: EMERGENCY MEDICINE | Admitting: FAMILY MEDICINE
Payer: MEDICARE

## 2018-07-16 DIAGNOSIS — M25.571 ACUTE RIGHT ANKLE PAIN: ICD-10-CM

## 2018-07-16 DIAGNOSIS — S82.831A CLOSED FRACTURE OF DISTAL END OF RIGHT FIBULA, UNSPECIFIED FRACTURE MORPHOLOGY, INITIAL ENCOUNTER: ICD-10-CM

## 2018-07-16 DIAGNOSIS — R42 DIZZINESS: ICD-10-CM

## 2018-07-16 DIAGNOSIS — I48.0 PAROXYSMAL ATRIAL FIBRILLATION (HCC): ICD-10-CM

## 2018-07-16 PROBLEM — S82.401A CLOSED RIGHT FIBULAR FRACTURE: Status: ACTIVE | Noted: 2018-07-16

## 2018-07-16 PROBLEM — Z86.73 HISTORY OF CVA (CEREBROVASCULAR ACCIDENT): Status: ACTIVE | Noted: 2018-07-16

## 2018-07-16 PROBLEM — I24.89 DEMAND ISCHEMIA (HCC): Status: ACTIVE | Noted: 2018-07-16

## 2018-07-16 LAB
ALBUMIN SERPL BCP-MCNC: 3.9 G/DL (ref 3.2–4.9)
ALBUMIN/GLOB SERPL: 1.2 G/DL
ALP SERPL-CCNC: 49 U/L (ref 30–99)
ALT SERPL-CCNC: 9 U/L (ref 2–50)
ANION GAP SERPL CALC-SCNC: 13 MMOL/L (ref 0–11.9)
APPEARANCE UR: ABNORMAL
APTT PPP: 23.1 SEC (ref 24.7–36)
AST SERPL-CCNC: 22 U/L (ref 12–45)
BACTERIA #/AREA URNS HPF: ABNORMAL /HPF
BASOPHILS # BLD AUTO: 0.2 % (ref 0–1.8)
BASOPHILS # BLD: 0.01 K/UL (ref 0–0.12)
BILIRUB SERPL-MCNC: 0.5 MG/DL (ref 0.1–1.5)
BILIRUB UR QL STRIP.AUTO: NEGATIVE
BUN SERPL-MCNC: 16 MG/DL (ref 8–22)
CALCIUM SERPL-MCNC: 8.7 MG/DL (ref 8.5–10.5)
CHLORIDE SERPL-SCNC: 108 MMOL/L (ref 96–112)
CO2 SERPL-SCNC: 21 MMOL/L (ref 20–33)
COLOR UR: YELLOW
CREAT SERPL-MCNC: 0.78 MG/DL (ref 0.5–1.4)
EKG IMPRESSION: NORMAL
EOSINOPHIL # BLD AUTO: 0 K/UL (ref 0–0.51)
EOSINOPHIL NFR BLD: 0 % (ref 0–6.9)
EPI CELLS #/AREA URNS HPF: ABNORMAL /HPF
ERYTHROCYTE [DISTWIDTH] IN BLOOD BY AUTOMATED COUNT: 46.7 FL (ref 35.9–50)
GLOBULIN SER CALC-MCNC: 3.2 G/DL (ref 1.9–3.5)
GLUCOSE SERPL-MCNC: 112 MG/DL (ref 65–99)
GLUCOSE UR STRIP.AUTO-MCNC: NEGATIVE MG/DL
HCT VFR BLD AUTO: 38.3 % (ref 37–47)
HGB BLD-MCNC: 12.6 G/DL (ref 12–16)
HYALINE CASTS #/AREA URNS LPF: ABNORMAL /LPF
IMM GRANULOCYTES # BLD AUTO: 0.04 K/UL (ref 0–0.11)
IMM GRANULOCYTES NFR BLD AUTO: 0.7 % (ref 0–0.9)
INR PPP: 1.03 (ref 0.87–1.13)
KETONES UR STRIP.AUTO-MCNC: ABNORMAL MG/DL
LEUKOCYTE ESTERASE UR QL STRIP.AUTO: ABNORMAL
LYMPHOCYTES # BLD AUTO: 1.22 K/UL (ref 1–4.8)
LYMPHOCYTES NFR BLD: 20.3 % (ref 22–41)
MCH RBC QN AUTO: 31 PG (ref 27–33)
MCHC RBC AUTO-ENTMCNC: 32.9 G/DL (ref 33.6–35)
MCV RBC AUTO: 94.1 FL (ref 81.4–97.8)
MICRO URNS: ABNORMAL
MONOCYTES # BLD AUTO: 0.33 K/UL (ref 0–0.85)
MONOCYTES NFR BLD AUTO: 5.5 % (ref 0–13.4)
MUCOUS THREADS #/AREA URNS HPF: ABNORMAL /HPF
NEUTROPHILS # BLD AUTO: 4.4 K/UL (ref 2–7.15)
NEUTROPHILS NFR BLD: 73.3 % (ref 44–72)
NITRITE UR QL STRIP.AUTO: NEGATIVE
NRBC # BLD AUTO: 0 K/UL
NRBC BLD-RTO: 0 /100 WBC
PH UR STRIP.AUTO: 6 [PH]
PLATELET # BLD AUTO: 116 K/UL (ref 164–446)
PMV BLD AUTO: 11.4 FL (ref 9–12.9)
POTASSIUM SERPL-SCNC: 5 MMOL/L (ref 3.6–5.5)
PROT SERPL-MCNC: 7.1 G/DL (ref 6–8.2)
PROT UR QL STRIP: NEGATIVE MG/DL
PROTHROMBIN TIME: 13.2 SEC (ref 12–14.6)
RBC # BLD AUTO: 4.07 M/UL (ref 4.2–5.4)
RBC # URNS HPF: ABNORMAL /HPF
RBC UR QL AUTO: NEGATIVE
SODIUM SERPL-SCNC: 142 MMOL/L (ref 135–145)
SP GR UR STRIP.AUTO: 1.02
TROPONIN I SERPL-MCNC: 0.03 NG/ML (ref 0–0.04)
TROPONIN I SERPL-MCNC: 0.04 NG/ML (ref 0–0.04)
TROPONIN I SERPL-MCNC: 0.04 NG/ML (ref 0–0.04)
TSH SERPL DL<=0.005 MIU/L-ACNC: 1.84 UIU/ML (ref 0.38–5.33)
UROBILINOGEN UR STRIP.AUTO-MCNC: 0.2 MG/DL
WBC # BLD AUTO: 6 K/UL (ref 4.8–10.8)
WBC #/AREA URNS HPF: ABNORMAL /HPF

## 2018-07-16 PROCEDURE — 93005 ELECTROCARDIOGRAM TRACING: CPT

## 2018-07-16 PROCEDURE — 93005 ELECTROCARDIOGRAM TRACING: CPT | Performed by: EMERGENCY MEDICINE

## 2018-07-16 PROCEDURE — 84484 ASSAY OF TROPONIN QUANT: CPT | Mod: 91

## 2018-07-16 PROCEDURE — 700102 HCHG RX REV CODE 250 W/ 637 OVERRIDE(OP): Performed by: INTERNAL MEDICINE

## 2018-07-16 PROCEDURE — 99220 PR INITIAL OBSERVATION CARE,LEVL III: CPT | Performed by: HOSPITALIST

## 2018-07-16 PROCEDURE — 99285 EMERGENCY DEPT VISIT HI MDM: CPT

## 2018-07-16 PROCEDURE — 87086 URINE CULTURE/COLONY COUNT: CPT

## 2018-07-16 PROCEDURE — A9270 NON-COVERED ITEM OR SERVICE: HCPCS | Performed by: STUDENT IN AN ORGANIZED HEALTH CARE EDUCATION/TRAINING PROGRAM

## 2018-07-16 PROCEDURE — G0378 HOSPITAL OBSERVATION PER HR: HCPCS

## 2018-07-16 PROCEDURE — 81001 URINALYSIS AUTO W/SCOPE: CPT

## 2018-07-16 PROCEDURE — 84443 ASSAY THYROID STIM HORMONE: CPT

## 2018-07-16 PROCEDURE — 36415 COLL VENOUS BLD VENIPUNCTURE: CPT

## 2018-07-16 PROCEDURE — 70450 CT HEAD/BRAIN W/O DYE: CPT

## 2018-07-16 PROCEDURE — 93306 TTE W/DOPPLER COMPLETE: CPT | Mod: 26 | Performed by: INTERNAL MEDICINE

## 2018-07-16 PROCEDURE — 700102 HCHG RX REV CODE 250 W/ 637 OVERRIDE(OP): Performed by: STUDENT IN AN ORGANIZED HEALTH CARE EDUCATION/TRAINING PROGRAM

## 2018-07-16 PROCEDURE — 85730 THROMBOPLASTIN TIME PARTIAL: CPT

## 2018-07-16 PROCEDURE — 73610 X-RAY EXAM OF ANKLE: CPT | Mod: RT

## 2018-07-16 PROCEDURE — 302875 HCHG BANDAGE ACE 4 OR 6""

## 2018-07-16 PROCEDURE — 71046 X-RAY EXAM CHEST 2 VIEWS: CPT

## 2018-07-16 PROCEDURE — 85610 PROTHROMBIN TIME: CPT

## 2018-07-16 PROCEDURE — A9270 NON-COVERED ITEM OR SERVICE: HCPCS | Performed by: INTERNAL MEDICINE

## 2018-07-16 PROCEDURE — 80053 COMPREHEN METABOLIC PANEL: CPT

## 2018-07-16 PROCEDURE — 29515 APPLICATION SHORT LEG SPLINT: CPT

## 2018-07-16 PROCEDURE — 85025 COMPLETE CBC W/AUTO DIFF WBC: CPT

## 2018-07-16 RX ORDER — LEVOTHYROXINE SODIUM 0.15 MG/1
150 TABLET ORAL
Status: DISCONTINUED | OUTPATIENT
Start: 2018-07-16 | End: 2018-07-17 | Stop reason: HOSPADM

## 2018-07-16 RX ORDER — ACETAMINOPHEN 325 MG/1
650 TABLET ORAL EVERY 6 HOURS PRN
Status: DISCONTINUED | OUTPATIENT
Start: 2018-07-16 | End: 2018-07-17 | Stop reason: HOSPADM

## 2018-07-16 RX ORDER — POLYETHYLENE GLYCOL 3350 17 G/17G
1 POWDER, FOR SOLUTION ORAL
Status: DISCONTINUED | OUTPATIENT
Start: 2018-07-16 | End: 2018-07-17 | Stop reason: HOSPADM

## 2018-07-16 RX ORDER — AMOXICILLIN 250 MG
2 CAPSULE ORAL 2 TIMES DAILY
Status: DISCONTINUED | OUTPATIENT
Start: 2018-07-16 | End: 2018-07-17 | Stop reason: HOSPADM

## 2018-07-16 RX ORDER — CHOLECALCIFEROL (VITAMIN D3) 125 MCG
1000 CAPSULE ORAL DAILY
Status: DISCONTINUED | OUTPATIENT
Start: 2018-07-17 | End: 2018-07-17 | Stop reason: HOSPADM

## 2018-07-16 RX ORDER — BISACODYL 10 MG
10 SUPPOSITORY, RECTAL RECTAL
Status: DISCONTINUED | OUTPATIENT
Start: 2018-07-16 | End: 2018-07-17 | Stop reason: HOSPADM

## 2018-07-16 RX ORDER — LEVOTHYROXINE SODIUM 0.15 MG/1
150 TABLET ORAL
COMMUNITY
End: 2018-10-05 | Stop reason: SDUPTHER

## 2018-07-16 RX ADMIN — LEVOTHYROXINE SODIUM 150 MCG: 150 TABLET ORAL at 19:31

## 2018-07-16 RX ADMIN — APIXABAN 2.5 MG: 2.5 TABLET, FILM COATED ORAL at 19:31

## 2018-07-16 RX ADMIN — STANDARDIZED SENNA CONCENTRATE AND DOCUSATE SODIUM 2 TABLET: 8.6; 5 TABLET, FILM COATED ORAL at 19:31

## 2018-07-16 ASSESSMENT — ENCOUNTER SYMPTOMS
DIARRHEA: 0
EYE PAIN: 0
COUGH: 0
BLURRED VISION: 0
DOUBLE VISION: 0
WEIGHT LOSS: 0
MYALGIAS: 0
DOUBLE VISION: 1
HEMOPTYSIS: 0
SHORTNESS OF BREATH: 0
BRUISES/BLEEDS EASILY: 0
LOSS OF CONSCIOUSNESS: 0
PHOTOPHOBIA: 0
FALLS: 1
BLURRED VISION: 1
ABDOMINAL PAIN: 0
HEARTBURN: 0
HEADACHES: 1
FEVER: 0
BLOOD IN STOOL: 0
BACK PAIN: 0
SPEECH CHANGE: 0
PALPITATIONS: 0
NAUSEA: 0
TINGLING: 0
DIAPHORESIS: 0
DEPRESSION: 0
VOMITING: 0
NAUSEA: 1
DIZZINESS: 1
NERVOUS/ANXIOUS: 0
FOCAL WEAKNESS: 0
SENSORY CHANGE: 0
CHILLS: 0

## 2018-07-16 ASSESSMENT — PAIN SCALES - GENERAL
PAINLEVEL_OUTOF10: 0
PAINLEVEL_OUTOF10: 9

## 2018-07-16 ASSESSMENT — PATIENT HEALTH QUESTIONNAIRE - PHQ9
2. FEELING DOWN, DEPRESSED, IRRITABLE, OR HOPELESS: NOT AT ALL
1. LITTLE INTEREST OR PLEASURE IN DOING THINGS: NOT AT ALL
SUM OF ALL RESPONSES TO PHQ9 QUESTIONS 1 AND 2: 0

## 2018-07-16 ASSESSMENT — COPD QUESTIONNAIRES
COPD SCREENING SCORE: 4
DO YOU EVER COUGH UP ANY MUCUS OR PHLEGM?: NO/ONLY WITH OCCASIONAL COLDS OR INFECTIONS
DURING THE PAST 4 WEEKS HOW MUCH DID YOU FEEL SHORT OF BREATH: NONE/LITTLE OF THE TIME
HAVE YOU SMOKED AT LEAST 100 CIGARETTES IN YOUR ENTIRE LIFE: YES
IN THE PAST 12 MONTHS DO YOU DO LESS THAN YOU USED TO BECAUSE OF YOUR BREATHING PROBLEMS: DISAGREE/UNSURE

## 2018-07-16 ASSESSMENT — LIFESTYLE VARIABLES
EVER_SMOKED: YES
ALCOHOL_USE: NO
SUBSTANCE_ABUSE: 0

## 2018-07-16 NOTE — ED NOTES
Pt given ice chips per okay from ERP. FOB elevated. Call light in reach. Lights dimmed for comfort. No other needs expressed.

## 2018-07-16 NOTE — ED NOTES
[facility] Fall Risk assessment complete, yellow socks/wristband/sign in place, bed locked and in lowest position, siderails x 2 up, call light and personal belongings within reach, family at bedside.

## 2018-07-16 NOTE — ED NOTES
Dominga Yoo called with update from pts CVT. Fredo reports pt had an episode of A-fib yesterday around 2300. Pt has a hx of the same. Last episode prior to this one was 6/30/2018. Fredo reports CVT was placed due to pts hx of stroke.

## 2018-07-16 NOTE — ED NOTES
Med rec updated and complete  Allergies reviewed  Pt reports no antibiotics in the last 30 days.  Pt reports that she is hit and miss on her vitamins.  She sometimes takes 2000 units or 8000 units of VITAMIN D3.

## 2018-07-16 NOTE — ED NOTES
Splint placed with help from ED tech. Pt tolerated well. FOB elevated. Pending admit. Pt aware. Repeat trop sent.

## 2018-07-16 NOTE — H&P
Internal Medicine Admitting History and Physical    Note Author: Roshan Narvaez D.O.       Name Afshan Love     1927   Age/Sex 89 y.o. female   MRN [id number]   Code Status FULL     After 5PM or if no immediate response to page, please call for cross-coverage  Attending/Team: Tad/Km See Patient List for primary contact information  Call [phone] to page    1st Call - Day Intern (R1):   Dr. Narvaez 2nd Call - Day Sr. Resident (R2/R3):   Dr. Patrick       Chief Complaint:   Dizziness  Fall    HPI:  The patient is a 89 y/o female with a PMH of CVA, HTN, HLD, Hypothyroidism, and Obstructive Sleep Apnea who is presenting following an episode of dizziness and a fall. The patient states that she has had a headache for the past two weeks in the front of her head. She states that the dizziness started right before she fell. The patient was attempting to walk into the shower when she lost her balance. Denies hitting her head. Denies loss of consciousness. The patient states that her vision briefly became blurry and double. Denies any photophobia or eye pain. The patient states that she then crawled into her bedroom and hit her right leg on her metal furniture. The patient denies any associated chest pain, shortness of breath, or diaphoresis. The patient states that she did not have any other focal deficits at the time of her fall.     In the ED, the patient had a CT Head which was negative for any acute abnormalities. She had an ECG which showed T-wave inversions in V5 and V6  But no ST elevations. Initial troponin was 0.04. The second troponin was 0.03. The patient's last ECHO was in 2016 which showed EF of 60%.     Review of Systems   Constitutional: Negative for chills, diaphoresis, fever, malaise/fatigue and weight loss.   HENT: Negative for congestion, hearing loss and tinnitus.    Eyes: Positive for blurred vision and double vision. Negative for photophobia and pain.    Respiratory: Negative for cough and hemoptysis.    Cardiovascular: Negative for chest pain, palpitations and leg swelling.   Gastrointestinal: Negative for abdominal pain, heartburn, nausea and vomiting.   Genitourinary: Positive for frequency. Negative for dysuria and hematuria.   Musculoskeletal: Positive for falls. Negative for myalgias.   Skin: Negative for itching and rash.   Neurological: Positive for dizziness and headaches.        Front of forehead headache that has been intermittent for the past two weeks.   Endo/Heme/Allergies: Negative for environmental allergies. Does not bruise/bleed easily.   Psychiatric/Behavioral: Negative for depression. The patient is not nervous/anxious.              Past Medical History (Chronic medical problem, known complications and current treatment)    CVA  Hypertension, controlled. Does not take medication.  Hyperlipidemia, No known home medications.  Hypothyroidism, Patient takes synthroid 150 mcg  Obstructive Sleep Apnea, patient does not wear a CPAP at night.    Past Surgical History:  Past Surgical History:   Procedure Laterality Date   • CATARACT PHACO WITH IOL  12/11/2013    Performed by Oma Dockery M.D. at SURGERY SAME DAY [facility] ORS   • FEMUR ORIF  5/22/2013    Performed by Johan Tsai M.D. at SURGERY [facility]S   • APPENDECTOMY     • HIP REPLACEMENT, TOTAL     • OTHER      eyelids fixed drooped below pupil   • PB REMV 2ND CATARACT,CORN-SCLER SECTN     • THYROIDECTOMY     • TONSILLECTOMY AND ADENOIDECTOMY         Current Outpatient Medications:  Home Medications     Reviewed by Donovan Escobedo (Pharmacy Tech) on 07/16/18 at 1341  Med List Status: Complete   Medication Last Dose Status   apixaban (ELIQUIS) 2.5mg Tab 7/15/2018 Active   BIOTIN PO > 3 days Active   Cholecalciferol (VITAMIN D3) 2000 UNITS Tab > 3 days Active   Coenzyme Q10 (CO Q 10 PO) > 1 week Active   Cyanocobalamin (B-12 PO) > 3 days Active   levothyroxine (SYNTHROID) 150 MCG Tab  "7/15/2018 Active                Medication Allergy/Sensitivities:  Allergies   Allergen Reactions   • Other Environmental Shortness of Breath     Smoke, perfume         Family History (mandatory)   Family History   Problem Relation Age of Onset   • Cancer Sister    • Psychiatry Father      suicide   • Heart Disease Son      extra heart beat   • Sleep Apnea Neg Hx        Social History (mandatory)   Social History     Social History   • Marital status:      Spouse name: N/A   • Number of children: N/A   • Years of education: N/A     Occupational History   • Not on file.     Social History Main Topics   • Smoking status: Former Smoker     Packs/day: 1.00     Years: 20.00     Types: Cigarettes     Quit date: 5/21/1961   • Smokeless tobacco: Never Used   • Alcohol use Yes      Comment: 1 PER MO   • Drug use: No      Comment: had 1/2 of Edible marijuana cookie once   • Sexual activity: Not Currently     Other Topics Concern   • Not on file     Social History Narrative   • No narrative on file     Living situation: Lives in a small apartment.  PCP : Caitlin Rojas D.O.    Physical Exam     Vitals:    07/16/18 1346 07/16/18 1400 07/16/18 1415 07/16/18 1431   BP:       Pulse: 76 70 67 76   Resp: (!) 25 20 20    Temp:       SpO2: 97% 97% 96% 97%   Weight:       Height:         Body mass index is 24.03 kg/m².  /62   Pulse 76   Temp 37.1 °C (98.8 °F)   Resp 20   Ht 1.626 m (5' 4\")   Wt 63.5 kg (140 lb)   SpO2 97%   BMI 24.03 kg/m²   O2 therapy: Pulse Oximetry: 97 %, O2 Delivery: None (Room Air)    Physical Exam   Constitutional: She is oriented to person, place, and time and well-developed, well-nourished, and in no distress. No distress.   HENT:   Head: Normocephalic and atraumatic.   Eyes: Conjunctivae are normal. Pupils are equal, round, and reactive to light. Right eye exhibits no discharge. Left eye exhibits no discharge.   Neck: Neck supple. No JVD present. No tracheal deviation present. "   Cardiovascular: Normal rate, regular rhythm and normal heart sounds.  Exam reveals no gallop and no friction rub.    No murmur heard.  Pulmonary/Chest: Effort normal and breath sounds normal. No respiratory distress. She has no wheezes.   Abdominal: Soft. Bowel sounds are normal. She exhibits no distension. There is no rebound and no guarding.   Mild tenderness to palpation in epigastrium     Musculoskeletal: She exhibits no edema or tenderness.   Neurological: She is alert and oriented to person, place, and time. No cranial nerve deficit. Coordination normal.   No focal neurologic deficits.  Strength 5/5 in upper and lower extremities.  Gait deferred.     Skin: Skin is dry. No rash noted. She is not diaphoretic. No erythema.   Psychiatric: Mood, affect and judgment normal.         Data Review       Old Records Request:   Completed  Current Records review/summary: Completed    Lab Data Review:  Recent Results (from the past 24 hour(s))   EKG (ER)    Collection Time: 18 11:09 AM   Result Value Ref Range    Report       [facility] Emergency Dept.    Test Date:  2018  Pt Name:    TIERNEY VASQUEZ                Department: ER  MRN:        [id number]                      Room:       [facility]  Gender:     Female                       Technician: [id number]  :        [id number]                   Requested By:ER TRIAGE PROTOCOL  Order #:    [id number]                    Reading MD:    Measurements  Intervals                                Axis  Rate:       79                           P:          55  MN:         180                          QRS:        -8  QRSD:       84                           T:          111  QT:         376  QTc:        432    Interpretive Statements  SINUS RHYTHM  PROBABLE INFERIOR INFARCT, AGE INDETERMINATE  LATERAL LEADS ARE ALSO INVOLVED  ARTIFACT IN LEAD(S) V2  Compared to ECG 2017 09:51:23  Myocardial infarct finding now present  T-wave abnormality no longer present      CBC WITH DIFFERENTIAL    Collection Time: 07/16/18 11:22 AM   Result Value Ref Range    WBC 6.0 4.8 - 10.8 K/uL    RBC 4.07 (L) 4.20 - 5.40 M/uL    Hemoglobin 12.6 12.0 - 16.0 g/dL    Hematocrit 38.3 37.0 - 47.0 %    MCV 94.1 81.4 - 97.8 fL    MCH 31.0 27.0 - 33.0 pg    MCHC 32.9 (L) 33.6 - 35.0 g/dL    RDW 46.7 35.9 - 50.0 fL    Platelet Count 116 (L) 164 - 446 K/uL    MPV 11.4 9.0 - 12.9 fL    Neutrophils-Polys 73.30 (H) 44.00 - 72.00 %    Lymphocytes 20.30 (L) 22.00 - 41.00 %    Monocytes 5.50 0.00 - 13.40 %    Eosinophils 0.00 0.00 - 6.90 %    Basophils 0.20 0.00 - 1.80 %    Immature Granulocytes 0.70 0.00 - 0.90 %    Nucleated RBC 0.00 /100 WBC    Neutrophils (Absolute) 4.40 2.00 - 7.15 K/uL    Lymphs (Absolute) 1.22 1.00 - 4.80 K/uL    Monos (Absolute) 0.33 0.00 - 0.85 K/uL    Eos (Absolute) 0.00 0.00 - 0.51 K/uL    Baso (Absolute) 0.01 0.00 - 0.12 K/uL    Immature Granulocytes (abs) 0.04 0.00 - 0.11 K/uL    NRBC (Absolute) 0.00 K/uL   COMP METABOLIC PANEL    Collection Time: 07/16/18 11:22 AM   Result Value Ref Range    Co2 21 20 - 33 mmol/L    Glucose 112 (H) 65 - 99 mg/dL    Bun 16 8 - 22 mg/dL    Creatinine 0.78 0.50 - 1.40 mg/dL    Calcium 8.7 8.5 - 10.5 mg/dL    AST(SGOT) 22 12 - 45 U/L    ALT(SGPT) 9 2 - 50 U/L    Alkaline Phosphatase 49 30 - 99 U/L    Total Bilirubin 0.5 0.1 - 1.5 mg/dL    Albumin 3.9 3.2 - 4.9 g/dL    Total Protein 7.1 6.0 - 8.2 g/dL    Globulin 3.2 1.9 - 3.5 g/dL    A-G Ratio 1.2 g/dL    Sodium 142 135 - 145 mmol/L    Potassium 5.0 3.6 - 5.5 mmol/L    Chloride 108 96 - 112 mmol/L    Anion Gap 13.0 (H) 0.0 - 11.9   TROPONIN    Collection Time: 07/16/18 11:22 AM   Result Value Ref Range    Troponin I 0.04 0.00 - 0.04 ng/mL   PROTHROMBIN TIME    Collection Time: 07/16/18 11:22 AM   Result Value Ref Range    PT 13.2 12.0 - 14.6 sec    INR 1.03 0.87 - 1.13   APTT    Collection Time: 07/16/18 11:22 AM   Result Value Ref Range    APTT 23.1 (L) 24.7 - 36.0 sec   TSH WITH REFLEX TO FT4     Collection Time: 18 11:22 AM   Result Value Ref Range    TSH 1.840 0.380 - 5.330 uIU/mL   ESTIMATED GFR    Collection Time: 18 11:22 AM   Result Value Ref Range    GFR If African American >60 >60 mL/min/1.73 m 2    GFR If Non African American >60 >60 mL/min/1.73 m 2   EKG (ER)    Collection Time: 18  1:44 PM   Result Value Ref Range    Report       [facility] Emergency Dept.    Test Date:  2018  Pt Name:    TIERNEY VASQUEZ                Department: ER  MRN:        [id number]                      Room:       [facility]  Gender:     Female                       Technician: [id number]  :        1927                   Requested By:JOSE ROBERTO JAIME  Order #:    [id number]                    Reading MD:    Measurements  Intervals                                Axis  Rate:       73                           P:          73  NJ:         180                          QRS:        -30  QRSD:       78                           T:          110  QT:         388  QTc:        428    Interpretive Statements  SINUS RHYTHM  LEFT AXIS DEVIATION  BORDERLINE LOW VOLTAGE IN FRONTAL LEADS  NONSPECIFIC T ABNORMALITIES, LATERAL LEADS  Compared to ECG 2018 11:09:24  Left-axis deviation now present  T-wave abnormality now present  Myocardial infarct finding no longer present     TROPONIN    Collection Time: 18  2:12 PM   Result Value Ref Range    Troponin I 0.04 0.00 - 0.04 ng/mL       Imaging/Procedures Review:    Independant Imaging Review: Completed  DX-ANKLE 3+ VIEWS RIGHT   Final Result      RIGHT distal fibular fracture      DX-CHEST-2 VIEWS   Final Result      No acute cardiopulmonary abnormality identified.      CT-HEAD W/O   Final Result      No acute intracranial abnormality             EKG:   EKG Independant Review: Completed  QTc:428, HR: 73, Normal Sinus Rhythm, Inverted T waves leads V5 and V6.    Records reviewed and summarized in current documentation :  Yes  [employer] teaching service  handout given to patient:  No         Assessment/Plan     * Fall- (present on admission)   Assessment & Plan    Patient presents with a mechanical fall. DDX includes TIA, especially given hx of CVA, also less likely dehydration. Patient shows no signs of prerenal symptoms.  Denies hitting her head or any loss of consciousness.  CT Head Negative.  Plan:  -Continue fall precautions  -Orthostatic vitals are pending.  -ECHO ordered to evaluate for any new valvular abnormalities.        History of CVA (cerebrovascular accident)- (present on admission)   Assessment & Plan    Patient has history of CVA.  Initial CT Head today negative for acute intracranial abnormalities.  Patient presented with fall today. DDX includes TIA.  Plan:  -Continue to monitor neurological status.        Closed right fibular fracture- (present on admission)   Assessment & Plan    Patient states that she hit her leg on her metal furniture when crawling to her room after her fall.  X-ray shows right fibular fracture.  Plans:  -Patient is anticoagulated on apixiban which she takes at home  -Continue fall precautions.        Demand ischemia (HCC)- (present on admission)   Assessment & Plan    Patient had elevated troponin of 0.04 which is trending down to 0.03.  Patient denies chest pain, with recent episode of dizziness, patient may have been dehydrated and troponin elevation may have been due to demand ischemia.  No ST elevations on ECG, though patient does have T wave inversions in V5 and V6 which were not apparent on previous ECG.  Plan:  -Following orthostatic vitals consider starting Normal Saline.        Dizziness   Assessment & Plan    The patient had one acute episode of dizziness prior to her fall.  CT head shows no acute abnormalities.  Patient had ECHO in 3/2016 which showed EF of 60%  Plan:  -Repeat Echocardiogram to evaluate for any new valvular abnormalities  -Continue fall precautions  -Orthostatic vitals        Hypertension- (present  on admission)   Assessment & Plan    Patient has documented history of hypertension, though does not take any blood pressure medications at home.  Plan:  -Monitor blood pressure   -Currently elevated 154/62.   -Consider starting lisinopril 5 mg.        Hypothyroidism- (present on admission)   Assessment & Plan    Patient has chronic history of hypothyroidism.  Plan:  -Continue synthroid 150 mcg            Anticipated Hospital stay: Observation admit        Quality Measures  Quality-Core Measures  PCP: YVONNE Joya,     The patient was seen by me face to face. I personally had a discussion with the patient. The case was discussed with our resident team, I examined the patient and confirmed the essential components of the history, physical examination, diagnosis and treatment plan as needed. I agree with the patient care as documented by the resident and edited as above by me. See resident's note above for complete details of service. The overall treatment regimen will be carried out as described above.     Thank you:  Mohit Mishra MD, FACP  [facility]  Pager: Use Tiger Text (use resident info under treatment team for day to day floor issues)  Office: [phone] Ext. [phone]  Fax: [phone] (non PHI only)

## 2018-07-16 NOTE — ED TRIAGE NOTES
".  Chief Complaint   Patient presents with   • Dizziness     \"off and on for maybe a month\"   • T-5000 FALL     Fell back onto toilet while getting into shower, (?) LOC, patient takes Eloquis   • Ankle Pain     Right, CMS intact     ./62   Pulse 81   Temp 37.1 °C (98.8 °F)   Resp 20   Ht 1.626 m (5' 4\")   Wt 63.5 kg (140 lb)   SpO2 97%   BMI 24.03 kg/m²     Ambulatory to triage with above complaints, also c/o bilateral foot pain \"so bad I can't stand on them\", patient denies chest pain, nauseaus this morning but denies at this time, denies vomiting.  VSS on RA.  EKG in process.    "

## 2018-07-16 NOTE — ED PROVIDER NOTES
"ED Provider Note    CHIEF COMPLAINT  Chief Complaint   Patient presents with   • Dizziness     \"off and on for maybe a month\"   • T-5000 FALL     Fell back onto toilet while getting into shower, (?) LOC, patient takes Eloquis   • Ankle Pain     Right, CMS intact       HPI  This is a 89-year-old female with a history of COPD, fibromyalgia, hypothyroidism, and dysrhythmia with current loop recorder in place, currently on apixaban.  She presents emergency department for intermittent dizziness over this past month.  Patient reports that at 6 AM this morning she was in her bathroom when she felt very dizzy while getting up to take a shower.  She states that she fell back onto the toilet, but did not fall entirely to the ground.  In the process she injured her right ankle, and states that it hurts so bad that she had to crawl on her hands and knees to get back to her room.  She reports that the dizziness did not subside for several minutes, and she subsequently called her son on the telephone.  She denies current dizziness, headache, weakness, or numbness.  She states that it does come and go when asked about the dizziness and headaches.  She denies any blurry vision.  Currently she has pain only located in the right ankle, which is severe, exacerbated by movement, and associated with swelling.        REVIEW OF SYSTEMS  Review of Systems   Constitutional: Negative for chills, fever and weight loss.        +decreased appetite   HENT: Negative for congestion, ear pain and nosebleeds.    Eyes: Negative for blurred vision and double vision.   Respiratory: Negative for cough and shortness of breath.    Cardiovascular: Negative for chest pain, palpitations and leg swelling.   Gastrointestinal: Positive for nausea. Negative for abdominal pain, blood in stool, diarrhea and vomiting.   Genitourinary: Positive for frequency. Negative for dysuria, hematuria and urgency.   Musculoskeletal: Positive for falls and joint pain (right " ankle). Negative for back pain.   Skin: Negative for rash.   Neurological: Positive for dizziness and headaches. Negative for tingling, sensory change, speech change, focal weakness and loss of consciousness.   Psychiatric/Behavioral: Negative for depression and substance abuse.       PAST MEDICAL HISTORY   has a past medical history of Arthritis; Bronchitis; Bruxism; CATARACT; Chickenpox; COPD (chronic obstructive pulmonary disease) (HCC); Fibromyalgia; [place] measles; Hyperlipidemia; Pericarditis (1960s); Pneumonia; Restless leg syndrome; Sleep apnea; Snoring; and Unspecified disorder of thyroid.    SOCIAL HISTORY  Social History     Social History Main Topics   • Smoking status: Former Smoker     Packs/day: 1.00     Years: 20.00     Types: Cigarettes     Quit date: 5/21/1961   • Smokeless tobacco: Never Used   • Alcohol use Yes      Comment: 1 PER MO   • Drug use: No      Comment: had 1/2 of Edible marijuana cookie once   • Sexual activity: Not Currently       SURGICAL HISTORY   has a past surgical history that includes appendectomy; thyroidectomy; other; tonsillectomy and adenoidectomy; femur orif (5/22/2013); cataract phaco with iol (12/11/2013); remv 2nd cataract,corn-scler sectn; and hip replacement, total.    CURRENT MEDICATIONS  Home Medications     Reviewed by Kavya Silvestre M.D. (Physician) on 07/16/18 at 1136  Med List Status: Partial   Medication Last Dose Status   apixaban (ELIQUIS) 2.5mg Tab 6/8/2018 Active   apixaban (ELIQUIS) 2.5mg Tab  Active   BIOTIN PO 6/8/2018 Active   Cholecalciferol (VITAMIN D3) 2000 UNITS Tab 6/8/2018 Active   Coenzyme Q10 (CO Q 10 PO) 4/18/2018 Active   cyanocobalamin (VITAMIN B-12) 1000 MCG/ML Solution 6/8/2018 Active   levothyroxine (SYNTHROID) 75 MCG Tab 6/8/2018 Active   Non Formulary Request Unknown Active                ALLERGIES  Allergies   Allergen Reactions   • Nkda [No Known Drug Allergy]    • Other Environmental      Smoke, perfume       PHYSICAL EXAM  VITAL  "SIGNS: /62   Pulse 78   Temp 37.1 °C (98.8 °F)   Resp 19   Ht 1.626 m (5' 4\")   Wt 63.5 kg (140 lb)   SpO2 96%   BMI 24.03 kg/m²    Pulse ox interpretation: I interpret this pulse ox as normal.    Physical Exam   Constitutional: She is oriented to person, place, and time and well-developed, well-nourished, and in no distress. No distress.   HENT:   Head: Normocephalic and atraumatic.   Mouth/Throat: Oropharynx is clear and moist.   Eyes: Conjunctivae and EOM are normal. Pupils are equal, round, and reactive to light.   Neck: Normal range of motion. Neck supple.   Cardiovascular: Normal rate, regular rhythm and intact distal pulses.    Pulmonary/Chest: Effort normal and breath sounds normal. No respiratory distress. She has no wheezes. She has no rales.   Loop recorder in place, left lower chest   Abdominal: Soft. Bowel sounds are normal. She exhibits no distension. There is no tenderness.   Well healed lower abdominal scar   Musculoskeletal: Normal range of motion.   Right ankle severely swollen and tender, sensation and pulse intact distal to the deformity   Neurological: She is alert and oriented to person, place, and time. She has normal sensation, normal strength and intact cranial nerves. She is not disoriented. She displays facial symmetry. She exhibits normal muscle tone. She has a normal Romberg Test. She shows no pronator drift. Coordination normal. GCS score is 15.   Skin: Skin is warm and dry. She is not diaphoretic.   Psychiatric: Affect and judgment normal.   Nursing note and vitals reviewed.            COURSE & MEDICAL DECISION MAKING  Pertinent Labs & Imaging studies reviewed. (See chart for details)    89-year-old female presents after an episode of dizziness caused her to lose her balance and fall in her bathroom this morning.  She has a grossly deformed right ankle and abrasions on her bilateral knees.  She otherwise was neurologically intact and denied chest pain or shortness of " breath.  Vitals were largely within normal limits.  Differential includes dysrhythmia, ACS, intracranial hemorrhage, right ankle fracture versus sprain, dehydration, electrolyte disturbance, pneumonia, UTI    12- Lead EKG; interpreted by ED Physician   Time: 11:09  Normal sinus rhythm with a rate of 79 bpm.   Normal axis.  Normal intervals.   No ST elevation or depression    No widening of QRS complex   Q waves inferior leads.    Clinical Impression: old MI, no STEMI    Labs are obtained and were remarkable for a mildly increased troponin at 0.04.  EKG was again reviewed and did not show any evidence of ST elevation myocardial infarction.  [facility] was consulted and came and evaluated the patient's loop recorder.  They reported that she had an episode of atrial fibrillation yesterday, which may be leading to the troponin leak.  Labs are otherwise largely unremarkable.  CT head was obtained showing no acute intracranial abnormality.  Chest x-ray showed no acute cardiopulmonary abnormality.  X-ray of the right ankle showed a distal fibula fracture.    Patient's case was discussed with orthopedic surgery who recommended posterior slab splint with stirrups.  This was placed by the  tech.    Patient's case was also discussed with Dr. Patrick who agreed to admit the patient.  Patient will be [facility] to [facility] internal medicine service for further evaluation of her dizziness.  Please see the admission, daily progress, and discharge notes for the ultimate disposition of this patient.  Patient was agreeable to admission and was in stable condition at the time of transfer.    FINAL IMPRESSION  Visit Diagnoses     ICD-10-CM   1. Dizziness R42   2. Acute right ankle pain M25.571   3. Closed fracture of distal end of right fibula, unspecified fracture morphology, initial encounter S82.831A   4. Paroxysmal atrial fibrillation (HCC) I48.0              Electronically signed by: Kavya Silvestre, 7/16/2018 11:57 AM

## 2018-07-16 NOTE — CONSULTS
7/16/2018    Reason for consultation: Right fibula fracture    Consultation on Afshan Mikaelasonny Loev at the request of Dr. Silvestre for a right nondisplaced fibula fracture.  The patient is a 89 y.o. female who presents with a right nondisplaced fibula fracture due to ground level fall caused by dizziness.  The patient noted immediate pain and inability to move the affected extremity due to pain.  They were evaluated in the ER, and Orthopedics was consulted. Patient denies numbness, paresthesias, loss of consciousness or other symptoms other than dizziness and ankle pain.    Past Medical History:   Diagnosis Date   • Arthritis    • Bronchitis    • Bruxism    • CATARACT    • Chickenpox    • COPD (chronic obstructive pulmonary disease) ([facility])    • Fibromyalgia    • [place] measles    • Hyperlipidemia    • Pericarditis 1960s   • Pneumonia    • Restless leg syndrome    • Sleep apnea     stopped cpap due to sinus problems   • Snoring     sleep study done   • Unspecified disorder of thyroid        Past Surgical History:   Procedure Laterality Date   • CATARACT PHACO WITH IOL  12/11/2013    Performed by Oma Dockery M.D. at SURGERY SAME DAY [facility] ORS   • FEMUR ORIF  5/22/2013    Performed by Johan Tsai M.D. at SURGERY [facility] ORS   • APPENDECTOMY     • HIP REPLACEMENT, TOTAL     • OTHER      eyelids fixed drooped below pupil   • PB REMV 2ND CATARACT,CORN-SCLER SECTN     • THYROIDECTOMY     • TONSILLECTOMY AND ADENOIDECTOMY         Medications  No current facility-administered medications on file prior to encounter.      Current Outpatient Prescriptions on File Prior to Encounter   Medication Sig Dispense Refill   • apixaban (ELIQUIS) 2.5mg Tab Take 1 Tab by mouth 2 Times a Day. 180 Tab 2   • Cholecalciferol (VITAMIN D3) 2000 UNITS Tab Take 4,000-8,000 Units by mouth every day.     • Coenzyme Q10 (CO Q 10 PO) Take 1 Cap by mouth every day.     • BIOTIN PO Take 1 Tab by mouth every day.         Allergies  Other  "environmental    ROS  Per HPI. All other systems were reviewed and found to be negative    Family History   Problem Relation Age of Onset   • Cancer Sister    • Psychiatry Father      suicide   • Heart Disease Son      extra heart beat   • Sleep Apnea Neg Hx        Social History     Social History   • Marital status:      Spouse name: N/A   • Number of children: N/A   • Years of education: N/A     Social History Main Topics   • Smoking status: Former Smoker     Packs/day: 1.00     Years: 20.00     Types: Cigarettes     Quit date: 5/21/1961   • Smokeless tobacco: Never Used   • Alcohol use Yes      Comment: 1 PER MO   • Drug use: No      Comment: had 1/2 of Edible marijuana cookie once   • Sexual activity: Not Currently     Other Topics Concern   • Not on file     Social History Narrative   • No narrative on file       Physical Exam  Vitals  Blood pressure 131/62, pulse 76, temperature 37.1 °C (98.8 °F), resp. rate 20, height 1.626 m (5' 4\"), weight 63.5 kg (140 lb), SpO2 97 %.  General: Well Developed, Well Nourished, no acute distress  Psychiatric: Alert and oriented x3, appropriate responses to questions, pleasant mood and affect.  HEENT: Normocephalic, atraumatic  Eyes: Anicteric, PERRLA, EOMI  Neck: Supple, nontender, no masses  Chest: Symmetric expansion of the chest wall, non-tender to palpation, no distress.  Heart: RRR, palpable peripheral pulses  Abdomen: Soft, NT, ND  Skin: Intact, no open wounds  Extremities: Tender to palpation lateral right ankle, minimally tender medial ankle  Neuro: Intact light touch sensation right foot, intact motors TA/GS/EHL/P  Vascular: 2+ DP, Capillary refill <2 seconds    Radiographs:  DX-ANKLE 3+ VIEWS RIGHT   Final Result      RIGHT distal fibular fracture      DX-CHEST-2 VIEWS   Final Result      No acute cardiopulmonary abnormality identified.      CT-HEAD W/O   Final Result      No acute intracranial abnormality          Laboratory Values  Recent Labs      " 07/16/18   1122   WBC  6.0   RBC  4.07*   HEMOGLOBIN  12.6   HEMATOCRIT  38.3   MCV  94.1   MCH  31.0   MCHC  32.9*   RDW  46.7   PLATELETCT  116*   MPV  11.4     Recent Labs      07/16/18   1122   SODIUM  142   POTASSIUM  5.0   CHLORIDE  108   CO2  21   GLUCOSE  112*   BUN  16     Recent Labs      07/16/18   1122   APTT  23.1*   INR  1.03         Impression:    #1 Right non-displaced fibula fracture    Plan:     Non-operative management.  Toe touch weightbearing for now.  The ER will place her in a well padded splint.  She can follow up at the [facility] in 1-2 weeks.

## 2018-07-16 NOTE — ED NOTES
[place] fall assessment complete as below.    Pt presents to ED because of a fall (syncope, seizure, or ALOC)? Yes  Is the patient 70 years of age or older? Yes  Does the patient have an altered mental status (intoxicated with alcohol or substance, confusion, inability to follow directions)? No  Does the patient have impaired mobility (ambulates or transfers with assistive devices or assist, ambulates with unsteady gait and no assistance, unable to ambulate or transfer)? No  Does the nurse feel the patient is a fall risk due to bowel or bladder incontinence, diarrhea, urinary frequency or urgency, leg weakness, orthostatic hypotension, dizziness or vertigo, narcotic use)? No    Pt is a fall risk. Interventions complete. Pt placed in a yellow non-slip socks, wrist band placed, green sign on door. Bed locked in low position, call light in place. Personal possessions in place or within reach. Personal needs assessed. Safety assessed. Monitoring ongoing.

## 2018-07-17 VITALS
RESPIRATION RATE: 16 BRPM | SYSTOLIC BLOOD PRESSURE: 157 MMHG | OXYGEN SATURATION: 95 % | WEIGHT: 140 LBS | HEART RATE: 77 BPM | DIASTOLIC BLOOD PRESSURE: 72 MMHG | TEMPERATURE: 97.4 F | BODY MASS INDEX: 23.9 KG/M2 | HEIGHT: 64 IN

## 2018-07-17 LAB
ANION GAP SERPL CALC-SCNC: 12 MMOL/L (ref 0–11.9)
BASOPHILS # BLD AUTO: 0 % (ref 0–1.8)
BASOPHILS # BLD: 0 K/UL (ref 0–0.12)
BUN SERPL-MCNC: 18 MG/DL (ref 8–22)
CALCIUM SERPL-MCNC: 7.8 MG/DL (ref 8.5–10.5)
CHLORIDE SERPL-SCNC: 110 MMOL/L (ref 96–112)
CO2 SERPL-SCNC: 17 MMOL/L (ref 20–33)
CREAT SERPL-MCNC: 0.75 MG/DL (ref 0.5–1.4)
EOSINOPHIL # BLD AUTO: 0 K/UL (ref 0–0.51)
EOSINOPHIL NFR BLD: 0 % (ref 0–6.9)
ERYTHROCYTE [DISTWIDTH] IN BLOOD BY AUTOMATED COUNT: 46.7 FL (ref 35.9–50)
GLUCOSE SERPL-MCNC: 96 MG/DL (ref 65–99)
HCT VFR BLD AUTO: 34 % (ref 37–47)
HGB BLD-MCNC: 11.1 G/DL (ref 12–16)
LV EJECT FRACT  99904: 55
LV EJECT FRACT MOD 2C 99903: 53.16
LV EJECT FRACT MOD 4C 99902: 59.46
LV EJECT FRACT MOD BP 99901: 54.59
LYMPHOCYTES # BLD AUTO: 0.4 K/UL (ref 1–4.8)
LYMPHOCYTES NFR BLD: 12.6 % (ref 22–41)
MANUAL DIFF BLD: NORMAL
MCH RBC QN AUTO: 30.9 PG (ref 27–33)
MCHC RBC AUTO-ENTMCNC: 32.6 G/DL (ref 33.6–35)
MCV RBC AUTO: 94.7 FL (ref 81.4–97.8)
MONOCYTES # BLD AUTO: 0.13 K/UL (ref 0–0.85)
MONOCYTES NFR BLD AUTO: 4.2 % (ref 0–13.4)
MORPHOLOGY BLD-IMP: NORMAL
NEUTROPHILS # BLD AUTO: 2.66 K/UL (ref 2–7.15)
NEUTROPHILS NFR BLD: 83.2 % (ref 44–72)
NRBC # BLD AUTO: 0 K/UL
NRBC BLD-RTO: 0 /100 WBC
PLATELET # BLD AUTO: 85 K/UL (ref 164–446)
PLATELET BLD QL SMEAR: NORMAL
PMV BLD AUTO: 11.1 FL (ref 9–12.9)
POTASSIUM SERPL-SCNC: 3.7 MMOL/L (ref 3.6–5.5)
RBC # BLD AUTO: 3.59 M/UL (ref 4.2–5.4)
RBC BLD AUTO: PRESENT
SODIUM SERPL-SCNC: 139 MMOL/L (ref 135–145)
TOXIC GRANULES BLD QL SMEAR: SLIGHT
WBC # BLD AUTO: 3.2 K/UL (ref 4.8–10.8)

## 2018-07-17 PROCEDURE — 97162 PT EVAL MOD COMPLEX 30 MIN: CPT

## 2018-07-17 PROCEDURE — 700102 HCHG RX REV CODE 250 W/ 637 OVERRIDE(OP): Performed by: INTERNAL MEDICINE

## 2018-07-17 PROCEDURE — G0378 HOSPITAL OBSERVATION PER HR: HCPCS

## 2018-07-17 PROCEDURE — 97166 OT EVAL MOD COMPLEX 45 MIN: CPT

## 2018-07-17 PROCEDURE — 80048 BASIC METABOLIC PNL TOTAL CA: CPT

## 2018-07-17 PROCEDURE — 85027 COMPLETE CBC AUTOMATED: CPT

## 2018-07-17 PROCEDURE — G8988 SELF CARE GOAL STATUS: HCPCS | Mod: CJ

## 2018-07-17 PROCEDURE — A9270 NON-COVERED ITEM OR SERVICE: HCPCS | Performed by: INTERNAL MEDICINE

## 2018-07-17 PROCEDURE — G8979 MOBILITY GOAL STATUS: HCPCS | Mod: CJ

## 2018-07-17 PROCEDURE — 93306 TTE W/DOPPLER COMPLETE: CPT

## 2018-07-17 PROCEDURE — 700102 HCHG RX REV CODE 250 W/ 637 OVERRIDE(OP): Performed by: STUDENT IN AN ORGANIZED HEALTH CARE EDUCATION/TRAINING PROGRAM

## 2018-07-17 PROCEDURE — A9270 NON-COVERED ITEM OR SERVICE: HCPCS | Performed by: STUDENT IN AN ORGANIZED HEALTH CARE EDUCATION/TRAINING PROGRAM

## 2018-07-17 PROCEDURE — 99217 PR OBSERVATION CARE DISCHARGE: CPT | Performed by: HOSPITALIST

## 2018-07-17 PROCEDURE — G8978 MOBILITY CURRENT STATUS: HCPCS | Mod: CL

## 2018-07-17 PROCEDURE — 85007 BL SMEAR W/DIFF WBC COUNT: CPT

## 2018-07-17 PROCEDURE — G8987 SELF CARE CURRENT STATUS: HCPCS | Mod: CK

## 2018-07-17 PROCEDURE — 93880 EXTRACRANIAL BILAT STUDY: CPT

## 2018-07-17 PROCEDURE — 36415 COLL VENOUS BLD VENIPUNCTURE: CPT

## 2018-07-17 RX ORDER — CHOLECALCIFEROL (VITAMIN D3) 125 MCG
CAPSULE ORAL
Status: DISCONTINUED
Start: 2018-07-17 | End: 2018-07-17 | Stop reason: HOSPADM

## 2018-07-17 RX ORDER — AMOXICILLIN 250 MG
CAPSULE ORAL
Status: DISCONTINUED
Start: 2018-07-17 | End: 2018-07-17 | Stop reason: HOSPADM

## 2018-07-17 RX ORDER — POTASSIUM CHLORIDE 20 MEQ/1
40 TABLET, EXTENDED RELEASE ORAL ONCE
Status: COMPLETED | OUTPATIENT
Start: 2018-07-17 | End: 2018-07-17

## 2018-07-17 RX ADMIN — CYANOCOBALAMIN TAB 500 MCG 1000 MCG: 500 TAB at 05:58

## 2018-07-17 RX ADMIN — APIXABAN 2.5 MG: 2.5 TABLET, FILM COATED ORAL at 05:58

## 2018-07-17 RX ADMIN — POTASSIUM CHLORIDE 40 MEQ: 1500 TABLET, EXTENDED RELEASE ORAL at 08:31

## 2018-07-17 RX ADMIN — Medication 30 MG: at 05:58

## 2018-07-17 RX ADMIN — STANDARDIZED SENNA CONCENTRATE AND DOCUSATE SODIUM 2 TABLET: 8.6; 5 TABLET, FILM COATED ORAL at 06:02

## 2018-07-17 RX ADMIN — VITAMIN D, TAB 1000IU (100/BT) 4000 UNITS: 25 TAB at 05:58

## 2018-07-17 RX ADMIN — LEVOTHYROXINE SODIUM 150 MCG: 150 TABLET ORAL at 06:00

## 2018-07-17 ASSESSMENT — COGNITIVE AND FUNCTIONAL STATUS - GENERAL
CLIMB 3 TO 5 STEPS WITH RAILING: TOTAL
TURNING FROM BACK TO SIDE WHILE IN FLAT BAD: A LITTLE
MOVING FROM LYING ON BACK TO SITTING ON SIDE OF FLAT BED: UNABLE
STANDING UP FROM CHAIR USING ARMS: A LOT
HELP NEEDED FOR BATHING: A LOT
PERSONAL GROOMING: A LITTLE
DAILY ACTIVITIY SCORE: 16
MOVING TO AND FROM BED TO CHAIR: A LITTLE
DRESSING REGULAR UPPER BODY CLOTHING: A LITTLE
MOBILITY SCORE: 11
TOILETING: A LOT
SUGGESTED CMS G CODE MODIFIER DAILY ACTIVITY: CK
DRESSING REGULAR LOWER BODY CLOTHING: A LOT
SUGGESTED CMS G CODE MODIFIER MOBILITY: CL
WALKING IN HOSPITAL ROOM: TOTAL

## 2018-07-17 ASSESSMENT — PAIN SCALES - GENERAL
PAINLEVEL_OUTOF10: 0
PAINLEVEL_OUTOF10: 0

## 2018-07-17 ASSESSMENT — ENCOUNTER SYMPTOMS
DOUBLE VISION: 0
FEVER: 0
DEPRESSION: 0
BRUISES/BLEEDS EASILY: 0
CHILLS: 0
PALPITATIONS: 0
FALLS: 0
BLURRED VISION: 0
SHORTNESS OF BREATH: 0
NERVOUS/ANXIOUS: 0
DIZZINESS: 1
HEADACHES: 0
MYALGIAS: 0
VOMITING: 0
NAUSEA: 1
COUGH: 0

## 2018-07-17 ASSESSMENT — GAIT ASSESSMENTS: GAIT LEVEL OF ASSIST: UNABLE TO PARTICIPATE

## 2018-07-17 ASSESSMENT — ACTIVITIES OF DAILY LIVING (ADL): TOILETING: INDEPENDENT

## 2018-07-17 NOTE — THERAPY
"Physical Therapy Evaluation completed.   Bed Mobility:  Supine to Sit: Contact Guard Assist  Transfers: Sit to Stand: Moderate Assist (X2)  Gait: Level Of Assist: Unable to Participate with Front-Wheel Walker       Plan of Care: Will benefit from Physical Therapy 3 times per week and Plan to complete next treatment by Thursday 7/19  Discharge Recommendations: Equipment: Will Continue to Assess for Equipment Needs. Post-acute therapy Discharge to a transitional care facility for continued skilled therapy services.    Pt is a 89 year old female admitted to the hospital following dizziness resulting in GLF and R fibular fractures. Pt is now TTWB, non-operative management. Pt with PMH including CVA, HTN, HLD. Pt reports that she lives alone and was independent with all mobility and ADL's prior to admit. At time of initial evaluation, pt presents with decreased functional mobility, decreased functional strength, difficulty with transfers, poor standing activity tolerance, inability to ambulate, pain and poor activity tolerance. Pt required moderate assistance X 2 for sit to stand. Once standing, difficulty time with maintaining upright standing posture and has increased pain through L knee with upright standing. Moderate assist of 2 to maintain standing and would not be able to maintain TTWB status. Pt would benefit from skilled PT intervention while in the acute care setting to address the listed deficits and improve mobility prior to DC. Pt is NOT safe to DC home alone given pt's lives alone, has stairs to mobilize and level of assistance currently required for mobility tasks. Pt would benefit from post acute transitional care upon DC in a SNF setting to maximize functional mobility and independence prior to DC home    See \"Rehab Therapy-Acute\" Patient Summary Report for complete documentation.     "

## 2018-07-17 NOTE — SENIOR ADMIT NOTE
Senior Admit Note    Date of service: 7/16/2018     Chief complaint: Dizziness, fracture of right fibula    History of present illness: In brief, this very pleasant 89-year-old female with past medical history significant for sick sinus syndrome, hypothyroidism, hypertension, cerebrovascular accident, lipidemia, obstructive sleep apnea, pancytopenia, recently diagnosed atrial fibrillation presents to the ER with complaints of dizziness and recent fall leading to a right fibular fracture.    She states that the dizziness began about a month ago, is almost constant sometimes aggravated periodically.  She does have a history of paroxysmal atrial fibrillation and was recently diagnosed with sick sinus syndrome by Dr. Urena who I attributed the dizziness to be potentially caused by the sick sinus syndrome.  Dr. urena did recommend a pacemaker for possible alleviation of symptoms, however patient declined and did not want to be that aggressive.    ROS:  Constitutional: Denies fevers, chills  HEENT: Denies nasal congestion, sore throat  Respiratory: Denies shortness of breath, cough  Cardiovascular: Denies chest pain, palpitations  Abdomen: Denies nausea, vomiting, abdominal pain  Neurological: Reports dizziness, denies focal weakness    Physical exam:  Constitutional:: Appears comfortable  HEENT: Normocephalic, atraumatic  Respiratory: Clear to auscultation bilaterally  Cardiovascular: Regular rate and rhythm, no murmurs rubs or gallops  Abdomen: Soft, nontender, normal bowel sounds  Neurological: Alert and oriented ×3, no focal deficits identified    Impression: In brief, this very pleasant 89-year-old female being admitted for workup of dizziness.  It is most likely due to sick sinus syndrome has previously mentioned by Dr. urena.  However we will have to exclude other causes and work her up with an echo to exclude valvular heart disease, perform an ultrasound carotids to exclude cardiovascular disease and monitor on  telemetry to detect other abnormal rhythms although she is on a loop recorder so this might not be of much utility.    Plan:  1. admit to telemetry  2.  Get an echo  3.  Ultrasound carotids  4.   PT/OT e dictation is the best and mentioned that ever came a [facility]  5.  Follow-up in orthopedics in 1 or 2 weeks as an outpatient.

## 2018-07-17 NOTE — ASSESSMENT & PLAN NOTE
Patient has documented history of hypertension, though does not take any blood pressure medications at home.  Plan:  -Monitor blood pressure

## 2018-07-17 NOTE — DISCHARGE SUMMARY
Internal Medicine Discharge Summary  Note Author: Antonio Patrick M.D.       Name Afshan Love     1927   Age/Sex 89 y.o. female   MRN [id number]         Admit Date:  2018       Discharge Date:   2018    Service:   [facility] Internal Medicine Red Team  Attending Physician(s):   Dr. Mishra       Senior Resident(s):   Dr. Patrick  Dimas Resident(s):   Dr. Narvaez  PCP: Caitlin Rojas D.O.      Primary Diagnosis:   Closed right fibular fracture  Dizziness    Secondary Diagnoses:                Principal Problem:    Fall POA: Yes  Active Problems:    Closed right fibular fracture POA: Yes    History of CVA (cerebrovascular accident) POA: Yes    Dizziness POA: Clinically Undetermined    Demand ischemia (HCC) POA: Yes    Hypothyroidism POA: Yes    Hypertension POA: Yes  Resolved Problems:    * No resolved hospital problems. *      Hospital Summary (Brief Narrative):       Ms. Love is a pleasant 89 year old female with past medical history significant for sick sinus syndrome, hypothyroidism, hypertension, CVA, dyslipidemia, pancytopenia, paroxysmal atrial fibrillation presented to the ER following an episode of dizziness and fall. The patient also suffered from fracture of right fibula as identified in the ER.    She was monitored on telemetry which did not show any significant arrhythmias. She also got an echo and carotid ultrasound which were unremarkable. PT/OT evaluation were done and recommended a short stay rehab prior to discharge home.     She is therefore being discharged to a SNF in a stable condition.    Cause of dizziness is yet uncertain, however Dr. Valerio (Cardiology) saw the patient about a month ago and recommended a pacemaker for treatment of sick sinus syndrome which is possibly the cause of her dizziness. Discussed with the patient regarding this, she will think about it and follow up with cardiology.    Patient /Hospital Summary (Details -- Problem Oriented) :          History  of CVA (cerebrovascular accident)   Assessment & Plan    Patient has history of CVA.  CT Head negative for acute intracranial abnormalities.        Closed right fibular fracture   Assessment & Plan    Patient states that she hit her leg on her metal furniture when crawling to her room after her fall.  X-ray shows right fibular fracture.  Ortho evaluated her, to follow up in [facility] in 1-2 weeks.        * Fall   Assessment & Plan    Patient presents with a mechanical fall. DDX includes TIA, especially given hx of CVA, also less likely dehydration. Patient shows no signs of prerenal symptoms.  Denies hitting her head or any loss of consciousness.  CT Head Negative.  ECHO was normal.        Demand ischemia (HCC)   Assessment & Plan    Patient had elevated troponin of 0.04 which is trending down to 0.03.  Patient denies chest pain, with recent episode of dizziness, patient may have been dehydrated and troponin elevation may have been due to demand ischemia.        Dizziness   Assessment & Plan    The patient had one acute episode of dizziness prior to her fall, but it has been ongoing for a month now.  CT head shows no acute abnormalities.  Echo and u/s carotids were normal.      Hypertension   Assessment & Plan    Patient has documented history of hypertension, though does not take any blood pressure medications at home.  Will defer starting medications due to recent fall and dizziness.        Hypothyroidism   Assessment & Plan    Patient has chronic history of hypothyroidism.  Continue synthroid 150 mcg            Consultants:     Orthopedic surgery: Dr. Luong    Procedures:        Splinting of right lower extremity    Imaging/ Testing:      ECHOCARDIOGRAM COMP W/O CONT   Final Result      CAROTID DUPLEX         DX-ANKLE 3+ VIEWS RIGHT   Final Result      RIGHT distal fibular fracture      DX-CHEST-2 VIEWS   Final Result      No acute cardiopulmonary abnormality identified.      CT-HEAD W/O   Final  Result      No acute intracranial abnormality        ECHO CONCLUSIONS  Comapred to the prior echo dated 3/31/16, no signficant changes are   noted.    Normal transthoracic echocardiogram.     CAROTID DUPLEX FINDINGS   Right carotid.    Plaque of the bifurcation extending into the internal carotid. Velocities    are consistent with < 50% stenosis of the internal carotid artery.      Left carotid.    Plaque of the bifurcation extending into the internal carotid. Velocities    are consistent with < 50% stenosis of the internal carotid artery.      Bilateral subclavian and vertebral artery waveforms are antegrade and    waveforms are normal in character and velocity.     Discharge Medications:         Medication Reconciliation: Completed       Medication List      CONTINUE taking these medications      Instructions   apixaban 2.5mg Tabs  Commonly known as:  ELIQUIS   Doctor's comments:  Please provide samples.  Take 1 Tab by mouth 2 Times a Day.  Dose:  2.5 mg     B-12 PO   Take 1 Tab by mouth every day.  Dose:  1 Tab     BIOTIN PO   Take 1 Tab by mouth every day.  Dose:  1 Tab     CO Q 10 PO   Take 1 Cap by mouth every day.  Dose:  1 Cap     levothyroxine 150 MCG Tabs  Commonly known as:  SYNTHROID   Take 150 mcg by mouth Every morning on an empty stomach.  Dose:  150 mcg     Vitamin D3 2000 units Tabs   Take 4,000-8,000 Units by mouth every day.  Dose:  3459-3689 Units            Can use .DISCHARGEMEDSLIST if going to another facility         Disposition:   Discharged to skilled nursing facility    Diet:   Healthy diet    Activity:   As tolerated per PT    Instructions:        The patient was instructed to return to the ER in the event of worsening symptoms. I have counseled the patient on the importance of compliance and the patient has agreed to proceed with all medical recommendations and follow up plan indicated above.   The patient understands that all medications come with benefits and risks. Risks may include  permanent injury or death and these risks can be minimized with close reassessment and monitoring.        Primary Care Provider:    Caitlin Rojas D.O.    Discharge summary faxed to primary care provider:  Completed  Copy of discharge summary given to the patient: Deferred      Follow up appointment details :      None scheduled    Pending Studies:        None pending    Time spent on discharge day patient visit, preparing discharge paperwork and arranging for patient follow up.    Summary of follow up issues:   Recommend following up with cardiology for possible pacemaker and with orthopedic surgery for evaluation of right fibula fracture    Discharge Time (Minutes) :    42 minutes  Hospital Course Type: Observation Stay      Condition on Discharge    ______________________________________________________________________    Interval history/exam for day of discharge:     Patient doing about the same. Workup has been negative. Discussed plan with patient who is agreeable.      Most Recent Labs:    Lab Results   Component Value Date/Time    WBC 3.2 (L) 07/17/2018 03:50 AM    RBC 3.59 (L) 07/17/2018 03:50 AM    HEMOGLOBIN 11.1 (L) 07/17/2018 03:50 AM    HEMATOCRIT 34.0 (L) 07/17/2018 03:50 AM    MCV 94.7 07/17/2018 03:50 AM    MCH 30.9 07/17/2018 03:50 AM    MCHC 32.6 (L) 07/17/2018 03:50 AM    MPV 11.1 07/17/2018 03:50 AM    NEUTSPOLYS 83.20 (H) 07/17/2018 03:50 AM    LYMPHOCYTES 12.60 (L) 07/17/2018 03:50 AM    MONOCYTES 4.20 07/17/2018 03:50 AM    EOSINOPHILS 0.00 07/17/2018 03:50 AM    BASOPHILS 0.00 07/17/2018 03:50 AM      Lab Results   Component Value Date/Time    SODIUM 139 07/17/2018 03:50 AM    POTASSIUM 3.7 07/17/2018 03:50 AM    CHLORIDE 110 07/17/2018 03:50 AM    CO2 17 (L) 07/17/2018 03:50 AM    GLUCOSE 96 07/17/2018 03:50 AM    BUN 18 07/17/2018 03:50 AM    CREATININE 0.75 07/17/2018 03:50 AM    CREATININE 0.7 01/29/2007 10:30 AM      Lab Results   Component Value Date/Time    ALTSGPT 9 07/16/2018  11:22 AM    ASTSGOT 22 07/16/2018 11:22 AM    ALKPHOSPHAT 49 07/16/2018 11:22 AM    TBILIRUBIN 0.5 07/16/2018 11:22 AM    ALBUMIN 3.9 07/16/2018 11:22 AM    GLOBULIN 3.2 07/16/2018 11:22 AM    INR 1.03 07/16/2018 11:22 AM     Lab Results   Component Value Date/Time    PROTHROMBTM 13.2 07/16/2018 11:22 AM    INR 1.03 07/16/2018 11:22 AM        Antonio Patrick M.D.    The patient was seen by me face to face. I personally had a discussion with the patient. The case was discussed with our resident team, I examined the patient and confirmed the essential components of the history, physical examination, diagnosis and treatment plan as needed. I agree with the patient care as documented by the resident and edited as above by me. See resident's note above for complete details of service. The overall treatment regimen will be carried out as described above.     Face to face and floor time: 42 mins (discharge time)      Thank you:  Mohit Mishra MD, FACP  [facility] Internal Medicine  Pager: Use Tiger Text (use resident info under treatment team for day to day floor issues)  Office: [phone] Ext. [phone]  Fax: [phone] (non PHI only)

## 2018-07-17 NOTE — PROGRESS NOTES
PT transported to floor via gurney. Pt denies any pain at this time. Admit profile complete. Pt up to date on flu and PNA vaccine. Pt and family updated on POC. Pt denies any needs at this time. Call light and personal belongings within reach. Will continue to monitor.

## 2018-07-17 NOTE — DISCHARGE PLANNING
PT Rocio advised pt will need SNF. Provided pt with choice of SNF, pt said that she had been to [facility] before, pt chose [facility], [facility] and [facility]. Paged [facility] for SNF order. Choice form faxed to [facility].

## 2018-07-17 NOTE — PROGRESS NOTES
Internal Medicine Interval Note  Note Author: Roshan Narvaez D.O.     Name Afshan Love     1927   Age/Sex 89 y.o. female   MRN [id number]   Code Status FULL     After 5PM or if no immediate response to page, please call for cross-coverage  Attending/Team: Tad/Km See Patient List for primary contact information  Call [phone] to page    1st Call - Day Intern (R1):   Dr. Narvaez 2nd Call - Day Sr. Resident (R2/R3):   Dr. Patrick         Reason for interval visit  (Principal Problem)   Dizziness  Mechanical Fall      Interval Problem Daily Status Update  (24 hours, problem oriented, brief subjective history, new lab/imaging data pertinent to that problem)     Patient reports some dizziness this morning and overnight. Also has related nausea but no vomiting. Patient's ECHO today showed EF of 55%. Reports no shortness of breath, no lower extremity edema. UA shows positive leukocyte esterase but negative nitrites. No dysuria, no mental status changes.    Review of Systems   Constitutional: Negative for chills and fever.   HENT: Negative for hearing loss and tinnitus.    Eyes: Negative for blurred vision and double vision.   Respiratory: Negative for cough and shortness of breath.    Cardiovascular: Negative for chest pain and palpitations.   Gastrointestinal: Positive for nausea. Negative for melena and vomiting.   Genitourinary: Negative for dysuria and urgency.   Musculoskeletal: Negative for falls and myalgias.   Skin: Negative for itching and rash.   Neurological: Positive for dizziness. Negative for headaches.   Endo/Heme/Allergies: Negative for environmental allergies. Does not bruise/bleed easily.   Psychiatric/Behavioral: Negative for depression. The patient is not nervous/anxious.        Disposition/Barriers to discharge:   Patient to be discharged today pending ECHO results.    Consultants/Specialty    PCP: Caitlin Rojas D.O.      Quality Measures  Quality-Core Measures    Reviewed items::  Labs reviewed, Medications reviewed, Radiology images reviewed and EKG reviewed  Tristan catheter::  No Tristan  DVT: Patient is anticoagulated on apixiban which is a home medication.          Physical Exam       Vitals:    07/16/18 2104 07/16/18 2348 07/17/18 0346 07/17/18 0827   BP: 129/73 136/64 147/68 139/62   Pulse:  73 78 72   Resp:  15 16 16   Temp: 37.1 °C (98.7 °F) 36.7 °C (98 °F) 36.9 °C (98.5 °F) 36.6 °C (97.9 °F)   SpO2: 94% 95% 94% 93%   Weight:       Height:         Body mass index is 24.03 kg/m². Weight: 63.5 kg (140 lb)  Oxygen Therapy:  Pulse Oximetry: 93 %, O2 (LPM): 0, O2 Delivery: None (Room Air)    Physical Exam   Constitutional: She is oriented to person, place, and time and well-developed, well-nourished, and in no distress. No distress.   HENT:   Head: Normocephalic and atraumatic.   Eyes: Conjunctivae are normal. Pupils are equal, round, and reactive to light.   Neck: Neck supple.   Cardiovascular: Normal rate, regular rhythm and normal heart sounds.  Exam reveals no gallop and no friction rub.    No murmur heard.  Pulmonary/Chest: Effort normal and breath sounds normal. No respiratory distress. She has no wheezes.   Abdominal: Soft. Bowel sounds are normal. She exhibits no distension. There is no tenderness.   No suprapubic tenderness   Musculoskeletal: She exhibits tenderness. She exhibits no edema.   Patient has cast over right lower extremity for fibula fracture. Right lower extremity is tender to palpation.   Neurological: She is alert and oriented to person, place, and time. No cranial nerve deficit.   Skin: Skin is dry. No rash noted. She is not diaphoretic. No erythema.   Psychiatric: Mood, affect and judgment normal.             Assessment/Plan     * Fall- (present on admission)   Assessment & Plan    Patient presents with a mechanical fall. Question sick sinus syndrome. DDX also includes TIA, especially given hx of CVA, also less likely dehydration. Patient shows no  signs of prerenal symptoms.  Denies hitting her head or any loss of consciousness.  CT Head Negative.  ECHO shows EF of 55% with grade 1 diastolic dysfunction. No significant change from previous ECHO in 3/2016.  Plan:  -Continue fall precautions  -PT/OT evaluated patient and recommends discharge for post-acute transitional care to skilled nursing facility.  -Patient accepted at [facility].  -Patient would benefit from outpatient evaluation by cardiologist.        Dizziness   Assessment & Plan    The patient had one acute episode of dizziness prior to her fall.  CT head shows no acute abnormalities.  Patient had ECHO in 3/2016 which showed EF of 60%.  New ECHO shows EF of 55%, grade 1 diastolic dysfunction with no significant changes from previous ECHO.  Plan:  -Continue fall precautions  -Patient would benefit from outpatient cardiology evaluation. Question sick sinus syndrome.        Closed right fibular fracture- (present on admission)   Assessment & Plan    Patient states that she hit her leg on her metal furniture when crawling to her room after her fall.  X-ray shows right fibular fracture.  Plans:  -Patient is anticoagulated on apixiban which she takes at home  -Continue fall precautions.        History of CVA (cerebrovascular accident)- (present on admission)   Assessment & Plan    Patient has history of CVA.  Initial CT Head today negative for acute intracranial abnormalities.  Patient presented with fall today. DDX includes TIA.  Patient has loop monitor implant  Plan:  -Continue to monitor neurological status.        Demand ischemia (HCC)- (present on admission)   Assessment & Plan    Patient had elevated troponin of 0.04 which is trending down to 0.03.  Patient denies chest pain, with recent episode of dizziness, patient may have been dehydrated and troponin elevation may have been due to demand ischemia.  No ST elevations on ECG, though patient does have T wave inversions in V5 and V6 which were  not apparent on previous ECG.  Patient has loop monitor.  Plan:  -Outpatient cardiology consultation.  -Patient appears euvolemic. Hold IV fluids for now.        Hypertension- (present on admission)   Assessment & Plan    Patient has documented history of hypertension, though does not take any blood pressure medications at home.  Plan:  -Monitor blood pressure           Hypothyroidism- (present on admission)   Assessment & Plan    Patient has chronic history of hypothyroidism.  Plan:  -Continue synthroid 150 mcg          Roshan Narvaez D.O.    The patient was seen by me face to face. I personally had a discussion with the patient. The case was discussed with our resident team, I examined the patient and confirmed the essential components of the history, physical examination, diagnosis and treatment plan as needed. I agree with the patient care as documented by the resident and edited as above by me. See resident's note above for complete details of service. The overall treatment regimen will be carried out as described above.     Thank you:  Mohit Mishra MD, FACP  [facility] Internal Medicine  Pager: Use Tiger Text (use resident info under treatment team for day to day floor issues)  Office: [phone] Ext. [phone]  Fax: [phone] (non PHI only)

## 2018-07-17 NOTE — PROGRESS NOTES
Report received from Dayanna SHIN. Assumed pt care. Received pt in bed, awake, AOx4, with splint on right lower extrem. Reports pain with exertion and movt on affected extremity. Assessment done per CDU. D Plan of care discussed/ reviewed with pt, verbalizes understanding. Safety measures and fall precautions in place. Call light within reach. Needs attended. Will continue to assess and monitor

## 2018-07-17 NOTE — DISCHARGE PLANNING
Cabrera confirmed pt will be transported to facility at 4:30 pm, no earlier  time available. Pt informed.

## 2018-07-17 NOTE — PROGRESS NOTES
Pt transported off unit via wheelchair with medical transport. Pt sent with all belongings, cobra paperwork sent with .

## 2018-07-17 NOTE — ASSESSMENT & PLAN NOTE
Patient had elevated troponin of 0.04 which is trending down to 0.03.  Patient denies chest pain, with recent episode of dizziness, patient may have been dehydrated and troponin elevation may have been due to demand ischemia.  No ST elevations on ECG, though patient does have T wave inversions in V5 and V6 which were not apparent on previous ECG.  Patient has loop monitor.  Plan:  -Outpatient cardiology consultation.  -Patient appears euvolemic. Hold IV fluids for now.

## 2018-07-17 NOTE — DISCHARGE PLANNING
Care Transition Team Assessment    Met with pt at bedside. According to pt she lives alone, manages medications and is able to shower and dress independently although pt did say she has considered having help to shower. Has friend that drives her to appointments and assists with laundry. Uses a cane. Pt said her son Alfonso will transport at discharge.      Information Source  Orientation : Oriented x 4  Information Given By: Patient  Informant's Name: Afshan Love    Readmission Evaluation  Is this a readmission?: No    Interdisciplinary Discharge Planning  Does Admitting Nurse Feel This Could be a Complex Discharge?: No  Primary Care Physician: Caitlin Rojas DO  Lives with - Patient's Self Care Capacity: Alone and Able to Care For Self  Patient or legal guardian wants to designate a caregiver (see row info): Yes  Caregiver name: Alfonso Love  Caregiver relationship to patient: son  Caregiver contact info: [phone]  Support Systems: Children, Friends / Neighbors (Has firend that drives to apt's and does laundry)  Housing / Facility: 2 Story Apartment / Condo  Do You Take your Prescribed Medications Regularly: Yes  Able to Return to Previous ADL's: Future Time w/Therapy  Mobility Issues: Yes (Uses a cane)  Prior Services: None  Patient Expects to be Discharged to:: Home  Assistance Needed: No  Durable Medical Equipment: Other - Specify (Cane)    Discharge Preparedness  What are your discharge supports?: Child, Other (comment) (Son Alfonso and friend)  Prior Functional Level: Independent with Activities of Daily Living, Independent with Medication Management, Ambulatory, Uses Cane  Difficulity with ADLs: Walking (Uses cane)  Difficulity with IADLs: Laundry, Driving    Functional Assesment  Prior Functional Level: Independent with Activities of Daily Living, Independent with Medication Management, Ambulatory, Uses Cane    Finances  Prescription Coverage: Yes    Discharge Risks or  Barriers  Discharge risks or barriers?: No    Anticipated Discharge Information  Anticipated discharge disposition: Home  Discharge Address: Pt declined to give physical address  Discharge Contact Phone Number: Felix Hamilton [phone]

## 2018-07-17 NOTE — ASSESSMENT & PLAN NOTE
Patient states that she hit her leg on her metal furniture when crawling to her room after her fall.  X-ray shows right fibular fracture.  Plans:  -Patient is anticoagulated on apixiban which she takes at home  -Continue fall precautions.

## 2018-07-17 NOTE — ASSESSMENT & PLAN NOTE
Patient presents with a mechanical fall. Question sick sinus syndrome. DDX also includes TIA, especially given hx of CVA, also less likely dehydration. Patient shows no signs of prerenal symptoms.  Denies hitting her head or any loss of consciousness.  CT Head Negative.  ECHO shows EF of 55% with grade 1 diastolic dysfunction. No significant change from previous ECHO in 3/2016.  Plan:  -Continue fall precautions  -PT/OT evaluated patient and recommends discharge for post-acute transitional care to skilled nursing facility.  -Patient accepted at [facility].  -Patient would benefit from outpatient evaluation by cardiologist.

## 2018-07-17 NOTE — DISCHARGE PLANNING
Pt accepted at [facility] and [facility]. Pt provided with choice and she chose [facility]. Telephoned Cabrera at [facility] and advised of pt's decision. Offered to telephone pt's son, pt declined saying she would inform her son at noon. Cabrera to call back to confirm  time and provide number for RN hand off.

## 2018-07-17 NOTE — THERAPY
"Occupational Therapy Evaluation completed.   Functional Status:  Pt presents to skilled OT services following GLF 2/2 dizziness resulting in R fibular fx's which in non-op in nature and TTWB RLE, h/o CVA with no residual deficits. Pt performed bed mobility with cga from a raised hob and use of bed rails, mod a x2 with STS eob x2 trials and mod a of 2 to maintain upright standing posture, UB strength functional, demonstrates ability to perform UB ADLs with sba, LB dressing and toileting max a as pt is unable to maintain static standing balance, increased L knee pain with activities. Prior to admission, pt resided by self on 2nd floor apartment and I with ADLs/IADLs except laundry and driving which friend assisted. Currently pt does not demonstrate ability to d/c home by self, recommend post acute therapy prior to d/c home. Will follow while in house to address above mentioned deficits.   Plan of Care: Will benefit from Occupational Therapy 3 times per week  Discharge Recommendations:  Equipment: Will Continue to Assess for Equipment Needs. Post-acute therapy Discharge to a transitional care facility for continued skilled therapy services.    See \"Rehab Therapy-Acute\" Patient Summary Report for complete documentation.    "

## 2018-07-17 NOTE — PROGRESS NOTES
Patient resting comfortably in bed. No complaints at this time. VSS. Assisted with toileting on the bedpan, tolerated and voided well.  0355 Blood specimen collected and sent to lab.

## 2018-07-17 NOTE — ASSESSMENT & PLAN NOTE
The patient had one acute episode of dizziness prior to her fall.  CT head shows no acute abnormalities.  Patient had ECHO in 3/2016 which showed EF of 60%.  New ECHO shows EF of 55%, grade 1 diastolic dysfunction with no significant changes from previous ECHO.  Plan:  -Continue fall precautions  -Patient would benefit from outpatient cardiology evaluation. Question sick sinus syndrome.

## 2018-07-17 NOTE — DISCHARGE INSTRUCTIONS
Discharge Instructions    Discharged to other by medical transportation with escort. Discharged via [facility] escort: yes.  Special equipment needed: Not Applicable    Be sure to schedule a follow-up appointment with your primary care doctor or any specialists as instructed.     Discharge Plan:   Diet Plan: Discussed  Activity Level: Discussed  Confirmed Follow up Appointment: Patient to Call and Schedule Appointment  Confirmed Symptoms Management: Discussed  Medication Reconciliation Updated: Yes  Influenza Vaccine Indication: Not indicated: Previously immunized this influenza season and > 8 years of age    I understand that a diet low in cholesterol, fat, and sodium is recommended for good health. Unless I have been given specific instructions below for another diet, I accept this instruction as my diet prescription.   Other diet: Regular    Special Instructions: None    · Is patient discharged on Warfarin / Coumadin?   No     Depression / Suicide Risk    As you are discharged from this [facility] facility, it is important to learn how to keep safe from harming yourself.    Recognize the warning signs:  · Abrupt changes in personality, positive or negative- including increase in energy   · Giving away possessions  · Change in eating patterns- significant weight changes-  positive or negative  · Change in sleeping patterns- unable to sleep or sleeping all the time   · Unwillingness or inability to communicate  · Depression  · Unusual sadness, discouragement and loneliness  · Talk of wanting to die  · Neglect of personal appearance   · Rebelliousness- reckless behavior  · Withdrawal from people/activities they love  · Confusion- inability to concentrate     If you or a loved one observes any of these behaviors or has concerns about self-harm, here's what you can do:  · Talk about it- your feelings and reasons for harming yourself  · Remove any means that you might use to hurt yourself (examples: pills,  rope, extension cords, firearm)  · Get professional help from the community (Mental Health, Substance Abuse, psychological counseling)  · Do not be alone:Call your Safe Contact- someone whom you trust who will be there for you.  · Call your local CRISIS HOTLINE 966-9028 or 917-003-4910  · Call your local Children's Mobile Crisis Response Team [place] (293) 964-7327 or www.[employer]  · Call the toll free National Suicide Prevention Hotlines   · National Suicide Prevention Lifeline 209-314-IYPM (0472)  · National Hope Line Network 800-SUICIDE (474-0539)

## 2018-07-17 NOTE — DISCHARGE PLANNING
Received Choice form at 0921  Agency/Facility Name: [facility]  Referral sent per Choice form @ 0925    Need SNF Order, notified RN CM

## 2018-07-17 NOTE — ASSESSMENT & PLAN NOTE
Patient has history of CVA.  Initial CT Head today negative for acute intracranial abnormalities.  Patient presented with fall today. DDX includes TIA.  Patient has loop monitor implant  Plan:  -Continue to monitor neurological status.

## 2018-07-18 LAB
BACTERIA UR CULT: NORMAL
SIGNIFICANT IND 70042: NORMAL
SITE SITE: NORMAL
SOURCE SOURCE: NORMAL

## 2018-08-10 LAB — EKG IMPRESSION: NORMAL

## 2018-09-06 ENCOUNTER — HOME HEALTH ADMISSION (OUTPATIENT)
Dept: HOME HEALTH SERVICES | Facility: HOME HEALTHCARE | Age: 83
End: 2018-09-06
Payer: MEDICARE

## 2018-09-07 ENCOUNTER — HOME CARE VISIT (OUTPATIENT)
Dept: HOME HEALTH SERVICES | Facility: HOME HEALTHCARE | Age: 83
End: 2018-09-07
Payer: MEDICARE

## 2018-09-07 ENCOUNTER — TELEPHONE (OUTPATIENT)
Dept: HEALTH INFORMATION MANAGEMENT | Facility: OTHER | Age: 83
End: 2018-09-07

## 2018-09-07 VITALS
HEIGHT: 64 IN | WEIGHT: 124.5 LBS | OXYGEN SATURATION: 96 % | BODY MASS INDEX: 21.25 KG/M2 | DIASTOLIC BLOOD PRESSURE: 66 MMHG | TEMPERATURE: 98 F | SYSTOLIC BLOOD PRESSURE: 112 MMHG | RESPIRATION RATE: 18 BRPM | HEART RATE: 86 BPM

## 2018-09-07 PROCEDURE — 665001 SOC-HOME HEALTH

## 2018-09-07 PROCEDURE — G0493 RN CARE EA 15 MIN HH/HOSPICE: HCPCS

## 2018-09-07 SDOH — ECONOMIC STABILITY: HOUSING INSECURITY: UNSAFE APPLIANCES: 0

## 2018-09-07 SDOH — ECONOMIC STABILITY: HOUSING INSECURITY: UNSAFE COOKING RANGE AREA: 0

## 2018-09-07 ASSESSMENT — ENCOUNTER SYMPTOMS
SHORTNESS OF BREATH: T
VOMITING: PATIENT VERBALIZES NO EMESIS.
NAUSEA: PATIENT VERBALIZES NO NAUSEA.
DIFFICULTY THINKING: 1

## 2018-09-07 ASSESSMENT — PATIENT HEALTH QUESTIONNAIRE - PHQ9
2. FEELING DOWN, DEPRESSED, IRRITABLE, OR HOPELESS: 00
1. LITTLE INTEREST OR PLEASURE IN DOING THINGS: 00

## 2018-09-07 ASSESSMENT — ACTIVITIES OF DAILY LIVING (ADL)
OASIS_M1830: 03
HOME_HEALTH_OASIS: 02

## 2018-09-07 NOTE — TELEPHONE ENCOUNTER
Received referral from [place]. Medications reviewed. No clinically significant interactions or medication issues noted.     Gissell Murphy, StacyD

## 2018-09-10 ENCOUNTER — TELEPHONE (OUTPATIENT)
Dept: CARDIOLOGY | Facility: MEDICAL CENTER | Age: 83
End: 2018-09-10

## 2018-09-10 ENCOUNTER — HOME CARE VISIT (OUTPATIENT)
Dept: HOME HEALTH SERVICES | Facility: HOME HEALTHCARE | Age: 83
End: 2018-09-10
Payer: MEDICARE

## 2018-09-10 VITALS
HEART RATE: 91 BPM | DIASTOLIC BLOOD PRESSURE: 62 MMHG | TEMPERATURE: 97.9 F | RESPIRATION RATE: 18 BRPM | SYSTOLIC BLOOD PRESSURE: 130 MMHG

## 2018-09-10 DIAGNOSIS — Z86.73 HISTORY OF CVA (CEREBROVASCULAR ACCIDENT): ICD-10-CM

## 2018-09-10 PROCEDURE — G0153 HHCP-SVS OF S/L PATH,EA 15MN: HCPCS

## 2018-09-10 NOTE — TELEPHONE ENCOUNTER
----- Message from Anna Walls sent at 9/10/2018 12:44 PM PDT -----  Regarding: Patient wants to get samples of Eliquis  LISSET/Silvia    Patient's son, Alfonso, wants to get samples of Eliquis called into the [facility] for his mother. He can be reached at [phone].

## 2018-09-11 ENCOUNTER — HOME CARE VISIT (OUTPATIENT)
Dept: HOME HEALTH SERVICES | Facility: HOME HEALTHCARE | Age: 83
End: 2018-09-11
Payer: MEDICARE

## 2018-09-11 PROCEDURE — G0299 HHS/HOSPICE OF RN EA 15 MIN: HCPCS

## 2018-09-12 ENCOUNTER — HOME CARE VISIT (OUTPATIENT)
Dept: HOME HEALTH SERVICES | Facility: HOME HEALTHCARE | Age: 83
End: 2018-09-12
Payer: MEDICARE

## 2018-09-12 VITALS
OXYGEN SATURATION: 94 % | DIASTOLIC BLOOD PRESSURE: 68 MMHG | SYSTOLIC BLOOD PRESSURE: 118 MMHG | TEMPERATURE: 98.4 F | HEART RATE: 76 BPM | RESPIRATION RATE: 16 BRPM

## 2018-09-12 VITALS
OXYGEN SATURATION: 94 % | DIASTOLIC BLOOD PRESSURE: 60 MMHG | TEMPERATURE: 96.9 F | RESPIRATION RATE: 17 BRPM | SYSTOLIC BLOOD PRESSURE: 110 MMHG | HEART RATE: 86 BPM

## 2018-09-12 PROCEDURE — G0152 HHCP-SERV OF OT,EA 15 MIN: HCPCS

## 2018-09-12 PROCEDURE — G0151 HHCP-SERV OF PT,EA 15 MIN: HCPCS

## 2018-09-12 ASSESSMENT — ACTIVITIES OF DAILY LIVING (ADL)
IADLS_COMMENTS: <!--EPICS-->SEE OT ASSESSMENT<!--EPICE-->
ADLS_COMMENTS: <!--EPICS-->SEE OT ASSESSMENT<!--EPICE-->

## 2018-09-13 VITALS
HEART RATE: 76 BPM | RESPIRATION RATE: 17 BRPM | OXYGEN SATURATION: 94 % | TEMPERATURE: 98.1 F | DIASTOLIC BLOOD PRESSURE: 58 MMHG | SYSTOLIC BLOOD PRESSURE: 108 MMHG

## 2018-09-14 ENCOUNTER — HOME CARE VISIT (OUTPATIENT)
Dept: HOME HEALTH SERVICES | Facility: HOME HEALTHCARE | Age: 83
End: 2018-09-14
Payer: MEDICARE

## 2018-09-16 SDOH — ECONOMIC STABILITY: HOUSING INSECURITY: UNSAFE COOKING RANGE AREA: 0

## 2018-09-16 SDOH — ECONOMIC STABILITY: HOUSING INSECURITY: UNSAFE APPLIANCES: 0

## 2018-09-16 ASSESSMENT — ENCOUNTER SYMPTOMS: NAUSEA: DENIES ANY

## 2018-09-18 ENCOUNTER — HOME CARE VISIT (OUTPATIENT)
Dept: HOME HEALTH SERVICES | Facility: HOME HEALTHCARE | Age: 83
End: 2018-09-18
Payer: MEDICARE

## 2018-09-18 ASSESSMENT — ACTIVITIES OF DAILY LIVING (ADL)
BATHING_ASSISTANCE: 4
DRESSING_LB_ASSISTANCE: 1
TOILETING_COMMENTS: DECLINED NEED
TOILETING_ASSISTANCE: 0
ORAL_CARE_ASSISTANCE: 0
BATHING_ASSISTIVE_EQUIPMENT_USED: SHOWER CHAIR, HANDHELD SHOWER, GRAB BARS
TOILETING_EQUIPMENT_NEEDED: TOILET RISER
GROOMING_ASSISTANCE: 0
DRESSING_UB_ASSISTANCE: 1
EATING_ASSISTANCE: 0
TELEPHONE_ASSISTANCE: 0

## 2018-09-19 ENCOUNTER — HOME CARE VISIT (OUTPATIENT)
Dept: HOME HEALTH SERVICES | Facility: HOME HEALTHCARE | Age: 83
End: 2018-09-19
Payer: MEDICARE

## 2018-09-19 PROCEDURE — G0155 HHCP-SVS OF CSW,EA 15 MIN: HCPCS

## 2018-09-20 ENCOUNTER — HOME CARE VISIT (OUTPATIENT)
Dept: HOME HEALTH SERVICES | Facility: HOME HEALTHCARE | Age: 83
End: 2018-09-20
Payer: MEDICARE

## 2018-09-21 ENCOUNTER — HOME CARE VISIT (OUTPATIENT)
Dept: HOME HEALTH SERVICES | Facility: HOME HEALTHCARE | Age: 83
End: 2018-09-21
Payer: MEDICARE

## 2018-09-21 PROCEDURE — G0180 MD CERTIFICATION HHA PATIENT: HCPCS | Performed by: FAMILY MEDICINE

## 2018-09-21 ASSESSMENT — ACTIVITIES OF DAILY LIVING (ADL): HOME_HEALTH_OASIS: 01

## 2018-09-25 ENCOUNTER — HOME CARE VISIT (OUTPATIENT)
Dept: HOME HEALTH SERVICES | Facility: HOME HEALTHCARE | Age: 83
End: 2018-09-25
Payer: MEDICARE

## 2018-09-25 VITALS
OXYGEN SATURATION: 99 % | DIASTOLIC BLOOD PRESSURE: 60 MMHG | RESPIRATION RATE: 18 BRPM | TEMPERATURE: 97.2 F | SYSTOLIC BLOOD PRESSURE: 110 MMHG | HEART RATE: 81 BPM

## 2018-09-25 PROCEDURE — G0299 HHS/HOSPICE OF RN EA 15 MIN: HCPCS

## 2018-09-25 ASSESSMENT — ENCOUNTER SYMPTOMS
VOMITING: DENIES
NAUSEA: DENIES

## 2018-09-27 ENCOUNTER — HOME CARE VISIT (OUTPATIENT)
Dept: HOME HEALTH SERVICES | Facility: HOME HEALTHCARE | Age: 83
End: 2018-09-27
Payer: MEDICARE

## 2018-09-27 VITALS
DIASTOLIC BLOOD PRESSURE: 62 MMHG | SYSTOLIC BLOOD PRESSURE: 120 MMHG | HEART RATE: 81 BPM | TEMPERATURE: 98.1 F | OXYGEN SATURATION: 98 % | RESPIRATION RATE: 18 BRPM

## 2018-09-27 PROCEDURE — G0156 HHCP-SVS OF AIDE,EA 15 MIN: HCPCS

## 2018-09-28 ENCOUNTER — HOME CARE VISIT (OUTPATIENT)
Dept: HOME HEALTH SERVICES | Facility: HOME HEALTHCARE | Age: 83
End: 2018-09-28
Payer: MEDICARE

## 2018-09-28 VITALS
SYSTOLIC BLOOD PRESSURE: 118 MMHG | OXYGEN SATURATION: 99 % | HEART RATE: 80 BPM | TEMPERATURE: 98 F | RESPIRATION RATE: 18 BRPM | DIASTOLIC BLOOD PRESSURE: 60 MMHG

## 2018-09-28 PROCEDURE — G0299 HHS/HOSPICE OF RN EA 15 MIN: HCPCS

## 2018-09-28 ASSESSMENT — ENCOUNTER SYMPTOMS
NAUSEA: DENIES
SHORTNESS OF BREATH: T
VOMITING: DENIES

## 2018-10-02 ENCOUNTER — HOME CARE VISIT (OUTPATIENT)
Dept: HOME HEALTH SERVICES | Facility: HOME HEALTHCARE | Age: 83
End: 2018-10-02
Payer: MEDICARE

## 2018-10-02 VITALS
RESPIRATION RATE: 16 BRPM | DIASTOLIC BLOOD PRESSURE: 60 MMHG | OXYGEN SATURATION: 93 % | TEMPERATURE: 97.7 F | HEART RATE: 77 BPM | SYSTOLIC BLOOD PRESSURE: 120 MMHG

## 2018-10-02 VITALS
OXYGEN SATURATION: 99 % | SYSTOLIC BLOOD PRESSURE: 122 MMHG | RESPIRATION RATE: 18 BRPM | DIASTOLIC BLOOD PRESSURE: 70 MMHG | TEMPERATURE: 98.5 F | HEART RATE: 85 BPM

## 2018-10-02 PROCEDURE — G0299 HHS/HOSPICE OF RN EA 15 MIN: HCPCS

## 2018-10-02 PROCEDURE — G0156 HHCP-SVS OF AIDE,EA 15 MIN: HCPCS

## 2018-10-02 ASSESSMENT — ENCOUNTER SYMPTOMS
VOMITING: DENIES
NAUSEA: DENIES
SHORTNESS OF BREATH: T

## 2018-10-04 ENCOUNTER — HOME CARE VISIT (OUTPATIENT)
Dept: HOME HEALTH SERVICES | Facility: HOME HEALTHCARE | Age: 83
End: 2018-10-04
Payer: MEDICARE

## 2018-10-04 VITALS
OXYGEN SATURATION: 98 % | DIASTOLIC BLOOD PRESSURE: 70 MMHG | RESPIRATION RATE: 18 BRPM | HEART RATE: 80 BPM | SYSTOLIC BLOOD PRESSURE: 132 MMHG | TEMPERATURE: 98.7 F

## 2018-10-04 PROCEDURE — G0156 HHCP-SVS OF AIDE,EA 15 MIN: HCPCS

## 2018-10-05 ENCOUNTER — HOME CARE VISIT (OUTPATIENT)
Dept: HOME HEALTH SERVICES | Facility: HOME HEALTHCARE | Age: 83
End: 2018-10-05
Payer: MEDICARE

## 2018-10-05 PROCEDURE — G0299 HHS/HOSPICE OF RN EA 15 MIN: HCPCS

## 2018-10-05 RX ORDER — LEVOTHYROXINE SODIUM 0.15 MG/1
150 TABLET ORAL
Qty: 90 TAB | Refills: 1 | Status: SHIPPED | OUTPATIENT
Start: 2018-10-05

## 2018-10-07 VITALS
DIASTOLIC BLOOD PRESSURE: 70 MMHG | OXYGEN SATURATION: 98 % | TEMPERATURE: 98.3 F | HEART RATE: 80 BPM | SYSTOLIC BLOOD PRESSURE: 132 MMHG | RESPIRATION RATE: 18 BRPM

## 2018-10-07 ASSESSMENT — ENCOUNTER SYMPTOMS
SHORTNESS OF BREATH: T
NAUSEA: DENIES
VOMITING: DENIES

## 2018-10-09 ENCOUNTER — HOME CARE VISIT (OUTPATIENT)
Dept: HOME HEALTH SERVICES | Facility: HOME HEALTHCARE | Age: 83
End: 2018-10-09
Payer: MEDICARE

## 2018-10-09 VITALS
TEMPERATURE: 98.2 F | SYSTOLIC BLOOD PRESSURE: 115 MMHG | HEART RATE: 87 BPM | DIASTOLIC BLOOD PRESSURE: 60 MMHG | RESPIRATION RATE: 18 BRPM | OXYGEN SATURATION: 98 %

## 2018-10-09 PROCEDURE — G0155 HHCP-SVS OF CSW,EA 15 MIN: HCPCS

## 2018-10-09 PROCEDURE — G0156 HHCP-SVS OF AIDE,EA 15 MIN: HCPCS

## 2018-10-12 ENCOUNTER — HOME CARE VISIT (OUTPATIENT)
Dept: HOME HEALTH SERVICES | Facility: HOME HEALTHCARE | Age: 83
End: 2018-10-12
Payer: MEDICARE

## 2018-10-12 PROCEDURE — G0493 RN CARE EA 15 MIN HH/HOSPICE: HCPCS

## 2018-10-14 VITALS
OXYGEN SATURATION: 97 % | DIASTOLIC BLOOD PRESSURE: 60 MMHG | RESPIRATION RATE: 18 BRPM | HEART RATE: 91 BPM | TEMPERATURE: 97.2 F | SYSTOLIC BLOOD PRESSURE: 128 MMHG

## 2018-10-14 SDOH — ECONOMIC STABILITY: HOUSING INSECURITY: UNSAFE APPLIANCES: 0

## 2018-10-14 SDOH — ECONOMIC STABILITY: HOUSING INSECURITY: UNSAFE COOKING RANGE AREA: 0

## 2018-10-14 ASSESSMENT — ACTIVITIES OF DAILY LIVING (ADL)
HOME_HEALTH_OASIS: 00
OASIS_M1830: 02

## 2018-10-16 ASSESSMENT — ACTIVITIES OF DAILY LIVING (ADL): HOME_HEALTH_OASIS: 00

## 2018-10-18 ENCOUNTER — OFFICE VISIT (OUTPATIENT)
Dept: MEDICAL GROUP | Facility: PHYSICIAN GROUP | Age: 83
End: 2018-10-18
Payer: MEDICARE

## 2018-10-18 ENCOUNTER — HOME HEALTH ADMISSION (OUTPATIENT)
Dept: HOME HEALTH SERVICES | Facility: HOME HEALTHCARE | Age: 83
End: 2018-10-18
Payer: MEDICARE

## 2018-10-18 VITALS
HEART RATE: 125 BPM | HEIGHT: 64 IN | DIASTOLIC BLOOD PRESSURE: 68 MMHG | SYSTOLIC BLOOD PRESSURE: 118 MMHG | BODY MASS INDEX: 20.32 KG/M2 | TEMPERATURE: 97.6 F | OXYGEN SATURATION: 96 % | WEIGHT: 119 LBS

## 2018-10-18 DIAGNOSIS — R63.0 DECREASED APPETITE: ICD-10-CM

## 2018-10-18 DIAGNOSIS — R21 RASH: ICD-10-CM

## 2018-10-18 DIAGNOSIS — S82.831D CLOSED FRACTURE OF DISTAL END OF RIGHT FIBULA WITH ROUTINE HEALING, UNSPECIFIED FRACTURE MORPHOLOGY, SUBSEQUENT ENCOUNTER: ICD-10-CM

## 2018-10-18 PROCEDURE — 99214 OFFICE O/P EST MOD 30 MIN: CPT | Performed by: FAMILY MEDICINE

## 2018-10-18 RX ORDER — CLOBETASOL PROPIONATE 0.5 MG/ML
LOTION TOPICAL
Qty: 1 BOTTLE | Refills: 1 | Status: SHIPPED | OUTPATIENT
Start: 2018-10-18

## 2018-10-18 RX ORDER — HYDROXYZINE HYDROCHLORIDE 25 MG/1
25 TABLET, FILM COATED ORAL
Qty: 30 TAB | Refills: 1 | Status: SHIPPED | OUTPATIENT
Start: 2018-10-18

## 2018-10-18 NOTE — PROGRESS NOTES
Subjective:   Afshan Love is a 89 y.o. female here today for fibular fracture, rash, decreased appetite    Closed right fibular fracture  This problem is new to me.  Patient had a fracture over 3 months ago, presented to the ER, was admitted to the hospital and treated appropriately.  Afterwards she was placed in a skilled nursing facility and then had home health.  She states that she still has pain and decreased mobility secondary to this issue.      Rash  This problem is new to me.  Patient is reporting an itchy rash over the majority of her body.  She states that it is over her entire trunk and does extend into the top part of the arms and the top part of both of her legs.  She states that it is red, itchy in nature, comes and goes.  She states that because the skin is so dry and itchy that some of the red patches open up and bleed.  She has tried over-the-counter medication but nothing has been beneficial.    Decreased appetite  This problem is new to me.  Patient reports that she has had a significant decrease in her appetite since her hospitalization.  She reports that due to pain and not liking the food in the hospital the rehab facility patient started eating less and less.  As a result, patient has lost a significant amount of weight since her most recent appointment back in the spring.  Patient states that she just does not have an appetite; she denies any other symptoms such as nausea, vomiting, abdominal pain.  She reports that she has been drinking one nutritional supplement drink a day, eat some fruit, but does not need anything else.     Patient is present with her son    Current medicines (including changes today)  Current Outpatient Prescriptions   Medication Sig Dispense Refill   • Clobetasol Propionate 0.05 % Lotion Apply twice daily to affected areas 1 Bottle 1   • hydrOXYzine HCl (ATARAX) 25 MG Tab Take 1 Tab by mouth 1 time daily as needed for Itching. 30 Tab 1   • levothyroxine  "(SYNTHROID) 150 MCG Tab Take 1 Tab by mouth Every morning on an empty stomach. 90 Tab 1   • apixaban (ELIQUIS) 2.5mg Tab Take 1 Tab by mouth 2 Times a Day. 60 Tab 0   • polyethylene glycol 3350 (MIRALAX) Powder Take 17 g by mouth every day.       No current facility-administered medications for this visit.      She  has a past medical history of Arthritis; Bronchitis; Bruxism; CATARACT; Chickenpox; COPD (chronic obstructive pulmonary disease) ([facility]); Fibromyalgia; Irish measles; Hyperlipidemia; Pericarditis (1960s); Pneumonia; Restless leg syndrome; Sleep apnea; Snoring; and Unspecified disorder of thyroid.    ROS   No chest pain, no shortness of breath, no abdominal pain  +weakness     Objective:     Blood pressure 118/68, pulse (!) 125, temperature 36.4 °C (97.6 °F), height 1.626 m (5' 4\"), weight 54 kg (119 lb), SpO2 96 %, not currently breastfeeding. Body mass index is 20.43 kg/m².   Physical Exam:  Alert, oriented in no acute distress.  Eye contact is good, speech goal directed, affect calm  HEENT: conjunctiva non-injected, sclera non-icteric.  Pinna normal. Oral mucous membranes pink and moist with no lesions.  Neck No adenopathy or masses in the neck or supraclavicular regions.  Lungs: clear to auscultation bilaterally with good excursion.  CV: regular rate and rhythm. Mild systolic ejection murmur  Abdomen: soft, nontender, No CVAT  Ext: no edema, color normal, vascularity normal, temperature normal        Assessment and Plan:   The following treatment plan was discussed   1. Rash      Unknown origin; prescription clobetasol along with Atarax provided; monitor for tolerance and effect   2. Decreased appetite  REFERRAL TO HOME HEALTH    Uncontrolled; strongly encouraged patient to eat a small snack every 2-3 hours; hopeful that Atarax will also help improve appetite   3. Closed fracture of distal end of right fibula with routine healing, unspecified fracture morphology, subsequent encounter  REFERRAL TO HOME " HEALTH    Mild improvement; home health ordered for reevaluation for physical therapy and nursing support at home; monitor       Followup: Return in about 3 months (around 1/18/2019) for weight loss, Short.

## 2018-10-18 NOTE — ASSESSMENT & PLAN NOTE
This problem is new to me.  Patient reports that she has had a significant decrease in her appetite since her hospitalization.  She reports that due to pain and not liking the food in the hospital the rehab facility patient started eating less and less.  As a result, patient has lost a significant amount of weight since her most recent appointment back in the spring.  Patient states that she just does not have an appetite; she denies any other symptoms such as nausea, vomiting, abdominal pain.  She reports that she has been drinking one nutritional supplement drink a day, eat some fruit, but does not need anything else.

## 2018-10-18 NOTE — ASSESSMENT & PLAN NOTE
This problem is new to me.  Patient is reporting an itchy rash over the majority of her body.  She states that it is over her entire trunk and does extend into the top part of the arms and the top part of both of her legs.  She states that it is red, itchy in nature, comes and goes.  She states that because the skin is so dry and itchy that some of the red patches open up and bleed.  She has tried over-the-counter medication but nothing has been beneficial.

## 2018-10-18 NOTE — ASSESSMENT & PLAN NOTE
This problem is new to me.  Patient had a fracture over 3 months ago, presented to the ER, was admitted to the hospital and treated appropriately.  Afterwards she was placed in a skilled nursing facility and then had home health.  She states that she still has pain and decreased mobility secondary to this issue.

## 2018-11-04 ENCOUNTER — APPOINTMENT (OUTPATIENT)
Dept: RADIOLOGY | Facility: MEDICAL CENTER | Age: 83
DRG: 482 | End: 2018-11-04
Attending: EMERGENCY MEDICINE
Payer: MEDICARE

## 2018-11-04 ENCOUNTER — HOSPITAL ENCOUNTER (INPATIENT)
Facility: MEDICAL CENTER | Age: 83
LOS: 4 days | DRG: 482 | End: 2018-11-08
Attending: EMERGENCY MEDICINE | Admitting: FAMILY MEDICINE
Payer: MEDICARE
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
## 2018-11-04 DIAGNOSIS — R21 RASH: ICD-10-CM

## 2018-11-04 DIAGNOSIS — S82.831D CLOSED FRACTURE OF DISTAL END OF RIGHT FIBULA WITH ROUTINE HEALING, UNSPECIFIED FRACTURE MORPHOLOGY, SUBSEQUENT ENCOUNTER: ICD-10-CM

## 2018-11-04 DIAGNOSIS — I24.89 DEMAND ISCHEMIA: ICD-10-CM

## 2018-11-04 DIAGNOSIS — S72.001A CLOSED FRACTURE OF NECK OF RIGHT FEMUR, INITIAL ENCOUNTER (HCC): ICD-10-CM

## 2018-11-04 DIAGNOSIS — R63.0 DECREASED APPETITE: ICD-10-CM

## 2018-11-04 DIAGNOSIS — R62.7 FTT (FAILURE TO THRIVE) IN ADULT: ICD-10-CM

## 2018-11-04 DIAGNOSIS — R42 DIZZINESS: ICD-10-CM

## 2018-11-04 PROBLEM — I48.0 PAF (PAROXYSMAL ATRIAL FIBRILLATION) (HCC): Status: ACTIVE | Noted: 2018-11-04

## 2018-11-04 PROBLEM — R29.6 FREQUENT FALLS: Status: ACTIVE | Noted: 2018-11-04

## 2018-11-04 LAB
ALBUMIN SERPL BCP-MCNC: 3.5 G/DL (ref 3.2–4.9)
ALBUMIN SERPL BCP-MCNC: NORMAL G/DL (ref 3.2–4.9)
ALBUMIN/GLOB SERPL: 1.2 G/DL
ALBUMIN/GLOB SERPL: NORMAL G/DL
ALP SERPL-CCNC: 42 U/L (ref 30–99)
ALP SERPL-CCNC: NORMAL U/L (ref 30–99)
ALT SERPL-CCNC: 13 U/L (ref 2–50)
ALT SERPL-CCNC: NORMAL U/L (ref 2–50)
ANION GAP SERPL CALC-SCNC: 8 MMOL/L (ref 0–11.9)
ANION GAP SERPL CALC-SCNC: NORMAL MMOL/L (ref 0–11.9)
APTT PPP: 25.4 SEC (ref 24.7–36)
APTT PPP: NORMAL SEC (ref 24.7–36)
AST SERPL-CCNC: 27 U/L (ref 12–45)
AST SERPL-CCNC: NORMAL U/L (ref 12–45)
BASOPHILS # BLD AUTO: 0.3 % (ref 0–1.8)
BASOPHILS # BLD AUTO: NORMAL % (ref 0–1.8)
BASOPHILS # BLD: 0.02 K/UL (ref 0–0.12)
BASOPHILS # BLD: NORMAL K/UL (ref 0–0.12)
BILIRUB SERPL-MCNC: 0.6 MG/DL (ref 0.1–1.5)
BILIRUB SERPL-MCNC: NORMAL MG/DL (ref 0.1–1.5)
BUN SERPL-MCNC: 16 MG/DL (ref 8–22)
BUN SERPL-MCNC: NORMAL MG/DL (ref 8–22)
CALCIUM SERPL-MCNC: 9 MG/DL (ref 8.5–10.5)
CALCIUM SERPL-MCNC: NORMAL MG/DL (ref 8.5–10.5)
CHLORIDE SERPL-SCNC: 110 MMOL/L (ref 96–112)
CHLORIDE SERPL-SCNC: NORMAL MMOL/L (ref 96–112)
CO2 SERPL-SCNC: 24 MMOL/L (ref 20–33)
CO2 SERPL-SCNC: NORMAL MMOL/L (ref 20–33)
CREAT SERPL-MCNC: 0.6 MG/DL (ref 0.5–1.4)
CREAT SERPL-MCNC: NORMAL MG/DL (ref 0.5–1.4)
EOSINOPHIL # BLD AUTO: 0.02 K/UL (ref 0–0.51)
EOSINOPHIL # BLD AUTO: NORMAL K/UL (ref 0–0.51)
EOSINOPHIL NFR BLD: 0.3 % (ref 0–6.9)
EOSINOPHIL NFR BLD: NORMAL % (ref 0–6.9)
ERYTHROCYTE [DISTWIDTH] IN BLOOD BY AUTOMATED COUNT: 47.5 FL (ref 35.9–50)
ERYTHROCYTE [DISTWIDTH] IN BLOOD BY AUTOMATED COUNT: NORMAL FL (ref 35.9–50)
GLOBULIN SER CALC-MCNC: 2.9 G/DL (ref 1.9–3.5)
GLOBULIN SER CALC-MCNC: NORMAL G/DL (ref 1.9–3.5)
GLUCOSE SERPL-MCNC: 95 MG/DL (ref 65–99)
GLUCOSE SERPL-MCNC: NORMAL MG/DL (ref 65–99)
HCT VFR BLD AUTO: 35.8 % (ref 37–47)
HCT VFR BLD AUTO: NORMAL % (ref 37–47)
HGB BLD-MCNC: 11.4 G/DL (ref 12–16)
HGB BLD-MCNC: NORMAL G/DL (ref 12–16)
IMM GRANULOCYTES # BLD AUTO: 0.03 K/UL (ref 0–0.11)
IMM GRANULOCYTES # BLD AUTO: NORMAL K/UL (ref 0–0.11)
IMM GRANULOCYTES NFR BLD AUTO: 0.4 % (ref 0–0.9)
IMM GRANULOCYTES NFR BLD AUTO: NORMAL % (ref 0–0.9)
INR PPP: 1.07 (ref 0.87–1.13)
INR PPP: NORMAL (ref 0.87–1.13)
LYMPHOCYTES # BLD AUTO: 1.24 K/UL (ref 1–4.8)
LYMPHOCYTES # BLD AUTO: NORMAL K/UL (ref 1–4.8)
LYMPHOCYTES NFR BLD: 15.8 % (ref 22–41)
LYMPHOCYTES NFR BLD: NORMAL % (ref 22–41)
MCH RBC QN AUTO: 29.8 PG (ref 27–33)
MCH RBC QN AUTO: NORMAL PG (ref 27–33)
MCHC RBC AUTO-ENTMCNC: 31.8 G/DL (ref 33.6–35)
MCHC RBC AUTO-ENTMCNC: NORMAL G/DL (ref 33.6–35)
MCV RBC AUTO: 93.7 FL (ref 81.4–97.8)
MCV RBC AUTO: NORMAL FL (ref 81.4–97.8)
MONOCYTES # BLD AUTO: 0.59 K/UL (ref 0–0.85)
MONOCYTES # BLD AUTO: NORMAL K/UL (ref 0–0.85)
MONOCYTES NFR BLD AUTO: 7.5 % (ref 0–13.4)
MONOCYTES NFR BLD AUTO: NORMAL % (ref 0–13.4)
NEUTROPHILS # BLD AUTO: 5.95 K/UL (ref 2–7.15)
NEUTROPHILS # BLD AUTO: NORMAL K/UL (ref 2–7.15)
NEUTROPHILS NFR BLD: 75.7 % (ref 44–72)
NEUTROPHILS NFR BLD: NORMAL % (ref 44–72)
NRBC # BLD AUTO: 0 K/UL
NRBC # BLD AUTO: NORMAL K/UL
NRBC BLD-RTO: 0 /100 WBC
NRBC BLD-RTO: NORMAL /100 WBC
PLATELET # BLD AUTO: 133 K/UL (ref 164–446)
PLATELET # BLD AUTO: NORMAL K/UL (ref 164–446)
PMV BLD AUTO: 11.4 FL (ref 9–12.9)
PMV BLD AUTO: NORMAL FL (ref 9–12.9)
POTASSIUM SERPL-SCNC: 3.9 MMOL/L (ref 3.6–5.5)
POTASSIUM SERPL-SCNC: NORMAL MMOL/L (ref 3.6–5.5)
PROT SERPL-MCNC: 6.4 G/DL (ref 6–8.2)
PROT SERPL-MCNC: NORMAL G/DL (ref 6–8.2)
PROTHROMBIN TIME: 14 SEC (ref 12–14.6)
PROTHROMBIN TIME: NORMAL SEC (ref 12–14.6)
RBC # BLD AUTO: 3.82 M/UL (ref 4.2–5.4)
RBC # BLD AUTO: NORMAL M/UL (ref 4.2–5.4)
SODIUM SERPL-SCNC: 142 MMOL/L (ref 135–145)
SODIUM SERPL-SCNC: NORMAL MMOL/L (ref 135–145)
TSH SERPL DL<=0.005 MIU/L-ACNC: 0.01 UIU/ML (ref 0.38–5.33)
WBC # BLD AUTO: 7.9 K/UL (ref 4.8–10.8)
WBC # BLD AUTO: NORMAL K/UL (ref 4.8–10.8)

## 2018-11-04 PROCEDURE — 84443 ASSAY THYROID STIM HORMONE: CPT

## 2018-11-04 PROCEDURE — 770006 HCHG ROOM/CARE - MED/SURG/GYN SEMI*

## 2018-11-04 PROCEDURE — A9270 NON-COVERED ITEM OR SERVICE: HCPCS | Performed by: FAMILY MEDICINE

## 2018-11-04 PROCEDURE — 96375 TX/PRO/DX INJ NEW DRUG ADDON: CPT

## 2018-11-04 PROCEDURE — 700105 HCHG RX REV CODE 258: Performed by: FAMILY MEDICINE

## 2018-11-04 PROCEDURE — 99223 1ST HOSP IP/OBS HIGH 75: CPT | Performed by: FAMILY MEDICINE

## 2018-11-04 PROCEDURE — 85610 PROTHROMBIN TIME: CPT

## 2018-11-04 PROCEDURE — 71045 X-RAY EXAM CHEST 1 VIEW: CPT

## 2018-11-04 PROCEDURE — 85730 THROMBOPLASTIN TIME PARTIAL: CPT

## 2018-11-04 PROCEDURE — 80053 COMPREHEN METABOLIC PANEL: CPT

## 2018-11-04 PROCEDURE — 93005 ELECTROCARDIOGRAM TRACING: CPT | Performed by: EMERGENCY MEDICINE

## 2018-11-04 PROCEDURE — 72192 CT PELVIS W/O DYE: CPT

## 2018-11-04 PROCEDURE — 36415 COLL VENOUS BLD VENIPUNCTURE: CPT

## 2018-11-04 PROCEDURE — 73552 X-RAY EXAM OF FEMUR 2/>: CPT | Mod: RT

## 2018-11-04 PROCEDURE — 96374 THER/PROPH/DIAG INJ IV PUSH: CPT

## 2018-11-04 PROCEDURE — 73560 X-RAY EXAM OF KNEE 1 OR 2: CPT | Mod: RT

## 2018-11-04 PROCEDURE — 72170 X-RAY EXAM OF PELVIS: CPT

## 2018-11-04 PROCEDURE — 94760 N-INVAS EAR/PLS OXIMETRY 1: CPT

## 2018-11-04 PROCEDURE — 85025 COMPLETE CBC W/AUTO DIFF WBC: CPT

## 2018-11-04 PROCEDURE — 700111 HCHG RX REV CODE 636 W/ 250 OVERRIDE (IP): Performed by: EMERGENCY MEDICINE

## 2018-11-04 PROCEDURE — 99285 EMERGENCY DEPT VISIT HI MDM: CPT

## 2018-11-04 PROCEDURE — 700102 HCHG RX REV CODE 250 W/ 637 OVERRIDE(OP): Performed by: FAMILY MEDICINE

## 2018-11-04 RX ORDER — POLYETHYLENE GLYCOL 3350 17 G/17G
1 POWDER, FOR SOLUTION ORAL
Status: DISCONTINUED | OUTPATIENT
Start: 2018-11-04 | End: 2018-11-05 | Stop reason: ALTCHOICE

## 2018-11-04 RX ORDER — ONDANSETRON 2 MG/ML
4 INJECTION INTRAMUSCULAR; INTRAVENOUS ONCE
Status: COMPLETED | OUTPATIENT
Start: 2018-11-04 | End: 2018-11-04

## 2018-11-04 RX ORDER — OXYCODONE HYDROCHLORIDE 5 MG/1
5 TABLET ORAL
Status: DISCONTINUED | OUTPATIENT
Start: 2018-11-04 | End: 2018-11-05 | Stop reason: ALTCHOICE

## 2018-11-04 RX ORDER — LEVOTHYROXINE SODIUM 0.07 MG/1
150 TABLET ORAL
Status: DISCONTINUED | OUTPATIENT
Start: 2018-11-05 | End: 2018-11-06

## 2018-11-04 RX ORDER — MORPHINE SULFATE 4 MG/ML
2 INJECTION, SOLUTION INTRAMUSCULAR; INTRAVENOUS
Status: DISCONTINUED | OUTPATIENT
Start: 2018-11-04 | End: 2018-11-05 | Stop reason: ALTCHOICE

## 2018-11-04 RX ORDER — ACETAMINOPHEN 325 MG/1
650 TABLET ORAL EVERY 6 HOURS PRN
Status: DISCONTINUED | OUTPATIENT
Start: 2018-11-04 | End: 2018-11-08 | Stop reason: HOSPADM

## 2018-11-04 RX ORDER — OXYCODONE HYDROCHLORIDE 5 MG/1
2.5 TABLET ORAL
Status: DISCONTINUED | OUTPATIENT
Start: 2018-11-04 | End: 2018-11-05 | Stop reason: ALTCHOICE

## 2018-11-04 RX ORDER — AMOXICILLIN 250 MG
2 CAPSULE ORAL 2 TIMES DAILY
Status: DISCONTINUED | OUTPATIENT
Start: 2018-11-04 | End: 2018-11-05 | Stop reason: ALTCHOICE

## 2018-11-04 RX ORDER — MORPHINE SULFATE 4 MG/ML
3 INJECTION, SOLUTION INTRAMUSCULAR; INTRAVENOUS ONCE
Status: COMPLETED | OUTPATIENT
Start: 2018-11-04 | End: 2018-11-04

## 2018-11-04 RX ORDER — ONDANSETRON 2 MG/ML
4 INJECTION INTRAMUSCULAR; INTRAVENOUS EVERY 4 HOURS PRN
Status: DISCONTINUED | OUTPATIENT
Start: 2018-11-04 | End: 2018-11-05

## 2018-11-04 RX ORDER — BISACODYL 10 MG
10 SUPPOSITORY, RECTAL RECTAL
Status: DISCONTINUED | OUTPATIENT
Start: 2018-11-04 | End: 2018-11-05 | Stop reason: ALTCHOICE

## 2018-11-04 RX ORDER — ONDANSETRON 4 MG/1
4 TABLET, ORALLY DISINTEGRATING ORAL EVERY 4 HOURS PRN
Status: DISCONTINUED | OUTPATIENT
Start: 2018-11-04 | End: 2018-11-08 | Stop reason: HOSPADM

## 2018-11-04 RX ORDER — SODIUM CHLORIDE 9 MG/ML
INJECTION, SOLUTION INTRAVENOUS CONTINUOUS
Status: DISCONTINUED | OUTPATIENT
Start: 2018-11-04 | End: 2018-11-06

## 2018-11-04 RX ADMIN — MORPHINE SULFATE 3 MG: 4 INJECTION INTRAVENOUS at 15:25

## 2018-11-04 RX ADMIN — ONDANSETRON 4 MG: 2 INJECTION INTRAMUSCULAR; INTRAVENOUS at 15:32

## 2018-11-04 RX ADMIN — STANDARDIZED SENNA CONCENTRATE AND DOCUSATE SODIUM 2 TABLET: 8.6; 5 TABLET, FILM COATED ORAL at 21:59

## 2018-11-04 RX ADMIN — SODIUM CHLORIDE: 9 INJECTION, SOLUTION INTRAVENOUS at 21:59

## 2018-11-04 ASSESSMENT — PAIN SCALES - GENERAL
PAINLEVEL_OUTOF10: 7
PAINLEVEL_OUTOF10: 10
PAINLEVEL_OUTOF10: 5

## 2018-11-04 ASSESSMENT — ENCOUNTER SYMPTOMS
SORE THROAT: 0
ABDOMINAL PAIN: 0
NERVOUS/ANXIOUS: 0
WEAKNESS: 0
COUGH: 0
NAUSEA: 0
WHEEZING: 0
HEARTBURN: 0
CHILLS: 0
DIZZINESS: 0
FEVER: 0
BLURRED VISION: 0
DIARRHEA: 0
HEADACHES: 0
PALPITATIONS: 0
SHORTNESS OF BREATH: 0
VOMITING: 0

## 2018-11-04 ASSESSMENT — COGNITIVE AND FUNCTIONAL STATUS - GENERAL
DAILY ACTIVITIY SCORE: 15
EATING MEALS: A LITTLE
SUGGESTED CMS G CODE MODIFIER MOBILITY: CL
STANDING UP FROM CHAIR USING ARMS: A LOT
HELP NEEDED FOR BATHING: A LOT
MOVING FROM LYING ON BACK TO SITTING ON SIDE OF FLAT BED: A LOT
WALKING IN HOSPITAL ROOM: A LOT
PERSONAL GROOMING: A LITTLE
SUGGESTED CMS G CODE MODIFIER DAILY ACTIVITY: CK
TOILETING: A LOT
MOVING TO AND FROM BED TO CHAIR: A LOT
CLIMB 3 TO 5 STEPS WITH RAILING: A LOT
TURNING FROM BACK TO SIDE WHILE IN FLAT BAD: A LITTLE
MOBILITY SCORE: 13
DRESSING REGULAR UPPER BODY CLOTHING: A LITTLE
DRESSING REGULAR LOWER BODY CLOTHING: A LOT

## 2018-11-04 ASSESSMENT — PATIENT HEALTH QUESTIONNAIRE - PHQ9
SUM OF ALL RESPONSES TO PHQ9 QUESTIONS 1 AND 2: 0
2. FEELING DOWN, DEPRESSED, IRRITABLE, OR HOPELESS: NOT AT ALL
1. LITTLE INTEREST OR PLEASURE IN DOING THINGS: NOT AT ALL

## 2018-11-04 NOTE — ED TRIAGE NOTES
Chief Complaint   Patient presents with   • T-5000 GLF     ambulates with cane, lost balance and fell on right hip   • Hip Pain     right hip, no deformity noted, R pedal pulse 1+     Patient bib EMS from home with above complaints.  PTA PIV placed, fentanyl 100 mcg administered for pain.      Patient A&O, VSS, reports taking eloquid BID, denies LOC or hitting head in fall.  Patient changed into gown, chart up for ERP.

## 2018-11-05 ENCOUNTER — TELEPHONE (OUTPATIENT)
Dept: CARDIOLOGY | Facility: MEDICAL CENTER | Age: 83
End: 2018-11-05

## 2018-11-05 ENCOUNTER — NON-PROVIDER VISIT (OUTPATIENT)
Dept: CARDIOLOGY | Facility: MEDICAL CENTER | Age: 83
End: 2018-11-05
Payer: MEDICARE

## 2018-11-05 ENCOUNTER — APPOINTMENT (OUTPATIENT)
Dept: RADIOLOGY | Facility: MEDICAL CENTER | Age: 83
DRG: 482 | End: 2018-11-05
Attending: ORTHOPAEDIC SURGERY
Payer: MEDICARE

## 2018-11-05 PROBLEM — E53.8 VITAMIN B12 DEFICIENCY: Status: ACTIVE | Noted: 2018-11-05

## 2018-11-05 LAB
25(OH)D3 SERPL-MCNC: 49 NG/ML (ref 30–100)
ANION GAP SERPL CALC-SCNC: 8 MMOL/L (ref 0–11.9)
BASOPHILS # BLD AUTO: 0.2 % (ref 0–1.8)
BASOPHILS # BLD: 0.01 K/UL (ref 0–0.12)
BUN SERPL-MCNC: 13 MG/DL (ref 8–22)
CALCIUM SERPL-MCNC: 8.1 MG/DL (ref 8.5–10.5)
CHLORIDE SERPL-SCNC: 110 MMOL/L (ref 96–112)
CO2 SERPL-SCNC: 22 MMOL/L (ref 20–33)
CREAT SERPL-MCNC: 0.54 MG/DL (ref 0.5–1.4)
EOSINOPHIL # BLD AUTO: 0.05 K/UL (ref 0–0.51)
EOSINOPHIL NFR BLD: 0.9 % (ref 0–6.9)
ERYTHROCYTE [DISTWIDTH] IN BLOOD BY AUTOMATED COUNT: 46.5 FL (ref 35.9–50)
GLUCOSE SERPL-MCNC: 99 MG/DL (ref 65–99)
HCT VFR BLD AUTO: 31 % (ref 37–47)
HGB BLD-MCNC: 10.2 G/DL (ref 12–16)
IMM GRANULOCYTES # BLD AUTO: 0.02 K/UL (ref 0–0.11)
IMM GRANULOCYTES NFR BLD AUTO: 0.4 % (ref 0–0.9)
LYMPHOCYTES # BLD AUTO: 1.05 K/UL (ref 1–4.8)
LYMPHOCYTES NFR BLD: 18.9 % (ref 22–41)
MCH RBC QN AUTO: 30.3 PG (ref 27–33)
MCHC RBC AUTO-ENTMCNC: 32.9 G/DL (ref 33.6–35)
MCV RBC AUTO: 92 FL (ref 81.4–97.8)
MONOCYTES # BLD AUTO: 0.53 K/UL (ref 0–0.85)
MONOCYTES NFR BLD AUTO: 9.5 % (ref 0–13.4)
NEUTROPHILS # BLD AUTO: 3.89 K/UL (ref 2–7.15)
NEUTROPHILS NFR BLD: 70.1 % (ref 44–72)
NRBC # BLD AUTO: 0 K/UL
NRBC BLD-RTO: 0 /100 WBC
PLATELET # BLD AUTO: 106 K/UL (ref 164–446)
PMV BLD AUTO: 11.7 FL (ref 9–12.9)
POTASSIUM SERPL-SCNC: 3.9 MMOL/L (ref 3.6–5.5)
RBC # BLD AUTO: 3.37 M/UL (ref 4.2–5.4)
SODIUM SERPL-SCNC: 140 MMOL/L (ref 135–145)
VIT B12 SERPL-MCNC: 365 PG/ML (ref 211–911)
WBC # BLD AUTO: 5.6 K/UL (ref 4.8–10.8)

## 2018-11-05 PROCEDURE — 160041 HCHG SURGERY MINUTES - EA ADDL 1 MIN LEVEL 4: Performed by: ORTHOPAEDIC SURGERY

## 2018-11-05 PROCEDURE — 82306 VITAMIN D 25 HYDROXY: CPT

## 2018-11-05 PROCEDURE — 700102 HCHG RX REV CODE 250 W/ 637 OVERRIDE(OP): Performed by: HOSPITALIST

## 2018-11-05 PROCEDURE — A9270 NON-COVERED ITEM OR SERVICE: HCPCS | Performed by: ORTHOPAEDIC SURGERY

## 2018-11-05 PROCEDURE — 99233 SBSQ HOSP IP/OBS HIGH 50: CPT | Performed by: HOSPITALIST

## 2018-11-05 PROCEDURE — 36415 COLL VENOUS BLD VENIPUNCTURE: CPT

## 2018-11-05 PROCEDURE — 700111 HCHG RX REV CODE 636 W/ 250 OVERRIDE (IP): Performed by: ORTHOPAEDIC SURGERY

## 2018-11-05 PROCEDURE — 160029 HCHG SURGERY MINUTES - 1ST 30 MINS LEVEL 4: Performed by: ORTHOPAEDIC SURGERY

## 2018-11-05 PROCEDURE — 160009 HCHG ANES TIME/MIN: Performed by: ORTHOPAEDIC SURGERY

## 2018-11-05 PROCEDURE — 160048 HCHG OR STATISTICAL LEVEL 1-5: Performed by: ORTHOPAEDIC SURGERY

## 2018-11-05 PROCEDURE — 700111 HCHG RX REV CODE 636 W/ 250 OVERRIDE (IP)

## 2018-11-05 PROCEDURE — C1713 ANCHOR/SCREW BN/BN,TIS/BN: HCPCS | Performed by: ORTHOPAEDIC SURGERY

## 2018-11-05 PROCEDURE — A9270 NON-COVERED ITEM OR SERVICE: HCPCS | Performed by: HOSPITALIST

## 2018-11-05 PROCEDURE — 0QS634Z REPOSITION RIGHT UPPER FEMUR WITH INTERNAL FIXATION DEVICE, PERCUTANEOUS APPROACH: ICD-10-PCS | Performed by: ORTHOPAEDIC SURGERY

## 2018-11-05 PROCEDURE — 80048 BASIC METABOLIC PNL TOTAL CA: CPT

## 2018-11-05 PROCEDURE — 770006 HCHG ROOM/CARE - MED/SURG/GYN SEMI*

## 2018-11-05 PROCEDURE — A6402 STERILE GAUZE <= 16 SQ IN: HCPCS | Performed by: ORTHOPAEDIC SURGERY

## 2018-11-05 PROCEDURE — 700105 HCHG RX REV CODE 258: Performed by: FAMILY MEDICINE

## 2018-11-05 PROCEDURE — 700102 HCHG RX REV CODE 250 W/ 637 OVERRIDE(OP): Performed by: ORTHOPAEDIC SURGERY

## 2018-11-05 PROCEDURE — 500891 HCHG PACK, ORTHO MAJOR: Performed by: ORTHOPAEDIC SURGERY

## 2018-11-05 PROCEDURE — 503036 HCHG GUIDE PIN,OIC: Performed by: ORTHOPAEDIC SURGERY

## 2018-11-05 PROCEDURE — 700112 HCHG RX REV CODE 229: Performed by: ORTHOPAEDIC SURGERY

## 2018-11-05 PROCEDURE — 73502 X-RAY EXAM HIP UNI 2-3 VIEWS: CPT | Mod: RT

## 2018-11-05 PROCEDURE — 501838 HCHG SUTURE GENERAL: Performed by: ORTHOPAEDIC SURGERY

## 2018-11-05 PROCEDURE — 160035 HCHG PACU - 1ST 60 MINS PHASE I: Performed by: ORTHOPAEDIC SURGERY

## 2018-11-05 PROCEDURE — 700111 HCHG RX REV CODE 636 W/ 250 OVERRIDE (IP): Performed by: FAMILY MEDICINE

## 2018-11-05 PROCEDURE — 85025 COMPLETE CBC W/AUTO DIFF WBC: CPT

## 2018-11-05 PROCEDURE — 82607 VITAMIN B-12: CPT

## 2018-11-05 PROCEDURE — 160002 HCHG RECOVERY MINUTES (STAT): Performed by: ORTHOPAEDIC SURGERY

## 2018-11-05 DEVICE — SCREW CANNNULATED 16MM THREAD 7.3MM X 90MM (3TX3=9): Type: IMPLANTABLE DEVICE | Site: HIP | Status: FUNCTIONAL

## 2018-11-05 DEVICE — SCREW CANNNULATED 16MM THREAD 7.3MM X 95MM (3TX3=9): Type: IMPLANTABLE DEVICE | Site: HIP | Status: FUNCTIONAL

## 2018-11-05 RX ORDER — OXYCODONE HCL 5 MG/5 ML
10 SOLUTION, ORAL ORAL
Status: DISCONTINUED | OUTPATIENT
Start: 2018-11-05 | End: 2018-11-05

## 2018-11-05 RX ORDER — DIPHENHYDRAMINE HYDROCHLORIDE 50 MG/ML
12.5 INJECTION INTRAMUSCULAR; INTRAVENOUS
Status: DISCONTINUED | OUTPATIENT
Start: 2018-11-05 | End: 2018-11-05

## 2018-11-05 RX ORDER — OXYCODONE HYDROCHLORIDE 5 MG/1
5 TABLET ORAL
Status: DISCONTINUED | OUTPATIENT
Start: 2018-11-05 | End: 2018-11-08 | Stop reason: HOSPADM

## 2018-11-05 RX ORDER — DEXAMETHASONE SODIUM PHOSPHATE 4 MG/ML
4 INJECTION, SOLUTION INTRA-ARTICULAR; INTRALESIONAL; INTRAMUSCULAR; INTRAVENOUS; SOFT TISSUE
Status: DISCONTINUED | OUTPATIENT
Start: 2018-11-05 | End: 2018-11-08 | Stop reason: HOSPADM

## 2018-11-05 RX ORDER — CHOLECALCIFEROL (VITAMIN D3) 125 MCG
1000 CAPSULE ORAL DAILY
Status: DISCONTINUED | OUTPATIENT
Start: 2018-11-06 | End: 2018-11-08 | Stop reason: HOSPADM

## 2018-11-05 RX ORDER — TRAMADOL HYDROCHLORIDE 50 MG/1
50 TABLET ORAL EVERY 4 HOURS PRN
Status: DISCONTINUED | OUTPATIENT
Start: 2018-11-05 | End: 2018-11-08 | Stop reason: HOSPADM

## 2018-11-05 RX ORDER — SCOLOPAMINE TRANSDERMAL SYSTEM 1 MG/1
1 PATCH, EXTENDED RELEASE TRANSDERMAL
Status: DISCONTINUED | OUTPATIENT
Start: 2018-11-05 | End: 2018-11-08 | Stop reason: HOSPADM

## 2018-11-05 RX ORDER — ONDANSETRON 2 MG/ML
4 INJECTION INTRAMUSCULAR; INTRAVENOUS
Status: DISCONTINUED | OUTPATIENT
Start: 2018-11-05 | End: 2018-11-05

## 2018-11-05 RX ORDER — OXYCODONE HCL 5 MG/5 ML
5 SOLUTION, ORAL ORAL
Status: DISCONTINUED | OUTPATIENT
Start: 2018-11-05 | End: 2018-11-05

## 2018-11-05 RX ORDER — CEFAZOLIN SODIUM 2 G/100ML
2 INJECTION, SOLUTION INTRAVENOUS EVERY 8 HOURS
Status: COMPLETED | OUTPATIENT
Start: 2018-11-05 | End: 2018-11-06

## 2018-11-05 RX ORDER — HYDROMORPHONE HYDROCHLORIDE 2 MG/ML
0.2 INJECTION, SOLUTION INTRAMUSCULAR; INTRAVENOUS; SUBCUTANEOUS
Status: DISCONTINUED | OUTPATIENT
Start: 2018-11-05 | End: 2018-11-05 | Stop reason: HOSPADM

## 2018-11-05 RX ORDER — OXYCODONE HYDROCHLORIDE 10 MG/1
10 TABLET ORAL
Status: DISCONTINUED | OUTPATIENT
Start: 2018-11-05 | End: 2018-11-08 | Stop reason: HOSPADM

## 2018-11-05 RX ORDER — BENZOCAINE/MENTHOL 6 MG-10 MG
LOZENGE MUCOUS MEMBRANE 2 TIMES DAILY
Status: DISCONTINUED | OUTPATIENT
Start: 2018-11-05 | End: 2018-11-08 | Stop reason: HOSPADM

## 2018-11-05 RX ORDER — MORPHINE SULFATE 10 MG/ML
5 INJECTION, SOLUTION INTRAMUSCULAR; INTRAVENOUS
Status: DISCONTINUED | OUTPATIENT
Start: 2018-11-05 | End: 2018-11-05 | Stop reason: HOSPADM

## 2018-11-05 RX ORDER — ONDANSETRON 2 MG/ML
4 INJECTION INTRAMUSCULAR; INTRAVENOUS
Status: DISCONTINUED | OUTPATIENT
Start: 2018-11-05 | End: 2018-11-05 | Stop reason: HOSPADM

## 2018-11-05 RX ORDER — MEPERIDINE HYDROCHLORIDE 25 MG/ML
6.25 INJECTION INTRAMUSCULAR; INTRAVENOUS; SUBCUTANEOUS
Status: DISCONTINUED | OUTPATIENT
Start: 2018-11-05 | End: 2018-11-05 | Stop reason: HOSPADM

## 2018-11-05 RX ORDER — BISACODYL 10 MG
10 SUPPOSITORY, RECTAL RECTAL
Status: DISCONTINUED | OUTPATIENT
Start: 2018-11-05 | End: 2018-11-08 | Stop reason: HOSPADM

## 2018-11-05 RX ORDER — AMOXICILLIN 250 MG
1 CAPSULE ORAL NIGHTLY
Status: DISCONTINUED | OUTPATIENT
Start: 2018-11-05 | End: 2018-11-08 | Stop reason: HOSPADM

## 2018-11-05 RX ORDER — DOCUSATE SODIUM 100 MG/1
100 CAPSULE, LIQUID FILLED ORAL 2 TIMES DAILY
Status: DISCONTINUED | OUTPATIENT
Start: 2018-11-05 | End: 2018-11-08 | Stop reason: HOSPADM

## 2018-11-05 RX ORDER — OXYCODONE HYDROCHLORIDE 5 MG/1
5 TABLET ORAL
Status: DISCONTINUED | OUTPATIENT
Start: 2018-11-05 | End: 2018-11-05 | Stop reason: HOSPADM

## 2018-11-05 RX ORDER — OXYCODONE HYDROCHLORIDE 5 MG/1
5 TABLET ORAL
Status: DISCONTINUED | OUTPATIENT
Start: 2018-11-05 | End: 2018-11-05

## 2018-11-05 RX ORDER — ONDANSETRON 2 MG/ML
4 INJECTION INTRAMUSCULAR; INTRAVENOUS EVERY 4 HOURS PRN
Status: DISCONTINUED | OUTPATIENT
Start: 2018-11-05 | End: 2018-11-08 | Stop reason: HOSPADM

## 2018-11-05 RX ORDER — CYANOCOBALAMIN 1000 UG/ML
1000 INJECTION, SOLUTION INTRAMUSCULAR; SUBCUTANEOUS ONCE
Status: ACTIVE | OUTPATIENT
Start: 2018-11-05 | End: 2018-11-06

## 2018-11-05 RX ORDER — SODIUM CHLORIDE, SODIUM LACTATE, POTASSIUM CHLORIDE, CALCIUM CHLORIDE 600; 310; 30; 20 MG/100ML; MG/100ML; MG/100ML; MG/100ML
INJECTION, SOLUTION INTRAVENOUS CONTINUOUS
Status: DISCONTINUED | OUTPATIENT
Start: 2018-11-05 | End: 2018-11-05

## 2018-11-05 RX ORDER — OXYCODONE HYDROCHLORIDE 5 MG/1
10 TABLET ORAL
Status: DISCONTINUED | OUTPATIENT
Start: 2018-11-05 | End: 2018-11-05

## 2018-11-05 RX ORDER — MORPHINE SULFATE 4 MG/ML
2 INJECTION, SOLUTION INTRAMUSCULAR; INTRAVENOUS
Status: DISCONTINUED | OUTPATIENT
Start: 2018-11-05 | End: 2018-11-05 | Stop reason: HOSPADM

## 2018-11-05 RX ORDER — HYDROMORPHONE HYDROCHLORIDE 2 MG/ML
0.1 INJECTION, SOLUTION INTRAMUSCULAR; INTRAVENOUS; SUBCUTANEOUS
Status: DISCONTINUED | OUTPATIENT
Start: 2018-11-05 | End: 2018-11-05 | Stop reason: HOSPADM

## 2018-11-05 RX ORDER — MEPERIDINE HYDROCHLORIDE 25 MG/ML
6.25 INJECTION INTRAMUSCULAR; INTRAVENOUS; SUBCUTANEOUS
Status: DISCONTINUED | OUTPATIENT
Start: 2018-11-05 | End: 2018-11-05

## 2018-11-05 RX ORDER — SODIUM CHLORIDE, SODIUM LACTATE, POTASSIUM CHLORIDE, CALCIUM CHLORIDE 600; 310; 30; 20 MG/100ML; MG/100ML; MG/100ML; MG/100ML
INJECTION, SOLUTION INTRAVENOUS CONTINUOUS
Status: DISCONTINUED | OUTPATIENT
Start: 2018-11-05 | End: 2018-11-05 | Stop reason: HOSPADM

## 2018-11-05 RX ORDER — AMOXICILLIN 250 MG
1 CAPSULE ORAL
Status: DISCONTINUED | OUTPATIENT
Start: 2018-11-05 | End: 2018-11-08 | Stop reason: HOSPADM

## 2018-11-05 RX ORDER — ENEMA 19; 7 G/133ML; G/133ML
1 ENEMA RECTAL
Status: DISCONTINUED | OUTPATIENT
Start: 2018-11-05 | End: 2018-11-08 | Stop reason: HOSPADM

## 2018-11-05 RX ORDER — HYDROMORPHONE HYDROCHLORIDE 2 MG/ML
0.4 INJECTION, SOLUTION INTRAMUSCULAR; INTRAVENOUS; SUBCUTANEOUS
Status: DISCONTINUED | OUTPATIENT
Start: 2018-11-05 | End: 2018-11-05 | Stop reason: HOSPADM

## 2018-11-05 RX ORDER — HYDROMORPHONE HYDROCHLORIDE 1 MG/ML
0.5 INJECTION, SOLUTION INTRAMUSCULAR; INTRAVENOUS; SUBCUTANEOUS
Status: DISCONTINUED | OUTPATIENT
Start: 2018-11-05 | End: 2018-11-08 | Stop reason: HOSPADM

## 2018-11-05 RX ORDER — OXYCODONE HCL 5 MG/5 ML
10 SOLUTION, ORAL ORAL
Status: DISCONTINUED | OUTPATIENT
Start: 2018-11-05 | End: 2018-11-05 | Stop reason: HOSPADM

## 2018-11-05 RX ORDER — DIPHENHYDRAMINE HYDROCHLORIDE 50 MG/ML
25 INJECTION INTRAMUSCULAR; INTRAVENOUS EVERY 6 HOURS PRN
Status: DISCONTINUED | OUTPATIENT
Start: 2018-11-05 | End: 2018-11-06

## 2018-11-05 RX ORDER — OXYCODONE HCL 5 MG/5 ML
5 SOLUTION, ORAL ORAL
Status: DISCONTINUED | OUTPATIENT
Start: 2018-11-05 | End: 2018-11-05 | Stop reason: HOSPADM

## 2018-11-05 RX ORDER — POLYETHYLENE GLYCOL 3350 17 G/17G
1 POWDER, FOR SOLUTION ORAL 2 TIMES DAILY PRN
Status: DISCONTINUED | OUTPATIENT
Start: 2018-11-05 | End: 2018-11-08 | Stop reason: HOSPADM

## 2018-11-05 RX ORDER — HALOPERIDOL 5 MG/ML
1 INJECTION INTRAMUSCULAR EVERY 6 HOURS PRN
Status: DISCONTINUED | OUTPATIENT
Start: 2018-11-05 | End: 2018-11-08 | Stop reason: HOSPADM

## 2018-11-05 RX ORDER — ACETAMINOPHEN 325 MG/1
650 TABLET ORAL EVERY 6 HOURS
Status: DISCONTINUED | OUTPATIENT
Start: 2018-11-05 | End: 2018-11-08 | Stop reason: HOSPADM

## 2018-11-05 RX ORDER — MORPHINE SULFATE 4 MG/ML
1 INJECTION, SOLUTION INTRAMUSCULAR; INTRAVENOUS
Status: DISCONTINUED | OUTPATIENT
Start: 2018-11-05 | End: 2018-11-05 | Stop reason: HOSPADM

## 2018-11-05 RX ORDER — OXYCODONE HYDROCHLORIDE 5 MG/1
10 TABLET ORAL
Status: DISCONTINUED | OUTPATIENT
Start: 2018-11-05 | End: 2018-11-05 | Stop reason: HOSPADM

## 2018-11-05 RX ORDER — DIPHENHYDRAMINE HYDROCHLORIDE 50 MG/ML
12.5 INJECTION INTRAMUSCULAR; INTRAVENOUS
Status: DISCONTINUED | OUTPATIENT
Start: 2018-11-05 | End: 2018-11-05 | Stop reason: HOSPADM

## 2018-11-05 RX ADMIN — CEFAZOLIN SODIUM 2 G: 2 INJECTION, SOLUTION INTRAVENOUS at 17:00

## 2018-11-05 RX ADMIN — MORPHINE SULFATE 1 MG: 4 INJECTION, SOLUTION INTRAMUSCULAR; INTRAVENOUS at 12:45

## 2018-11-05 RX ADMIN — ACETAMINOPHEN 650 MG: 325 TABLET, FILM COATED ORAL at 17:00

## 2018-11-05 RX ADMIN — SODIUM CHLORIDE: 9 INJECTION, SOLUTION INTRAVENOUS at 17:00

## 2018-11-05 RX ADMIN — MORPHINE SULFATE 2 MG: 4 INJECTION INTRAVENOUS at 09:26

## 2018-11-05 RX ADMIN — MORPHINE SULFATE 1 MG: 4 INJECTION, SOLUTION INTRAMUSCULAR; INTRAVENOUS at 12:55

## 2018-11-05 RX ADMIN — DOCUSATE SODIUM 100 MG: 100 CAPSULE, LIQUID FILLED ORAL at 17:00

## 2018-11-05 RX ADMIN — DIPHENHYDRAMINE HYDROCHLORIDE 25 MG: 50 INJECTION, SOLUTION INTRAMUSCULAR; INTRAVENOUS at 19:28

## 2018-11-05 RX ADMIN — STANDARDIZED SENNA CONCENTRATE AND DOCUSATE SODIUM 1 TABLET: 8.6; 5 TABLET, FILM COATED ORAL at 19:29

## 2018-11-05 RX ADMIN — HYDROCORTISONE: 10 CREAM TOPICAL at 18:00

## 2018-11-05 RX ADMIN — OXYCODONE HYDROCHLORIDE 10 MG: 10 TABLET ORAL at 17:00

## 2018-11-05 RX ADMIN — ACETAMINOPHEN 650 MG: 325 TABLET, FILM COATED ORAL at 23:44

## 2018-11-05 ASSESSMENT — ENCOUNTER SYMPTOMS
FEVER: 0
WHEEZING: 0
NAUSEA: 0
COUGH: 0
CONSTIPATION: 0
DIARRHEA: 0
HEADACHES: 0
SHORTNESS OF BREATH: 0
CHILLS: 0
VOMITING: 0

## 2018-11-05 ASSESSMENT — PAIN SCALES - GENERAL
PAINLEVEL_OUTOF10: 3
PAINLEVEL_OUTOF10: 10
PAINLEVEL_OUTOF10: 5
PAINLEVEL_OUTOF10: 6
PAINLEVEL_OUTOF10: 3
PAINLEVEL_OUTOF10: 9
PAINLEVEL_OUTOF10: 5
PAINLEVEL_OUTOF10: 5
PAINLEVEL_OUTOF10: 4
PAINLEVEL_OUTOF10: 7

## 2018-11-05 NOTE — PROGRESS NOTES
[facility] Medicine Daily Progress Note    Date of Service  11/5/2018    Chief Complaint  89 y.o. female admitted 11/4/2018 with right femoral neck fracture    Interval Problem Update  11/5: Repleting vitamin B12.  Checking free T4.  Added hydrocortisone cream for left arm itching.  Pending surgical repair of hip fracture.  Resuming Eliquis. referred to SNF.    Disposition  Pending surgical repair of hip fracture.  Referred to SNF.    Review of Systems  Review of Systems   Constitutional: Negative for chills and fever.   Respiratory: Negative for cough, shortness of breath and wheezing.    Cardiovascular: Negative for chest pain.   Gastrointestinal: Negative for constipation, diarrhea, nausea and vomiting.   Genitourinary: Negative for dysuria.   Musculoskeletal: Positive for joint pain.   Skin: Positive for itching.   Neurological: Negative for headaches.        Physical Exam  Temp:  [36.7 °C (98 °F)-37.3 °C (99.1 °F)] 36.7 °C (98 °F)  Pulse:  [] 68  Resp:  [15-19] 18  BP: (127-172)/() 144/74    Physical Exam   Constitutional: She appears well-developed.   HENT:   Head: Normocephalic.   Eyes: Conjunctivae are normal.   Cardiovascular: Normal rate.  Exam reveals no gallop.    Pulmonary/Chest: No respiratory distress. She has no wheezes.   Abdominal: She exhibits no distension. There is no tenderness.   Musculoskeletal: She exhibits tenderness.   Neurological: She is alert.   Skin: Rash (Very mild on left arm) noted.       Fluids  No intake or output data in the 24 hours ending 11/05/18 1038    Laboratory  Recent Labs      11/04/18   1533  11/04/18   1641  11/05/18   0403   WBC  RR  7.9  5.6   RBC  RR  3.82*  3.37*   HEMOGLOBIN  RR  11.4*  10.2*   HEMATOCRIT  RR  35.8*  31.0*   MCV  RR  93.7  92.0   MCH  RR  29.8  30.3   MCHC  RR  31.8*  32.9*   RDW  RR  47.5  46.5   PLATELETCT  RR  133*  106*   MPV  RR  11.4  11.7     Recent Labs      11/04/18   1533  11/04/18   1641  11/05/18   0403   SODIUM  RR  142  140    POTASSIUM  RR  3.9  3.9   CHLORIDE  RR  110  110   CO2  RR  24  22   GLUCOSE  RR  95  99   BUN  RR  16  13   CREATININE  RR  0.60  0.54   CALCIUM  RR  9.0  8.1*     Recent Labs      11/04/18   1533  11/04/18   1641   APTT  RR  25.4   INR  RR  1.07               Imaging  DX-KNEE 2- RIGHT   Final Result      No evidence of fracture or dislocation.   Mild osteoarthritis.      DX-CHEST-PORTABLE (1 VIEW)   Final Result         No acute cardiac or pulmonary abnormality is identified.      CT-PELVIS W/O   Final Result      1.  Impacted acute versus subacute fracture of the subcapital right femoral neck does appear to be present.      2.  Left hip arthroplasty noted in place.      DX-PELVIS-1 OR 2 VIEWS   Final Result      1.  No acute fracture or dislocation is identified.   2.  Osteopenia and mild osteoarthritis.      DX-FEMUR-2+ RIGHT   Final Result      No radiographic evidence of acute traumatic injury.           Assessment/Plan  * Closed fracture of neck of right femur (HCC)- (present on admission)   Assessment & Plan    Orthopedic surgery consulted  Pain control  Vitamin D 49     Frequent falls- (present on admission)   Assessment & Plan    PT and OT evaluation  Vitamin D 49     PAF (paroxysmal atrial fibrillation) (HCC)- (present on admission)   Assessment & Plan    Resuming Eliquis after surgery     History of CVA (cerebrovascular accident)- (present on admission)   Assessment & Plan    Resume Eliquis     Hypertension- (present on admission)   Assessment & Plan    Monitor for now     Vitamin B12 deficiency- (present on admission)   Assessment & Plan    -B12 level was low normal at 365  -Likely has underlying deficiency  -Repleting with IM followed by oral cyanocobalamin     Hypothyroidism- (present on admission)   Assessment & Plan    Continue Synthroid  TSH low at 0.010  Free T4 pending          VTE prophylaxis: Apixaban

## 2018-11-05 NOTE — ASSESSMENT & PLAN NOTE
Orthopedic surgery consulted, s/p  Closed reduction percutaneous pinning, right femoral neck POD#2, doing well, no adjustments needed, DVT px is eliquis  Pain control  Vitamin D 49

## 2018-11-05 NOTE — OP REPORT
DATE OF SERVICE:  11/05/2018    PREOPERATIVE DIAGNOSIS:  Right femoral neck fracture.    POSTOPERATIVE DIAGNOSIS:  Right femoral neck fracture.    PROCEDURE PERFORMED:  Closed reduction percutaneous pinning, right femoral   neck.    SURGEON:  Ej Randhawa MD    ASSISTANT:  Josafat Alexandre DO    ESTIMATED BLOOD LOSS:  None.    INDICATIONS:  This is a 89-year-old female status post fall during which she   sustained a right hip fracture.  Risks and benefits of percutaneous pin   fixation were discussed, which include but not limited to bleeding, infection,   neurovascular damage, pain, stiffness, malunion, nonunion, DVT, PE, MI,   stroke, and death.  She understands all these risks and wished to proceed.    DESCRIPTION OF PROCEDURE:  The patient was sedated with LMA anesthesia and   administered preoperative antibiotics.  She was placed on the fracture table   and the fracture reduced with slight traction and internal rotation.  Right   hip was prepped and draped in the usual sterile fashion and 3 guide pins for   OIC 7.0 cannulated screws were inserted in an inverted triangular fashion   according to standardized techniques.  Bite was obtained in all screws and all   hardware checked and found to be extraarticular.  Wounds were irrigated,   closed with staples.  Sterile dressings were applied.  The patient tolerated   the procedure well.    POSTOPERATIVE PLAN:  The patient will be weightbearing as tolerated, admitted   for perioperative antibiotics, DVT prophylaxis, and pain control.       ____________________________________     EJ RANDHAWA MD PLA / N[facility]    DD:  11/05/2018 12:26:43  DT:  11/05/2018 12:38:55    D#:  [id number]  Job#:  [id number]

## 2018-11-05 NOTE — ASSESSMENT & PLAN NOTE
Resumed Eliquis after surgery, tolerating well, no e/o overt bleeding noted again today  -HR is well controlled at this time

## 2018-11-05 NOTE — ED NOTES
PIV placed, blood sent to lab; RN spoke with  will rerun lab orders and removed previously released lab results due to possible contamination.

## 2018-11-05 NOTE — H&P
Hospital Medicine History & Physical Note    Date of Service  11/4/2018    Primary Care Physician  Caitlin Rojas D.O.    Consultants  Orthopedic surgery consulted    Code Status  DNR/DNI    Chief Complaint  Right hip pain    History of Presenting Illness  89 y.o. female who presented 11/4/2018 with right hip pain after a fall today.  Patient states she was walking from the kitchen to the dining room using her cane when she stumbled and fell on her right side.  She had sudden onset of right hip pain.  Patient denies having any dizziness or lightheadedness prior to the fall, she denies having any chest pain or palpitation shortness of breath.  She denies any recent illness such as fever chills, cough or congestion, abdominal pain nausea vomiting diarrhea or dysuria.  Orthopedic surgery has been consulted on the case.    Review of Systems  Review of Systems   Constitutional: Negative for chills, fever and malaise/fatigue.   HENT: Negative for hearing loss and sore throat.    Eyes: Negative for blurred vision.   Respiratory: Negative for cough, shortness of breath and wheezing.    Cardiovascular: Negative for chest pain, palpitations and leg swelling.   Gastrointestinal: Negative for abdominal pain, diarrhea, heartburn, nausea and vomiting.   Genitourinary: Negative for dysuria.   Musculoskeletal: Positive for joint pain.   Skin: Negative for rash.   Neurological: Negative for dizziness, weakness and headaches.   Psychiatric/Behavioral: The patient is not nervous/anxious.        Past Medical History   has a past medical history of Arthritis; Bronchitis; Bruxism; CATARACT; Chickenpox; COPD (chronic obstructive pulmonary disease) (HCC); Fibromyalgia; Iraqi measles; Hyperlipidemia; Pericarditis (1960s); Pneumonia; Restless leg syndrome; Sleep apnea; Snoring; and Unspecified disorder of thyroid.    Surgical History   has a past surgical history that includes appendectomy; thyroidectomy; other; tonsillectomy and  adenoidectomy; femur orif (5/22/2013); cataract phaco with iol (12/11/2013); pr remv 2nd cataract,corn-scler sectn; and hip replacement, total.     Family History  family history includes Cancer in her sister; Heart Disease in her son; Psychiatry in her father.     Social History   reports that she quit smoking about 57 years ago. Her smoking use included Cigarettes. She has a 20.00 pack-year smoking history. She has never used smokeless tobacco. She reports that she drinks alcohol. She reports that she does not use drugs.    Allergies  Allergies   Allergen Reactions   • Other Environmental Shortness of Breath     Smoke, perfume       Medications  Prior to Admission Medications   Prescriptions Last Dose Informant Patient Reported? Taking?   Clobetasol Propionate 0.05 % Lotion 11/4/2018 at Unknown time  No No   Sig: Apply twice daily to affected areas   apixaban (ELIQUIS) 2.5mg Tab 11/3/2018 at Unknown time  No No   Sig: Take 1 Tab by mouth 2 Times a Day.   hydrOXYzine HCl (ATARAX) 25 MG Tab 11/4/2018 at Unknown time  No No   Sig: Take 1 Tab by mouth 1 time daily as needed for Itching.   levothyroxine (SYNTHROID) 150 MCG Tab 11/4/2018 at Unknown time  No No   Sig: Take 1 Tab by mouth Every morning on an empty stomach.   polyethylene glycol 3350 (MIRALAX) Powder 11/4/2018 at Unknown time  Yes No   Sig: Take 17 g by mouth every day.      Facility-Administered Medications: None       Physical Exam  Temp:  [36.7 °C (98 °F)] 36.7 °C (98 °F)  Pulse:  [65-78] 65  Resp:  [16] 16  BP: (127-160)/(58-90) 160/58    Physical Exam   Constitutional: She is oriented to person, place, and time. She appears well-developed.   HENT:   Head: Normocephalic and atraumatic.   Eyes: Pupils are equal, round, and reactive to light. Conjunctivae are normal.   Neck: No tracheal deviation present. No thyromegaly present.   Cardiovascular: Normal rate and regular rhythm.    Pulmonary/Chest: Effort normal and breath sounds normal.   Abdominal:  Soft. Bowel sounds are normal. She exhibits no distension. There is no tenderness.   Musculoskeletal: She exhibits no edema.   Lymphadenopathy:     She has no cervical adenopathy.   Neurological: She is alert and oriented to person, place, and time.   Skin: Skin is warm and dry.   Nursing note and vitals reviewed.      Laboratory:  Recent Labs      11/04/18   1533  11/04/18   1641   WBC  RR  7.9   RBC  RR  3.82*   HEMOGLOBIN  RR  11.4*   HEMATOCRIT  RR  35.8*   MCV  RR  93.7   MCH  RR  29.8   MCHC  RR  31.8*   RDW  RR  47.5   PLATELETCT  RR  133*   MPV  RR  11.4     Recent Labs      11/04/18   1533  11/04/18   1641   SODIUM  RR  142   POTASSIUM  RR  3.9   CHLORIDE  RR  110   CO2  RR  24   GLUCOSE  RR  95   BUN  RR  16   CREATININE  RR  0.60   CALCIUM  RR  9.0     Recent Labs      11/04/18   1533  11/04/18   1641   ALTSGPT  RR  13   ASTSGOT  RR  27   ALKPHOSPHAT  RR  42   TBILIRUBIN  RR  0.6   GLUCOSE  RR  95     Recent Labs      11/04/18   1533  11/04/18   1641   APTT  RR  25.4   INR  RR  1.07             No results for input(s): TROPONINI in the last 72 hours.    Urinalysis:    No results found     Imaging:  DX-KNEE 2- RIGHT   Final Result      No evidence of fracture or dislocation.   Mild osteoarthritis.      DX-CHEST-PORTABLE (1 VIEW)   Final Result         No acute cardiac or pulmonary abnormality is identified.      CT-PELVIS W/O   Final Result      1.  Impacted acute versus subacute fracture of the subcapital right femoral neck does appear to be present.      2.  Left hip arthroplasty noted in place.      DX-PELVIS-1 OR 2 VIEWS   Final Result      1.  No acute fracture or dislocation is identified.   2.  Osteopenia and mild osteoarthritis.      DX-FEMUR-2+ RIGHT   Final Result      No radiographic evidence of acute traumatic injury.            Assessment/Plan:  I anticipate this patient will require at least two midnights for appropriate medical management, necessitating inpatient admission.    * Closed  fracture of neck of right femur (HCC)- (present on admission)   Assessment & Plan    Orthopedic surgery consulted  Pain control  Check vitamin D level     Frequent falls- (present on admission)   Assessment & Plan    PT and OT evaluation  Check vitamin D level and vitamin B12     PAF (paroxysmal atrial fibrillation) (HCC)- (present on admission)   Assessment & Plan    Hold Eliquis     History of CVA (cerebrovascular accident)- (present on admission)   Assessment & Plan    Hold Eliquis     Hypertension- (present on admission)   Assessment & Plan    Monitor for now     Hypothyroidism- (present on admission)   Assessment & Plan    Continue Synthroid, check TSH         VTE prophylaxis: Lovenox

## 2018-11-05 NOTE — TELEPHONE ENCOUNTER
Fyi: remote transmission today:   PAF episode on 11/2/2018  4 Mins. At   6:06pm  Most recent report to be scanned for review.

## 2018-11-05 NOTE — ED NOTES
Floor called and notified of transport, report to KIP Stevenson.  Afshan Love transported to [facility] via gurney with transport. All personal belongings in possession including 2 hearing aids placed in cup into purse.  NAD noted.

## 2018-11-05 NOTE — ASSESSMENT & PLAN NOTE
-B12 level was low normal at 365  -Likely has underlying deficiency  -Repleting with IM followed by oral cyanocobalamin

## 2018-11-05 NOTE — ED NOTES
Per ERP L hip pinning tomorrow, patient educated on NPO status after midnight tonight, patient verbalized understanding.

## 2018-11-05 NOTE — CONSULTS
11/5/2018    Afshan Love is a 89 y.o. female who presents after a fall with a right hip fracture and is here for operative management. Patient denies numbness, parasthesias, loss of concousness or other trauma    Past Medical History:   Diagnosis Date   • Arthritis    • Bronchitis    • Bruxism    • CATARACT    • Chickenpox    • COPD (chronic obstructive pulmonary disease) (HCC)    • Fibromyalgia    • Guamanian measles    • Hyperlipidemia    • Pericarditis 1960s   • Pneumonia    • Restless leg syndrome    • Sleep apnea     stopped cpap due to sinus problems   • Snoring     sleep study done   • Unspecified disorder of thyroid        Past Surgical History:   Procedure Laterality Date   • CATARACT PHACO WITH IOL  12/11/2013    Performed by Oma Dockery M.D. at SURGERY SAME DAY [facility] ORS   • FEMUR ORIF  5/22/2013    Performed by Johan Tsai M.D. at SURGERY [facility] ORS   • APPENDECTOMY     • HIP REPLACEMENT, TOTAL     • OTHER      eyelids fixed drooped below pupil   • PB REMV 2ND CATARACT,CORN-SCLER SECTN     • THYROIDECTOMY     • TONSILLECTOMY AND ADENOIDECTOMY         Medications  No current facility-administered medications on file prior to encounter.      Current Outpatient Prescriptions on File Prior to Encounter   Medication Sig Dispense Refill   • Clobetasol Propionate 0.05 % Lotion Apply twice daily to affected areas 1 Bottle 1   • hydrOXYzine HCl (ATARAX) 25 MG Tab Take 1 Tab by mouth 1 time daily as needed for Itching. 30 Tab 1   • levothyroxine (SYNTHROID) 150 MCG Tab Take 1 Tab by mouth Every morning on an empty stomach. 90 Tab 1   • apixaban (ELIQUIS) 2.5mg Tab Take 1 Tab by mouth 2 Times a Day. 60 Tab 0   • polyethylene glycol 3350 (MIRALAX) Powder Take 17 g by mouth every day.         Allergies  Other environmental    ROS  Right hip pain. All other systems were reviewed and found to be negative    Family History   Problem Relation Age of Onset   • Cancer Sister    • Psychiatry Father        "  suicide   • Heart Disease Son         extra heart beat   • Sleep Apnea Neg Hx        Social History     Social History   • Marital status:      Spouse name: N/A   • Number of children: N/A   • Years of education: N/A     Social History Main Topics   • Smoking status: Former Smoker     Packs/day: 1.00     Years: 20.00     Types: Cigarettes     Quit date: 5/21/1961   • Smokeless tobacco: Never Used   • Alcohol use Yes      Comment: 1 PER MO   • Drug use: No      Comment: had 1/2 of Edible marijuana cookie once   • Sexual activity: Not Currently     Other Topics Concern   • Not on file     Social History Narrative   • No narrative on file       Physical Exam  Vitals  Blood pressure 145/70, pulse 73, temperature 37.2 °C (98.9 °F), resp. rate 15, height 1.626 m (5' 4\"), weight 55 kg (121 lb 4.1 oz), SpO2 100 %, not currently breastfeeding.  General: Well Developed, Well Nourished, no acute distress  HEENT: Normocephalic, atraumatic  Eyes: Anicteric, PERRLA, EOMI  Neck: Supple, nontender, no masses  Lungs: CTA, no wheezes or crackles  Heart: RRR, no murmurs, rubs or gallops  Abdomen: Soft, NT, ND  Pelvis: Stable to AP and Lateral Compression  Skin: Intact, no open wounds  Extremities: Right hip pain, LLE  Neuro: NVI  Vascular: 2+DP/PT, Capillary refill <2 seconds    Radiographs:  DX-KNEE 2- RIGHT   Final Result      No evidence of fracture or dislocation.   Mild osteoarthritis.      DX-CHEST-PORTABLE (1 VIEW)   Final Result         No acute cardiac or pulmonary abnormality is identified.      CT-PELVIS W/O   Final Result      1.  Impacted acute versus subacute fracture of the subcapital right femoral neck does appear to be present.      2.  Left hip arthroplasty noted in place.      DX-PELVIS-1 OR 2 VIEWS   Final Result      1.  No acute fracture or dislocation is identified.   2.  Osteopenia and mild osteoarthritis.      DX-FEMUR-2+ RIGHT   Final Result      No radiographic evidence of acute traumatic injury.    "       Laboratory Values  Recent Labs      11/04/18   1533  11/04/18   1641  11/05/18   0403   WBC  RR  7.9  5.6   RBC  RR  3.82*  3.37*   HEMOGLOBIN  RR  11.4*  10.2*   HEMATOCRIT  RR  35.8*  31.0*   MCV  RR  93.7  92.0   MCH  RR  29.8  30.3   MCHC  RR  31.8*  32.9*   RDW  RR  47.5  46.5   PLATELETCT  RR  133*  106*   MPV  RR  11.4  11.7     Recent Labs      11/04/18   1533  11/04/18   1641  11/05/18   0403   SODIUM  RR  142  140   POTASSIUM  RR  3.9  3.9   CHLORIDE  RR  110  110   CO2  RR  24  22   GLUCOSE  RR  95  99   BUN  RR  16  13     Recent Labs      11/04/18   1533  11/04/18   1641   APTT  RR  25.4   INR  RR  1.07         Impression: Right femoral neck fracture    Plan:Operative intervention. Risks and benefits of surgery were discussed which include but are not limited to bleeding, infection, neurovascular damage, malunion, nonunion, instability, limb length discrepancy, DVT, PE, MI, Stroke and death. They understand these risks and wish to proceed.

## 2018-11-05 NOTE — PROGRESS NOTES
2 RN skin check complete with KIP Alexis charge. Skin intact. Warm, dry. No open wounds. Sacrum red, blanchable.

## 2018-11-05 NOTE — ED NOTES
Critical lab results received from  at 1624:  K+ 1.3  CO@ 6  Glucose 32  Ca+ <4.    ERP made aware at 1624, new order received to redraw labs.      Patient A&O, VSS, SR on monitor, patient resting on gurney, respirations even and unlabored.

## 2018-11-06 PROBLEM — D69.6 THROMBOCYTOPENIA (HCC): Status: ACTIVE | Noted: 2018-11-06

## 2018-11-06 PROBLEM — L29.9 ITCHY SKIN: Status: ACTIVE | Noted: 2018-11-06

## 2018-11-06 LAB
ALBUMIN SERPL BCP-MCNC: 2.8 G/DL (ref 3.2–4.9)
BASOPHILS # BLD AUTO: 0.2 % (ref 0–1.8)
BASOPHILS # BLD: 0.01 K/UL (ref 0–0.12)
BUN SERPL-MCNC: 14 MG/DL (ref 8–22)
CALCIUM SERPL-MCNC: 8 MG/DL (ref 8.5–10.5)
CHLORIDE SERPL-SCNC: 107 MMOL/L (ref 96–112)
CO2 SERPL-SCNC: 27 MMOL/L (ref 20–33)
CREAT SERPL-MCNC: 0.71 MG/DL (ref 0.5–1.4)
EOSINOPHIL # BLD AUTO: 0.05 K/UL (ref 0–0.51)
EOSINOPHIL NFR BLD: 0.8 % (ref 0–6.9)
ERYTHROCYTE [DISTWIDTH] IN BLOOD BY AUTOMATED COUNT: 47.8 FL (ref 35.9–50)
GLUCOSE SERPL-MCNC: 125 MG/DL (ref 65–99)
HCT VFR BLD AUTO: 29.7 % (ref 37–47)
HGB BLD-MCNC: 9.6 G/DL (ref 12–16)
IMM GRANULOCYTES # BLD AUTO: 0.02 K/UL (ref 0–0.11)
IMM GRANULOCYTES NFR BLD AUTO: 0.3 % (ref 0–0.9)
LYMPHOCYTES # BLD AUTO: 1.13 K/UL (ref 1–4.8)
LYMPHOCYTES NFR BLD: 19.1 % (ref 22–41)
MCH RBC QN AUTO: 30.4 PG (ref 27–33)
MCHC RBC AUTO-ENTMCNC: 32.3 G/DL (ref 33.6–35)
MCV RBC AUTO: 94 FL (ref 81.4–97.8)
MONOCYTES # BLD AUTO: 0.56 K/UL (ref 0–0.85)
MONOCYTES NFR BLD AUTO: 9.5 % (ref 0–13.4)
NEUTROPHILS # BLD AUTO: 4.15 K/UL (ref 2–7.15)
NEUTROPHILS NFR BLD: 70.1 % (ref 44–72)
NRBC # BLD AUTO: 0 K/UL
NRBC BLD-RTO: 0 /100 WBC
PHOSPHATE SERPL-MCNC: 2.7 MG/DL (ref 2.5–4.5)
PLATELET # BLD AUTO: 87 K/UL (ref 164–446)
PMV BLD AUTO: 11.5 FL (ref 9–12.9)
POTASSIUM SERPL-SCNC: 4.2 MMOL/L (ref 3.6–5.5)
RBC # BLD AUTO: 3.16 M/UL (ref 4.2–5.4)
SODIUM SERPL-SCNC: 138 MMOL/L (ref 135–145)
T4 FREE SERPL-MCNC: 1.93 NG/DL (ref 0.53–1.43)
WBC # BLD AUTO: 5.9 K/UL (ref 4.8–10.8)

## 2018-11-06 PROCEDURE — 99232 SBSQ HOSP IP/OBS MODERATE 35: CPT | Performed by: INTERNAL MEDICINE

## 2018-11-06 PROCEDURE — G8988 SELF CARE GOAL STATUS: HCPCS | Mod: CI

## 2018-11-06 PROCEDURE — 700102 HCHG RX REV CODE 250 W/ 637 OVERRIDE(OP): Performed by: ORTHOPAEDIC SURGERY

## 2018-11-06 PROCEDURE — 700102 HCHG RX REV CODE 250 W/ 637 OVERRIDE(OP): Performed by: INTERNAL MEDICINE

## 2018-11-06 PROCEDURE — 770006 HCHG ROOM/CARE - MED/SURG/GYN SEMI*

## 2018-11-06 PROCEDURE — 700111 HCHG RX REV CODE 636 W/ 250 OVERRIDE (IP): Performed by: ORTHOPAEDIC SURGERY

## 2018-11-06 PROCEDURE — A9270 NON-COVERED ITEM OR SERVICE: HCPCS | Performed by: ORTHOPAEDIC SURGERY

## 2018-11-06 PROCEDURE — 700102 HCHG RX REV CODE 250 W/ 637 OVERRIDE(OP): Performed by: FAMILY MEDICINE

## 2018-11-06 PROCEDURE — 84439 ASSAY OF FREE THYROXINE: CPT

## 2018-11-06 PROCEDURE — 700112 HCHG RX REV CODE 229: Performed by: ORTHOPAEDIC SURGERY

## 2018-11-06 PROCEDURE — 97163 PT EVAL HIGH COMPLEX 45 MIN: CPT

## 2018-11-06 PROCEDURE — 36415 COLL VENOUS BLD VENIPUNCTURE: CPT

## 2018-11-06 PROCEDURE — 97166 OT EVAL MOD COMPLEX 45 MIN: CPT

## 2018-11-06 PROCEDURE — G8978 MOBILITY CURRENT STATUS: HCPCS | Mod: CK

## 2018-11-06 PROCEDURE — 85025 COMPLETE CBC W/AUTO DIFF WBC: CPT

## 2018-11-06 PROCEDURE — 700111 HCHG RX REV CODE 636 W/ 250 OVERRIDE (IP): Performed by: FAMILY MEDICINE

## 2018-11-06 PROCEDURE — A9270 NON-COVERED ITEM OR SERVICE: HCPCS | Performed by: INTERNAL MEDICINE

## 2018-11-06 PROCEDURE — A9270 NON-COVERED ITEM OR SERVICE: HCPCS | Performed by: FAMILY MEDICINE

## 2018-11-06 PROCEDURE — 700102 HCHG RX REV CODE 250 W/ 637 OVERRIDE(OP): Performed by: HOSPITALIST

## 2018-11-06 PROCEDURE — G8987 SELF CARE CURRENT STATUS: HCPCS | Mod: CK

## 2018-11-06 PROCEDURE — G8979 MOBILITY GOAL STATUS: HCPCS | Mod: CI

## 2018-11-06 PROCEDURE — A9270 NON-COVERED ITEM OR SERVICE: HCPCS | Performed by: HOSPITALIST

## 2018-11-06 PROCEDURE — 80069 RENAL FUNCTION PANEL: CPT

## 2018-11-06 RX ORDER — LEVOTHYROXINE SODIUM 0.03 MG/1
100 TABLET ORAL
Status: DISCONTINUED | OUTPATIENT
Start: 2018-11-07 | End: 2018-11-08 | Stop reason: HOSPADM

## 2018-11-06 RX ORDER — HYDROXYZINE HYDROCHLORIDE 10 MG/1
10 TABLET, FILM COATED ORAL 3 TIMES DAILY PRN
Status: DISCONTINUED | OUTPATIENT
Start: 2018-11-06 | End: 2018-11-08 | Stop reason: HOSPADM

## 2018-11-06 RX ADMIN — CEFAZOLIN SODIUM 2 G: 2 INJECTION, SOLUTION INTRAVENOUS at 03:00

## 2018-11-06 RX ADMIN — OXYCODONE HYDROCHLORIDE 10 MG: 10 TABLET ORAL at 05:40

## 2018-11-06 RX ADMIN — ACETAMINOPHEN 650 MG: 325 TABLET, FILM COATED ORAL at 11:59

## 2018-11-06 RX ADMIN — CYANOCOBALAMIN TAB 500 MCG 1000 MCG: 500 TAB at 05:40

## 2018-11-06 RX ADMIN — STANDARDIZED SENNA CONCENTRATE AND DOCUSATE SODIUM 1 TABLET: 8.6; 5 TABLET, FILM COATED ORAL at 21:59

## 2018-11-06 RX ADMIN — APIXABAN 2.5 MG: 5 TABLET, FILM COATED ORAL at 17:49

## 2018-11-06 RX ADMIN — HYDROCORTISONE: 10 CREAM TOPICAL at 17:56

## 2018-11-06 RX ADMIN — ACETAMINOPHEN 650 MG: 325 TABLET, FILM COATED ORAL at 05:40

## 2018-11-06 RX ADMIN — ONDANSETRON 4 MG: 4 TABLET, ORALLY DISINTEGRATING ORAL at 21:59

## 2018-11-06 RX ADMIN — DOCUSATE SODIUM 100 MG: 100 CAPSULE, LIQUID FILLED ORAL at 05:39

## 2018-11-06 RX ADMIN — DOCUSATE SODIUM 100 MG: 100 CAPSULE, LIQUID FILLED ORAL at 17:49

## 2018-11-06 RX ADMIN — LEVOTHYROXINE SODIUM 150 MCG: 75 TABLET ORAL at 05:39

## 2018-11-06 RX ADMIN — ACETAMINOPHEN 650 MG: 325 TABLET, FILM COATED ORAL at 17:49

## 2018-11-06 RX ADMIN — HYDROXYZINE HYDROCHLORIDE 10 MG: 10 TABLET ORAL at 14:50

## 2018-11-06 RX ADMIN — OXYCODONE HYDROCHLORIDE 10 MG: 10 TABLET ORAL at 21:59

## 2018-11-06 RX ADMIN — OXYCODONE HYDROCHLORIDE 5 MG: 5 TABLET ORAL at 19:11

## 2018-11-06 RX ADMIN — HYDROCORTISONE: 10 CREAM TOPICAL at 05:43

## 2018-11-06 RX ADMIN — APIXABAN 2.5 MG: 5 TABLET, FILM COATED ORAL at 05:39

## 2018-11-06 ASSESSMENT — GAIT ASSESSMENTS
ASSISTIVE DEVICE: FRONT WHEEL WALKER
DEVIATION: ANTALGIC;STEP TO;DECREASED BASE OF SUPPORT;DECREASED HEEL STRIKE;DECREASED TOE OFF
DISTANCE (FEET): 5
GAIT LEVEL OF ASSIST: MODERATE ASSIST

## 2018-11-06 ASSESSMENT — COGNITIVE AND FUNCTIONAL STATUS - GENERAL
MOVING TO AND FROM BED TO CHAIR: UNABLE
HELP NEEDED FOR BATHING: A LOT
TOILETING: A LOT
WALKING IN HOSPITAL ROOM: TOTAL
PERSONAL GROOMING: A LITTLE
CLIMB 3 TO 5 STEPS WITH RAILING: TOTAL
SUGGESTED CMS G CODE MODIFIER DAILY ACTIVITY: CK
DAILY ACTIVITIY SCORE: 17
STANDING UP FROM CHAIR USING ARMS: A LOT
MOBILITY SCORE: 10
DRESSING REGULAR LOWER BODY CLOTHING: A LOT
TURNING FROM BACK TO SIDE WHILE IN FLAT BAD: A LITTLE
SUGGESTED CMS G CODE MODIFIER MOBILITY: CL
MOVING FROM LYING ON BACK TO SITTING ON SIDE OF FLAT BED: A LOT

## 2018-11-06 ASSESSMENT — PAIN SCALES - GENERAL
PAINLEVEL_OUTOF10: 6
PAINLEVEL_OUTOF10: 10
PAINLEVEL_OUTOF10: 5
PAINLEVEL_OUTOF10: 6
PAINLEVEL_OUTOF10: 8

## 2018-11-06 ASSESSMENT — ENCOUNTER SYMPTOMS
COUGH: 0
FEVER: 0
HEADACHES: 0
ABDOMINAL PAIN: 0
NAUSEA: 0
PSYCHIATRIC NEGATIVE: 1
VOMITING: 0

## 2018-11-06 ASSESSMENT — ACTIVITIES OF DAILY LIVING (ADL): TOILETING: INDEPENDENT

## 2018-11-06 NOTE — THERAPY
"Occupational Therapy Evaluation completed.   Functional Status:  Mod A x2 supine to sit.  Max A LB dressing.  Min A sit stand.  Mod A to transfer to/from [facility].  Mod A toileting.  Pt returned to supine with mod A x2.  Pt limited by pain at this time.  Plan of Care: Will benefit from Occupational Therapy 3 times per week  Discharge Recommendations:  Equipment: Will Continue to Assess for Equipment Needs. Pt would benefit from further therapy at rehab or SNF prior to DC home.    See \"Rehab Therapy-Acute\" Patient Summary Report for complete documentation.    "

## 2018-11-06 NOTE — THERAPY
"Physical Therapy Evaluation completed.   Bed Mobility:  Supine to Sit: Maximal Assist  Transfers: Sit to Stand: Moderate Assist  Gait: Level Of Assist: Moderate Assist with Front-Wheel Walker       Plan of Care: Will benefit from Physical Therapy 3 times per week  Discharge Recommendations: Equipment: Front-Wheel Walker. Recommend inpatient transitional care services for continued physical therapy services.     See \"Rehab Therapy-Acute\" Patient Summary Report for complete documentation.     Pt was recently admitted for a GLF and presented with a R hip fracture and is now s/p closed reduction and pinning of R hip. Pt presented with impaired balance, impaired gait, pain, weakness, and dec activity tolerance. Pt was primarily limtied due to pain with functional mobility. Pt was able to demonstrate Max A for bed mobility, Mod A for sit<>stands, and tranfers. Pt was able to take a few steps forward with Mod A and FWW use, however, demonstrated poor WB tolerance on RLE. Pt was able to weight shift while in static standing positiong, however, continued to unload RLE using FWW. Pt will benefit from skilled PT while in house. With current functional mobility will recommend post acute therapy needs prior to d/c home given current objective findings, age, IPLOF, envionmental barriers, and limtied social support at home.   "

## 2018-11-06 NOTE — PROGRESS NOTES
[facility] Medicine Daily Progress Note    Date of Service  11/6/2018    Chief Complaint  89 y.o. female admitted 11/4/2018 with right femoral neck fracture    Interval Problem Update  R femoral neck fx-pain is well controlled, denies any new numbness or weakness at this time. PT/OT recommend SNF    Itchy skin-has been bothering her for several months now, denies any discrete bumps or bug bites. Has tried benadryl in the past with little effectiveness.    Disposition  SNF    Review of Systems  Review of Systems   Constitutional: Negative for fever.   HENT: Negative for hearing loss.    Respiratory: Negative for cough.    Cardiovascular: Negative for chest pain.   Gastrointestinal: Negative for abdominal pain, nausea and vomiting.   Genitourinary: Negative for dysuria.   Musculoskeletal: Positive for joint pain.        Minimal today     Skin: Positive for itching (remains unchanged today).   Neurological: Negative for headaches.   Psychiatric/Behavioral: Negative.         Physical Exam  Temp:  [36.1 °C (97 °F)-36.9 °C (98.5 °F)] 36.7 °C (98.1 °F)  Pulse:  [60-98] 98  Resp:  [14-18] 15  BP: (106-138)/(56-86) 125/69    Physical Exam   Constitutional: She appears well-developed.   Appears younger than her stated age   HENT:   Head: Normocephalic.   Eyes: Conjunctivae are normal.   Cardiovascular: Normal rate.  Exam reveals no gallop.    Pulmonary/Chest: No stridor. No respiratory distress. She has no wheezes.   Abdominal: She exhibits no distension. There is no tenderness.   Musculoskeletal: She exhibits tenderness.   Surgical incision site appears C/D/I   Neurological: She is alert. Coordination normal.   Skin: Rash (scattered papules throughout body, scabs where prior picking has occurred) noted.   Nursing note and vitals reviewed.      Fluids    Intake/Output Summary (Last 24 hours) at 11/06/18 1535  Last data filed at 11/05/18 1700   Gross per 24 hour   Intake              240 ml   Output                0 ml   Net               240 ml       Laboratory  Recent Labs      11/04/18   1641  11/05/18   0403  11/06/18   0434   WBC  7.9  5.6  5.9   RBC  3.82*  3.37*  3.16*   HEMOGLOBIN  11.4*  10.2*  9.6*   HEMATOCRIT  35.8*  31.0*  29.7*   MCV  93.7  92.0  94.0   MCH  29.8  30.3  30.4   MCHC  31.8*  32.9*  32.3*   RDW  47.5  46.5  47.8   PLATELETCT  133*  106*  87*   MPV  11.4  11.7  11.5     Recent Labs      11/04/18   1641  11/05/18   0403  11/06/18   0434   SODIUM  142  140  138   POTASSIUM  3.9  3.9  4.2   CHLORIDE  110  110  107   CO2  24  22  27   GLUCOSE  95  99  125*   BUN  16  13  14   CREATININE  0.60  0.54  0.71   CALCIUM  9.0  8.1*  8.0*     Recent Labs      11/04/18   1533  11/04/18   1641   APTT  RR  25.4   INR  RR  1.07               Imaging  DX-HIP-UNILATERAL-W/O PELVIS-2/3 VIEWS RIGHT   Final Result      Intraoperative evaluation of ORIF of right femoral neck fracture.      DX-PORTABLE FLUORO > 1 HOUR   Final Result      Intraoperative evaluation of ORIF of right femoral neck fracture.      DX-KNEE 2- RIGHT   Final Result      No evidence of fracture or dislocation.   Mild osteoarthritis.      DX-CHEST-PORTABLE (1 VIEW)   Final Result         No acute cardiac or pulmonary abnormality is identified.      CT-PELVIS W/O   Final Result      1.  Impacted acute versus subacute fracture of the subcapital right femoral neck does appear to be present.      2.  Left hip arthroplasty noted in place.      DX-PELVIS-1 OR 2 VIEWS   Final Result      1.  No acute fracture or dislocation is identified.   2.  Osteopenia and mild osteoarthritis.      DX-FEMUR-2+ RIGHT   Final Result      No radiographic evidence of acute traumatic injury.           Assessment/Plan  * Closed fracture of neck of right femur (HCC)- (present on admission)   Assessment & Plan    Orthopedic surgery consulted, s/p  Closed reduction percutaneous pinning, right femoral neck POD#2, doing well, no adjustments needed, DVT px is eliquis  Pain control  Vitamin D 49      Frequent falls- (present on admission)   Assessment & Plan    PT and OT evaluation  Vitamin D 49     PAF (paroxysmal atrial fibrillation) (HCC)- (present on admission)   Assessment & Plan    Resumed Eliquis after surgery, tolerating well, no e/o overt bleeding  -HR is well controlled at this time     History of CVA (cerebrovascular accident)- (present on admission)   Assessment & Plan    Resume Eliquis     Hypertension- (present on admission)   Assessment & Plan    Monitor for now     Itchy skin- (present on admission)   Assessment & Plan    -apparently been there for 2 months  -no discrete animal marks at this time, some element of excoriations in various parts of the body  -does not appear to be narcotic related  -trial of low dose atarax PRN   -eos's are normal     Thrombocytopenia (HCC)- (present on admission)   Assessment & Plan    -dropping slowly, no e/o overt bleeding, daily labs, continue eliquis for now     Vitamin B12 deficiency- (present on admission)   Assessment & Plan    -B12 level was low normal at 365  -Likely has underlying deficiency  -Repleting with IM followed by oral cyanocobalamin     Hypothyroidism- (present on admission)   Assessment & Plan    Continue Synthroid but reduce the dose to 100 mcg today  TSH low at 0.010  Free T4 is high          VTE prophylaxis: Apixaban

## 2018-11-06 NOTE — ASSESSMENT & PLAN NOTE
-apparently been there for several years  -no discrete animal marks at this time, some element of excoriations in various parts of the body  -does not appear to be narcotic related  -trial of low dose atarax PRN and 1% hydrocortisone cream   -eos's are normal

## 2018-11-06 NOTE — CARE PLAN
Problem: Safety  Goal: Will remain free from falls    Intervention: Assess risk factors for falls  Room free from clutter, bed rails up x2, in lowest position, pt instructed to use call light to get up       Problem: Skin Integrity  Goal: Risk for impaired skin integrity will decrease    Intervention: Assess and monitor skin integrity, appearance and/or temperature  Lotion applied to pt's skin due to pt itching. Skin is warm and intact .

## 2018-11-06 NOTE — CARE PLAN
Problem: Pain Management  Goal: Pain level will decrease to patient's comfort goal    Intervention: Educate and implement non-pharmacologic comfort measures. Examples: relaxation, distration, play therapy, activity therapy, massage, etc.  Pt states pain while moving. Pt refused pain meds. Pt states comfort in warm blankets and relaxation

## 2018-11-06 NOTE — PROGRESS NOTES
"   Orthopaedic Progress Note    Interval changes:  Patient doing well post op  Dressing CDI   Cleared for DC to SNF pending medicine clearance    ROS - Patient denies any new issues.  Pain well controlled.    Blood pressure 125/69, pulse 98, temperature 36.7 °C (98.1 °F), resp. rate 15, height 1.626 m (5' 4\"), weight 55 kg (121 lb 4.1 oz), SpO2 96 %, not currently breastfeeding.      Patient seen and examined  No acute distress  Breathing non labored  RRR  Right hip dressing is clean, dry, and intact. Patient clearly fires tibialis anterior, EHL, and gastrocnemius/soleus. Sensation is intact to light touch throughout superficial peroneal, deep peroneal, tibial, saphenous, and sural nerve distributions. Strong and palpable 2+ dorsalis pedis and posterior tibial pulses with capillary refill less than 2 seconds. No lower leg tenderness or discomfort.        Recent Labs      11/04/18   1641  11/05/18   0403  11/06/18   0434   WBC  7.9  5.6  5.9   RBC  3.82*  3.37*  3.16*   HEMOGLOBIN  11.4*  10.2*  9.6*   HEMATOCRIT  35.8*  31.0*  29.7*   MCV  93.7  92.0  94.0   MCH  29.8  30.3  30.4   MCHC  31.8*  32.9*  32.3*   RDW  47.5  46.5  47.8   PLATELETCT  133*  106*  87*   MPV  11.4  11.7  11.5       Active Hospital Problems    Diagnosis   • Frequent falls [R29.6]     Priority: High   • Closed fracture of neck of right femur ([facility]) [S72.001A]     Priority: High   • PAF (paroxysmal atrial fibrillation) ([facility]) [I48.0]     Priority: Medium   • History of CVA (cerebrovascular accident) [Z86.73]     Priority: Medium   • Hypertension [I10]     Priority: Medium   • Hypothyroidism [E03.9]     Priority: Low   • Thrombocytopenia ([facility]) [D69.6]   • Itchy skin [L29.9]   • Vitamin B12 deficiency [E53.8]       Assessment/Plan:  Doing well post op  Cleared for DC to SNF by ortho pending medicine clearance  POD#1 S/P Closed reduction percutaneous pinning, right femoral neck.  Wt bearing status - WBAT  Wound care/Drains - dressing change tomorrow " then every other day by nursing  Future Procedures - none planned   Sutures/Staples out- 10-14 days post operatively  PT/OT-initiated  Antibiotics: completed  DVT Prophylaxis- TEDS/SCDs/Foot pumps  Tristan-none  Case Coordination for Discharge Planning - Disposition SNF

## 2018-11-07 LAB
BASOPHILS # BLD AUTO: 0.2 % (ref 0–1.8)
BASOPHILS # BLD: 0.01 K/UL (ref 0–0.12)
EOSINOPHIL # BLD AUTO: 0.21 K/UL (ref 0–0.51)
EOSINOPHIL NFR BLD: 4 % (ref 0–6.9)
ERYTHROCYTE [DISTWIDTH] IN BLOOD BY AUTOMATED COUNT: 47 FL (ref 35.9–50)
HCT VFR BLD AUTO: 29.9 % (ref 37–47)
HGB BLD-MCNC: 9.8 G/DL (ref 12–16)
IMM GRANULOCYTES # BLD AUTO: 0.02 K/UL (ref 0–0.11)
IMM GRANULOCYTES NFR BLD AUTO: 0.4 % (ref 0–0.9)
LYMPHOCYTES # BLD AUTO: 1.6 K/UL (ref 1–4.8)
LYMPHOCYTES NFR BLD: 30.2 % (ref 22–41)
MCH RBC QN AUTO: 30.9 PG (ref 27–33)
MCHC RBC AUTO-ENTMCNC: 32.8 G/DL (ref 33.6–35)
MCV RBC AUTO: 94.3 FL (ref 81.4–97.8)
MONOCYTES # BLD AUTO: 0.61 K/UL (ref 0–0.85)
MONOCYTES NFR BLD AUTO: 11.5 % (ref 0–13.4)
NEUTROPHILS # BLD AUTO: 2.84 K/UL (ref 2–7.15)
NEUTROPHILS NFR BLD: 53.7 % (ref 44–72)
NRBC # BLD AUTO: 0 K/UL
NRBC BLD-RTO: 0 /100 WBC
PLATELET # BLD AUTO: 77 K/UL (ref 164–446)
PMV BLD AUTO: 12.1 FL (ref 9–12.9)
RBC # BLD AUTO: 3.17 M/UL (ref 4.2–5.4)
WBC # BLD AUTO: 5.3 K/UL (ref 4.8–10.8)

## 2018-11-07 PROCEDURE — A9270 NON-COVERED ITEM OR SERVICE: HCPCS | Performed by: INTERNAL MEDICINE

## 2018-11-07 PROCEDURE — 700102 HCHG RX REV CODE 250 W/ 637 OVERRIDE(OP): Performed by: ORTHOPAEDIC SURGERY

## 2018-11-07 PROCEDURE — 700111 HCHG RX REV CODE 636 W/ 250 OVERRIDE (IP): Performed by: FAMILY MEDICINE

## 2018-11-07 PROCEDURE — A9270 NON-COVERED ITEM OR SERVICE: HCPCS | Performed by: ORTHOPAEDIC SURGERY

## 2018-11-07 PROCEDURE — 97110 THERAPEUTIC EXERCISES: CPT

## 2018-11-07 PROCEDURE — A9270 NON-COVERED ITEM OR SERVICE: HCPCS | Performed by: HOSPITALIST

## 2018-11-07 PROCEDURE — 770006 HCHG ROOM/CARE - MED/SURG/GYN SEMI*

## 2018-11-07 PROCEDURE — 700102 HCHG RX REV CODE 250 W/ 637 OVERRIDE(OP): Performed by: INTERNAL MEDICINE

## 2018-11-07 PROCEDURE — 85025 COMPLETE CBC W/AUTO DIFF WBC: CPT

## 2018-11-07 PROCEDURE — 700112 HCHG RX REV CODE 229: Performed by: ORTHOPAEDIC SURGERY

## 2018-11-07 PROCEDURE — 99232 SBSQ HOSP IP/OBS MODERATE 35: CPT | Performed by: INTERNAL MEDICINE

## 2018-11-07 PROCEDURE — 700102 HCHG RX REV CODE 250 W/ 637 OVERRIDE(OP): Performed by: HOSPITALIST

## 2018-11-07 PROCEDURE — 97535 SELF CARE MNGMENT TRAINING: CPT

## 2018-11-07 PROCEDURE — 36415 COLL VENOUS BLD VENIPUNCTURE: CPT

## 2018-11-07 RX ADMIN — HYDROXYZINE HYDROCHLORIDE 10 MG: 10 TABLET ORAL at 22:59

## 2018-11-07 RX ADMIN — ACETAMINOPHEN 650 MG: 325 TABLET, FILM COATED ORAL at 18:09

## 2018-11-07 RX ADMIN — APIXABAN 2.5 MG: 5 TABLET, FILM COATED ORAL at 18:08

## 2018-11-07 RX ADMIN — HYDROCORTISONE: 10 CREAM TOPICAL at 18:11

## 2018-11-07 RX ADMIN — HYDROXYZINE HYDROCHLORIDE 10 MG: 10 TABLET ORAL at 01:41

## 2018-11-07 RX ADMIN — ACETAMINOPHEN 650 MG: 325 TABLET, FILM COATED ORAL at 01:41

## 2018-11-07 RX ADMIN — OXYCODONE HYDROCHLORIDE 5 MG: 5 TABLET ORAL at 11:41

## 2018-11-07 RX ADMIN — ACETAMINOPHEN 650 MG: 325 TABLET, FILM COATED ORAL at 22:59

## 2018-11-07 RX ADMIN — APIXABAN 2.5 MG: 5 TABLET, FILM COATED ORAL at 04:49

## 2018-11-07 RX ADMIN — ACETAMINOPHEN 650 MG: 325 TABLET, FILM COATED ORAL at 11:41

## 2018-11-07 RX ADMIN — CYANOCOBALAMIN TAB 500 MCG 1000 MCG: 500 TAB at 04:48

## 2018-11-07 RX ADMIN — OXYCODONE HYDROCHLORIDE 10 MG: 10 TABLET ORAL at 04:49

## 2018-11-07 RX ADMIN — ACETAMINOPHEN 650 MG: 325 TABLET, FILM COATED ORAL at 04:48

## 2018-11-07 RX ADMIN — HYDROCORTISONE: 10 CREAM TOPICAL at 09:23

## 2018-11-07 RX ADMIN — OXYCODONE HYDROCHLORIDE 10 MG: 10 TABLET ORAL at 01:41

## 2018-11-07 RX ADMIN — ONDANSETRON 4 MG: 4 TABLET, ORALLY DISINTEGRATING ORAL at 04:49

## 2018-11-07 RX ADMIN — DOCUSATE SODIUM 100 MG: 100 CAPSULE, LIQUID FILLED ORAL at 18:08

## 2018-11-07 RX ADMIN — LEVOTHYROXINE SODIUM 100 MCG: 25 TABLET ORAL at 04:48

## 2018-11-07 RX ADMIN — OXYCODONE HYDROCHLORIDE 10 MG: 10 TABLET ORAL at 18:08

## 2018-11-07 RX ADMIN — OXYCODONE HYDROCHLORIDE 5 MG: 5 TABLET ORAL at 22:59

## 2018-11-07 RX ADMIN — HYDROXYZINE HYDROCHLORIDE 10 MG: 10 TABLET ORAL at 09:22

## 2018-11-07 RX ADMIN — DOCUSATE SODIUM 100 MG: 100 CAPSULE, LIQUID FILLED ORAL at 04:49

## 2018-11-07 ASSESSMENT — ENCOUNTER SYMPTOMS
ROS SKIN COMMENTS: UNCHANGED
MYALGIAS: 0
DIZZINESS: 0
SHORTNESS OF BREATH: 0
CHILLS: 0
BLURRED VISION: 0
PSYCHIATRIC NEGATIVE: 1
DIARRHEA: 0
PALPITATIONS: 0

## 2018-11-07 ASSESSMENT — COGNITIVE AND FUNCTIONAL STATUS - GENERAL
DRESSING REGULAR UPPER BODY CLOTHING: A LITTLE
TOILETING: A LOT
SUGGESTED CMS G CODE MODIFIER DAILY ACTIVITY: CK
EATING MEALS: A LITTLE
HELP NEEDED FOR BATHING: A LOT
PERSONAL GROOMING: A LOT
DRESSING REGULAR LOWER BODY CLOTHING: A LOT
DAILY ACTIVITIY SCORE: 14

## 2018-11-07 ASSESSMENT — PAIN SCALES - GENERAL
PAINLEVEL_OUTOF10: 10
PAINLEVEL_OUTOF10: 6
PAINLEVEL_OUTOF10: 7
PAINLEVEL_OUTOF10: 5
PAINLEVEL_OUTOF10: 8
PAINLEVEL_OUTOF10: 10
PAINLEVEL_OUTOF10: 10
PAINLEVEL_OUTOF10: 8

## 2018-11-07 NOTE — DISCHARGE PLANNING
Anticipated Discharge Disposition: SNF    Action: LSW met w/ pt at bedside to explain and receive choice for SNF. Pt chose 1) [facility], 2) [facility]. [facility] notified. MD states that pt is m/c and can be transferred once accepted by SNF.    Barriers to Discharge: None    Plan: Pending acceptance to SNF.

## 2018-11-07 NOTE — DISCHARGE PLANNING
Received Choice form at 3169  Agency/Facility Name: [facility]  Referral sent per Choice form @ 4116

## 2018-11-07 NOTE — THERAPY
"Occupational Therapy Treatment completed with focus on ADLs, ADL transfers, patient education and upper extremity function.  Plan of Care: Will benefit from Occupational Therapy 3 times per week  Discharge Recommendations:  Equipment Will Continue to Assess for Equipment Needs. Post-acute therapy Discharge to a transitional care facility for continued skilled therapy services.    Patient seen for OT Treat focused on EOB and seated ADLs necessitating Max A ADLs and Mod/Min A mobility with FWW / HHA. Patient limited by pain and fatigue. Patient agreeable with encouragement and performed therex seated between ADL rest breaks. Patient would benefit from continued skilled OT in this setting followed by post acute placement.     See \"Rehab Therapy-Acute\" Patient Summary Report for complete documentation.   "

## 2018-11-07 NOTE — PROGRESS NOTES
2 RN skin check:    Skin pale, flaky, dry bodywide. Frequently observed scratching self in multiple areas. Reports generalized, body wide pruritus. Scattered scratch marks and small scabs noted.    Both pedal pulses weak but right pedal pulse slightly weaker. Right foot is slightly cooler than rest of body. +1 edema to RLE; +2 to right ankle. Right heel red but intact and blanchable with 3 second cap refill; RLE elevated and right heel floating. Duoderm dressing applied to right heel to protect skin. Coccyx/sacrum red but intact and blanchable; cap refill 3 seconds. Patient needed a lot of education and encouragement to allow RN to tilt her onto her left side and place a pillow under her right hip. Patient states laying flat on her back is the most comfortable position for her right hip. RN told patient that staff will be asking to shift her position slightly throughout the night vs. patient lying supine all night.

## 2018-11-07 NOTE — PROGRESS NOTES
[facility] Medicine Daily Progress Note    Date of Service  11/7/2018    Chief Complaint  89 y.o. female admitted 11/4/2018 with right femoral neck fracture    Interval Problem Update  R femoral neck fx-pain remains well controlled, denies any new numbness or weakness. Pain medications are helping quite well right now.     Itchy skin-apparently has been there for several years, current therapies are not providing much improvement for her.     Disposition  SNF    Review of Systems  Review of Systems   Constitutional: Negative for chills.   HENT: Negative for tinnitus.    Eyes: Negative for blurred vision.   Respiratory: Negative for shortness of breath.    Cardiovascular: Negative for palpitations.   Gastrointestinal: Negative for diarrhea.   Genitourinary: Negative for urgency.   Musculoskeletal: Positive for joint pain. Negative for myalgias.        Well controlled     Skin: Positive for itching (remains unchanged today).        Unchanged     Neurological: Negative for dizziness.   Psychiatric/Behavioral: Negative.         Physical Exam  Temp:  [36.1 °C (96.9 °F)-37.1 °C (98.8 °F)] 36.9 °C (98.5 °F)  Pulse:  [65-90] 90  Resp:  [16-18] 17  BP: (106-142)/(56-72) 120/64    Physical Exam   Constitutional: She appears well-developed.   Appears younger than her stated age   HENT:   Head: Normocephalic.   Eyes: Conjunctivae are normal. Right eye exhibits no discharge. Left eye exhibits no discharge.   Cardiovascular: Normal rate.  Exam reveals no gallop.    Pulmonary/Chest: No stridor. She has no wheezes. She has no rales.   Abdominal: There is no tenderness.   Musculoskeletal: She exhibits tenderness.   Surgical incision site appears C/D/I and unchanged   Neurological: She is alert. No cranial nerve deficit.   Skin: Rash (scattered papules throughout body, scabs where prior picking has occurred) noted.   Unchanged   Nursing note and vitals reviewed.      Fluids    Intake/Output Summary (Last 24 hours) at 11/07/18  1354  Last data filed at 11/06/18 2200   Gross per 24 hour   Intake              300 ml   Output                0 ml   Net              300 ml       Laboratory  Recent Labs      11/05/18   0403  11/06/18   0434  11/07/18   0458   WBC  5.6  5.9  5.3   RBC  3.37*  3.16*  3.17*   HEMOGLOBIN  10.2*  9.6*  9.8*   HEMATOCRIT  31.0*  29.7*  29.9*   MCV  92.0  94.0  94.3   MCH  30.3  30.4  30.9   MCHC  32.9*  32.3*  32.8*   RDW  46.5  47.8  47.0   PLATELETCT  106*  87*  77*   MPV  11.7  11.5  12.1     Recent Labs      11/04/18   1641  11/05/18   0403  11/06/18   0434   SODIUM  142  140  138   POTASSIUM  3.9  3.9  4.2   CHLORIDE  110  110  107   CO2  24  22  27   GLUCOSE  95  99  125*   BUN  16  13  14   CREATININE  0.60  0.54  0.71   CALCIUM  9.0  8.1*  8.0*     Recent Labs      11/04/18   1533  11/04/18   1641   APTT  RR  25.4   INR  RR  1.07               Imaging  DX-HIP-UNILATERAL-W/O PELVIS-2/3 VIEWS RIGHT   Final Result      Intraoperative evaluation of ORIF of right femoral neck fracture.      DX-PORTABLE FLUORO > 1 HOUR   Final Result      Intraoperative evaluation of ORIF of right femoral neck fracture.      DX-KNEE 2- RIGHT   Final Result      No evidence of fracture or dislocation.   Mild osteoarthritis.      DX-CHEST-PORTABLE (1 VIEW)   Final Result         No acute cardiac or pulmonary abnormality is identified.      CT-PELVIS W/O   Final Result      1.  Impacted acute versus subacute fracture of the subcapital right femoral neck does appear to be present.      2.  Left hip arthroplasty noted in place.      DX-PELVIS-1 OR 2 VIEWS   Final Result      1.  No acute fracture or dislocation is identified.   2.  Osteopenia and mild osteoarthritis.      DX-FEMUR-2+ RIGHT   Final Result      No radiographic evidence of acute traumatic injury.           Assessment/Plan  * Closed fracture of neck of right femur (HCC)- (present on admission)   Assessment & Plan    Orthopedic surgery consulted, s/p  Closed reduction  percutaneous pinning, right femoral neck POD#2, doing well, no adjustments needed, DVT px is eliquis  Pain control  Vitamin D 49     Frequent falls- (present on admission)   Assessment & Plan    PT and OT evaluation  Vitamin D 49     PAF (paroxysmal atrial fibrillation) (HCC)- (present on admission)   Assessment & Plan    Resumed Eliquis after surgery, tolerating well, no e/o overt bleeding noted again today  -HR is well controlled at this time     History of CVA (cerebrovascular accident)- (present on admission)   Assessment & Plan    Resume Eliquis     Hypertension- (present on admission)   Assessment & Plan    Monitor for now     Itchy skin- (present on admission)   Assessment & Plan    -apparently been there for several years  -no discrete animal marks at this time, some element of excoriations in various parts of the body  -does not appear to be narcotic related  -trial of low dose atarax PRN and 1% hydrocortisone cream   -eos's are normal     Thrombocytopenia (HCC)- (present on admission)   Assessment & Plan    -dropping slowly again today, no e/o overt bleeding, daily labs, continue eliquis for now     Vitamin B12 deficiency- (present on admission)   Assessment & Plan    -B12 level was low normal at 365  -Likely has underlying deficiency  -Repleting with IM followed by oral cyanocobalamin     Hypothyroidism- (present on admission)   Assessment & Plan    Continue Synthroid but reduce the dose to 100 mcg today  TSH low at 0.010  Free T4 is high          VTE prophylaxis: Apixaban

## 2018-11-07 NOTE — PROGRESS NOTES
"Patient seen and examined  Complains of pain    Blood pressure 106/56, pulse 65, temperature 36.1 °C (96.9 °F), resp. rate 16, height 1.626 m (5' 4\"), weight 55 kg (121 lb 4.1 oz), SpO2 98 %, not currently breastfeeding.    Recent Labs      11/05/18   0403  11/06/18   0434  11/07/18   0458   WBC  5.6  5.9  5.3   RBC  3.37*  3.16*  3.17*   HEMOGLOBIN  10.2*  9.6*  9.8*   HEMATOCRIT  31.0*  29.7*  29.9*   MCV  92.0  94.0  94.3   MCH  30.3  30.4  30.9   MCHC  32.9*  32.3*  32.8*   RDW  46.5  47.8  47.0   PLATELETCT  106*  87*  77*   MPV  11.7  11.5  12.1       Resting comfortably  No acute distress  Dressing clean dry and intact      POD# 2 CRPP R hip    Plan:  DVT Prophylaxis- TEDS/SCDs, eliquis  Weight Bearing Status- wbat  PT/OT  Antibiotics: perioperative complete  Case Coordination          "

## 2018-11-07 NOTE — CARE PLAN
Problem: Safety  Goal: Will remain free from falls    Intervention: Assess risk factors for falls  Call light in reach, bed in lowest position. Pt calls to reposition and get up for bathroom       Problem: Pain Management  Goal: Pain level will decrease to patient's comfort goal    Intervention: Educate and implement non-pharmacologic comfort measures. Examples: relaxation, distration, play therapy, activity therapy, massage, etc.  Pt taught and seen to use deep breathing when in pain

## 2018-11-08 ENCOUNTER — HOSPITAL ENCOUNTER (INPATIENT)
Facility: REHABILITATION | Age: 83
End: 2018-11-08
Attending: PHYSICAL MEDICINE & REHABILITATION | Admitting: PHYSICAL MEDICINE & REHABILITATION
Payer: MEDICARE

## 2018-11-08 VITALS
OXYGEN SATURATION: 98 % | HEIGHT: 64 IN | SYSTOLIC BLOOD PRESSURE: 141 MMHG | WEIGHT: 121.25 LBS | DIASTOLIC BLOOD PRESSURE: 69 MMHG | BODY MASS INDEX: 20.7 KG/M2 | TEMPERATURE: 98.6 F | HEART RATE: 77 BPM | RESPIRATION RATE: 17 BRPM

## 2018-11-08 PROBLEM — S72.001A CLOSED FRACTURE OF NECK OF RIGHT FEMUR (HCC): Status: RESOLVED | Noted: 2018-11-04 | Resolved: 2018-11-08

## 2018-11-08 PROCEDURE — 700111 HCHG RX REV CODE 636 W/ 250 OVERRIDE (IP): Performed by: HOSPITALIST

## 2018-11-08 PROCEDURE — 99239 HOSP IP/OBS DSCHRG MGMT >30: CPT | Performed by: INTERNAL MEDICINE

## 2018-11-08 PROCEDURE — 700102 HCHG RX REV CODE 250 W/ 637 OVERRIDE(OP): Performed by: HOSPITALIST

## 2018-11-08 PROCEDURE — 90471 IMMUNIZATION ADMIN: CPT

## 2018-11-08 PROCEDURE — 700102 HCHG RX REV CODE 250 W/ 637 OVERRIDE(OP): Performed by: ORTHOPAEDIC SURGERY

## 2018-11-08 PROCEDURE — A9270 NON-COVERED ITEM OR SERVICE: HCPCS | Performed by: HOSPITALIST

## 2018-11-08 PROCEDURE — 90662 IIV NO PRSV INCREASED AG IM: CPT | Performed by: HOSPITALIST

## 2018-11-08 PROCEDURE — 97530 THERAPEUTIC ACTIVITIES: CPT

## 2018-11-08 PROCEDURE — 3E02340 INTRODUCTION OF INFLUENZA VACCINE INTO MUSCLE, PERCUTANEOUS APPROACH: ICD-10-PCS | Performed by: INTERNAL MEDICINE

## 2018-11-08 PROCEDURE — A9270 NON-COVERED ITEM OR SERVICE: HCPCS | Performed by: ORTHOPAEDIC SURGERY

## 2018-11-08 PROCEDURE — 700102 HCHG RX REV CODE 250 W/ 637 OVERRIDE(OP): Performed by: INTERNAL MEDICINE

## 2018-11-08 PROCEDURE — 700112 HCHG RX REV CODE 229: Performed by: ORTHOPAEDIC SURGERY

## 2018-11-08 PROCEDURE — A9270 NON-COVERED ITEM OR SERVICE: HCPCS | Performed by: INTERNAL MEDICINE

## 2018-11-08 PROCEDURE — 97110 THERAPEUTIC EXERCISES: CPT

## 2018-11-08 PROCEDURE — 99221 1ST HOSP IP/OBS SF/LOW 40: CPT | Performed by: PHYSICAL MEDICINE & REHABILITATION

## 2018-11-08 RX ORDER — AMOXICILLIN 250 MG
1 CAPSULE ORAL DAILY
Qty: 30 TAB | Refills: 0 | Status: SHIPPED | OUTPATIENT
Start: 2018-11-08

## 2018-11-08 RX ORDER — ONDANSETRON 4 MG/1
4 TABLET, ORALLY DISINTEGRATING ORAL EVERY 4 HOURS PRN
Qty: 10 TAB | Refills: 0 | Status: SHIPPED | OUTPATIENT
Start: 2018-11-08

## 2018-11-08 RX ORDER — OXYCODONE HYDROCHLORIDE 5 MG/1
5 TABLET ORAL
Qty: 5 TAB | Refills: 0 | Status: SHIPPED | OUTPATIENT
Start: 2018-11-08 | End: 2018-11-11

## 2018-11-08 RX ADMIN — HYDROCORTISONE: 10 CREAM TOPICAL at 04:45

## 2018-11-08 RX ADMIN — CYANOCOBALAMIN TAB 500 MCG 1000 MCG: 500 TAB at 05:01

## 2018-11-08 RX ADMIN — ACETAMINOPHEN 650 MG: 325 TABLET, FILM COATED ORAL at 11:58

## 2018-11-08 RX ADMIN — DOCUSATE SODIUM 100 MG: 100 CAPSULE, LIQUID FILLED ORAL at 05:01

## 2018-11-08 RX ADMIN — INFLUENZA A VIRUS A/MICHIGAN/45/2015 X-275 (H1N1) ANTIGEN (FORMALDEHYDE INACTIVATED), INFLUENZA A VIRUS A/SINGAPORE/INFIMH-16-0019/2016 IVR-186 (H3N2) ANTIGEN (FORMALDEHYDE INACTIVATED), AND INFLUENZA B VIRUS B/MARYLAND/15/2016 BX-69A (A B/COLORADO/6/2017-LIKE VIRUS) ANTIGEN (FORMALDEHYDE INACTIVATED) 0.5 ML: 60; 60; 60 INJECTION, SUSPENSION INTRAMUSCULAR at 15:45

## 2018-11-08 RX ADMIN — ACETAMINOPHEN 650 MG: 325 TABLET, FILM COATED ORAL at 05:01

## 2018-11-08 RX ADMIN — LEVOTHYROXINE SODIUM 100 MCG: 25 TABLET ORAL at 05:01

## 2018-11-08 RX ADMIN — OXYCODONE HYDROCHLORIDE 10 MG: 10 TABLET ORAL at 05:01

## 2018-11-08 RX ADMIN — APIXABAN 2.5 MG: 5 TABLET, FILM COATED ORAL at 05:01

## 2018-11-08 ASSESSMENT — COGNITIVE AND FUNCTIONAL STATUS - GENERAL
MOBILITY SCORE: 17
WALKING IN HOSPITAL ROOM: A LITTLE
MOVING TO AND FROM BED TO CHAIR: A LITTLE
CLIMB 3 TO 5 STEPS WITH RAILING: A LOT
MOVING FROM LYING ON BACK TO SITTING ON SIDE OF FLAT BED: A LITTLE
SUGGESTED CMS G CODE MODIFIER MOBILITY: CK
STANDING UP FROM CHAIR USING ARMS: A LITTLE
TURNING FROM BACK TO SIDE WHILE IN FLAT BAD: A LITTLE

## 2018-11-08 ASSESSMENT — PAIN SCALES - GENERAL
PAINLEVEL_OUTOF10: 2
PAINLEVEL_OUTOF10: 3
PAINLEVEL_OUTOF10: 2
PAINLEVEL_OUTOF10: 5

## 2018-11-08 ASSESSMENT — GAIT ASSESSMENTS: GAIT LEVEL OF ASSIST: UNABLE TO PARTICIPATE

## 2018-11-08 NOTE — PROGRESS NOTES
"   Orthopaedic PA Progress Note    Interval changes:did well overnight, slow to mobilize. PM&R note read and appreciated    ROS - Patient denies any new issues. No chest pain, dyspnea, or fever.  Pain well controlled.    Blood pressure 131/61, pulse 78, temperature 37.6 °C (99.7 °F), resp. rate 16, height 1.626 m (5' 4\"), weight 55 kg (121 lb 4.1 oz), SpO2 99 %, not currently breastfeeding.    Patient seen and examined  No acute distress  Breathing non labored  RRR  Surgical dressing is clean, dry, and intact. Patient clearly fires tibialis anterior, EHL, and gastrocnemius/soleus. Sensation is intact to light touch throughout superficial peroneal, deep peroneal, tibial, saphenous, and sural nerve distributions. Strong and palpable 2+ dorsalis pedis and posterior tibial pulses with capillary refill less than 2 seconds. No lower leg tenderness or discomfort.    Recent Labs      11/06/18   0434  11/07/18   0458   WBC  5.9  5.3   RBC  3.16*  3.17*   HEMOGLOBIN  9.6*  9.8*   HEMATOCRIT  29.7*  29.9*   MCV  94.0  94.3   MCH  30.4  30.9   MCHC  32.3*  32.8*   RDW  47.8  47.0   PLATELETCT  87*  77*   MPV  11.5  12.1       Active Hospital Problems    Diagnosis   • Frequent falls [R29.6]     Priority: High   • PAF (paroxysmal atrial fibrillation) ([facility]) [I48.0]     Priority: Medium   • History of CVA (cerebrovascular accident) [Z86.73]     Priority: Medium   • Hypertension [I10]     Priority: Medium   • Hypothyroidism [E03.9]     Priority: Low   • Thrombocytopenia ([facility]) [D69.6]   • Itchy skin [L29.9]   • Vitamin B12 deficiency [E53.8]     Assessment/Plan:  Doing well post op  Cleared for DC to SNF by ortho pending medicine clearance  POD#3 S/P Closed reduction percutaneous pinning, right femoral neck.  Wt bearing status - WBAT  Wound care/Drains - dressing left in place, plan t ochange tomorrow  Future Procedures - none planned   Sutures/Staples out- 10-14 days post operatively  PT/OT-initiated  Antibiotics: completed  DVT " Prophylaxis- TEDS/SCDs/Foot pumps  Tristan-none  Case Coordination for Discharge Planning - Disposition SNF

## 2018-11-08 NOTE — PROGRESS NOTES
"   Orthopaedic PA Progress Note    Interval changes:Doing well, pain free at rest yet slopw to mobilize    ROS - Patient denies any new issues. No chest pain, dyspnea, or fever.  Pain well controlled.    Blood pressure 131/60, pulse 84, temperature 36.8 °C (98.2 °F), resp. rate 17, height 1.626 m (5' 4\"), weight 55 kg (121 lb 4.1 oz), SpO2 93 %, not currently breastfeeding.    Patient seen and examined  No acute distress  Breathing non labored  RRR  Surgical dressing is clean, dry, and intact. Patient clearly fires tibialis anterior, EHL, and gastrocnemius/soleus. Sensation is intact to light touch throughout superficial peroneal, deep peroneal, tibial, saphenous, and sural nerve distributions. Strong and palpable 2+ dorsalis pedis and posterior tibial pulses with capillary refill less than 2 seconds. No lower leg tenderness or discomfort.    Recent Labs      11/05/18   0403  11/06/18   0434  11/07/18   0458   WBC  5.6  5.9  5.3   RBC  3.37*  3.16*  3.17*   HEMOGLOBIN  10.2*  9.6*  9.8*   HEMATOCRIT  31.0*  29.7*  29.9*   MCV  92.0  94.0  94.3   MCH  30.3  30.4  30.9   MCHC  32.9*  32.3*  32.8*   RDW  46.5  47.8  47.0   PLATELETCT  106*  87*  77*   MPV  11.7  11.5  12.1       Active Hospital Problems    Diagnosis   • Frequent falls [R29.6]     Priority: High   • Closed fracture of neck of right femur ([facility]) [S72.001A]     Priority: High   • PAF (paroxysmal atrial fibrillation) ([facility]) [I48.0]     Priority: Medium   • History of CVA (cerebrovascular accident) [Z86.73]     Priority: Medium   • Hypertension [I10]     Priority: Medium   • Hypothyroidism [E03.9]     Priority: Low   • Thrombocytopenia ([facility]) [D69.6]   • Itchy skin [L29.9]   • Vitamin B12 deficiency [E53.8]     Assessment/Plan:  Doing well post op  Cleared for DC to SNF by ortho pending medicine clearance  POD#2 S/P Closed reduction percutaneous pinning, right femoral neck.  Wt bearing status - WBAT  Wound care/Drains - dressing left in place  Future " Procedures - none planned   Sutures/Staples out- 10-14 days post operatively  PT/OT-initiated  Antibiotics: completed  DVT Prophylaxis- TEDS/SCDs/Foot pumps  Tristan-none  Case Coordination for Discharge Planning - Disposition SNF

## 2018-11-08 NOTE — THERAPY
"Pt w/impaired functional mobility. Pt exhibited improving strength and activiyt tolerance. Pt appeared somehwat confused as to where she was, though reorientable. Pt demonstrated improving bed mobility and STS, deferred amb 2' fatigue and being overwhelmed by visitors this AM. Pt willing to attempt amb on next session. Pt has potential for progressing in acute care sestting. Pt would benefit from further acute PT txs to progress towards goals and independence. Recommend post acute placement at an inpatient transitional care facilityt o address deficits.    Physical Therapy Treatment completed.   Bed Mobility:  Supine to Sit: Stand by Assist  Transfers: Sit to Stand: Contact Guard Assist  Gait: Level Of Assist: Unable to Participate        Plan of Care: Will benefit from Physical Therapy 3 times per week    See \"Rehab Therapy-Acute\" Patient Summary Report for complete documentation.       "

## 2018-11-08 NOTE — CONSULTS
Physical Medicine and Rehabilitation Consultation         Initial Consult      Date of Consultation: 11/8/2018  Consulting provider: Sergo Chappell D.O.  Reason for consultation: assess for acute inpatient rehab appropriateness  Consulting physician Fredo Bender    Chief complaint: right hip pain    HPI: The patient is a 89 y.o. female with a past medical history of paroxysmal A.fib, prior CVA, hypothyroidism, presented on 11/4/2018 after GLF resulting in right hip pain found to have right hip fx    She was walking from the kitchen to the dining room using her cane when she stumbled and fell on her right side. She denies dizziness or lightheadedness prior to the fall.   She is unaware of when she had her previous stroke, unclear if she had any weakness  She was noted to have thrombocytopenia, plt 77, no signs of bleeding    Pt has been placed on eliquis for DVT ppx given paroxysmal Afib    Severe pain in the right hip, not willing to move it. She describes the pain as 8-10/10 constant, worse with movement    She complains of itching all over her body, has been going on for 2 years, currently taking atarax and benadryl as outpt which she states was helping with the itching  Also complains of sig dry mouth      ROS: resistant to answering most questions, anything that challenges her  Gen: No fatigue, confusion, significant weight loss  Eyes: no blurry vision, double vision or pain in eyes  ENT: no changes in hearing, runny nose, nose bleeds, sinus pain  Endo: no episodes of low blood sugar  CV: No CP, palpitations,  Lungs: no shortness of breath, changes in secretions, changes in cough, pain with coughing  Abd: No abd pain, nausea, vomiting, pain with eating  : no blood in urine, suprapubic pain  Ext/MSK: No swelling in legs, asymmetric atrophy  Neuro: no changes in strength or sensation  Skin: no new ulcers/skin breakdown appreciated by patient  Mood: No changes in mood today, increase in depression or  anxiety  Heme: no bruising, or bleeding  Allergy: No recent allergies    PMH:  Past Medical History:   Diagnosis Date   • Arthritis    • Bronchitis    • Bruxism    • CATARACT    • Chickenpox    • COPD (chronic obstructive pulmonary disease) (HCC)    • Fibromyalgia    • [place] measles    • Hyperlipidemia    • Pericarditis 1960s   • Pneumonia    • Restless leg syndrome    • Sleep apnea     stopped cpap due to sinus problems   • Snoring     sleep study done   • Unspecified disorder of thyroid        PSH:  Past Surgical History:   Procedure Laterality Date   • HIP CANNULATED SCREW Right 11/5/2018    Procedure: CLOSED REDUCTION PERCUTANEOUS PINNING;  Surgeon: Ej Flaherty M.D.;  Location: SURGERY [facility];  Service: Orthopedics   • CATARACT PHACO WITH IOL  12/11/2013    Performed by Oma Dockery M.D. at SURGERY SAME DAY [facility]   • FEMUR ORIF  5/22/2013    Performed by Johan Tsai M.D. at SURGERY [facility]   • APPENDECTOMY     • HIP REPLACEMENT, TOTAL     • OTHER      eyelids fixed drooped below pupil   • PB REMV 2ND CATARACT,CORN-SCLER SECTN     • THYROIDECTOMY     • TONSILLECTOMY AND ADENOIDECTOMY         FHX:  Family History   Problem Relation Age of Onset   • Cancer Sister    • Psychiatry Father         suicide   • Heart Disease Son         extra heart beat   • Sleep Apnea Neg Hx        Medications:  Current Facility-Administered Medications   Medication Dose   • hydrOXYzine HCl (ATARAX) tablet 10 mg  10 mg   • levothyroxine (SYNTHROID) tablet 100 mcg  100 mcg   • cyanocobalamin (VITAMIN B-12) tablet 1,000 mcg  1,000 mcg   • Influenza Vaccine High-Dose pf injection 0.5 mL  0.5 mL   • hydrocortisone 1 % cream     • apixaban (ELIQUIS) tablet 2.5 mg  2.5 mg   • Pharmacy Consult Request ...Pain Management Review 1 Each  1 Each   • ondansetron (ZOFRAN) syringe/vial injection 4 mg  4 mg   • dexamethasone (DECADRON) injection 4 mg  4 mg   • haloperidol lactate (HALDOL) injection 1 mg  1 mg   •  scopolamine (TRANSDERM-SCOP) patch 1 Patch  1 Patch   • docusate sodium (COLACE) capsule 100 mg  100 mg   • senna-docusate (PERICOLACE or SENOKOT S) 8.6-50 MG per tablet 1 Tab  1 Tab   • senna-docusate (PERICOLACE or SENOKOT S) 8.6-50 MG per tablet 1 Tab  1 Tab   • polyethylene glycol/lytes (MIRALAX) PACKET 1 Packet  1 Packet   • magnesium hydroxide (MILK OF MAGNESIA) suspension 30 mL  30 mL   • bisacodyl (DULCOLAX) suppository 10 mg  10 mg   • fleet enema 133 mL  1 Each   • acetaminophen (TYLENOL) tablet 650 mg  650 mg   • oxyCODONE immediate-release (ROXICODONE) tablet 5 mg  5 mg   • oxyCODONE immediate release (ROXICODONE) tablet 10 mg  10 mg   • HYDROmorphone pf (DILAUDID) injection 0.5 mg  0.5 mg   • tramadol (ULTRAM) 50 MG tablet 50 mg  50 mg   • ondansetron (ZOFRAN ODT) dispertab 4 mg  4 mg   • acetaminophen (TYLENOL) tablet 650 mg  650 mg       Allergies:  Allergies   Allergen Reactions   • Other Environmental Shortness of Breath     Smoke, perfume       Social Hx:  Pre-morbidly, this patient lived in a 2nd level home with flight of stairs to enter. Lives alone    Tobacco: none  Alcohol: occasional  Drugs: none    Prior level of function:   Independent    Current level of function:  The patient was evaluated by acute care Physical Therapy and Occupational Therapy; currently requiring maximal assistance for mobility and maximal assistance for ADLs    Occupational Therapy Treatment completed with focus on ADLs, ADL transfers, patient education and upper extremity function.     Patient seen for OT Treat focused on EOB and seated ADLs necessitating Max A ADLs and Mod/Min A mobility with FWW / HHA. Patient limited by pain and fatigue. Patient agreeable with encouragement and performed therex seated between ADL rest breaks. Patient would benefit from continued skilled OT in this setting followed by post acute placement.     Physical Therapy Evaluation completed.   Bed Mobility:  Supine to Sit: Maximal  "Assist  Transfers: Sit to Stand: Moderate Assist  Gait: Level Of Assist: Moderate Assist with Front-Wheel Walker    Physical Exam:  Vitals: Blood pressure 127/63, pulse 70, temperature 36.9 °C (98.4 °F), resp. rate 16, height 1.626 m (5' 4\"), weight 55 kg (121 lb 4.1 oz), SpO2 99 %, not currently breastfeeding.  Gen: NAD  HEENT: NC/AT, PERRLA, moist mucous membranes  Cardio: RRR, no mumurs  Pulm: CTAB, with normal respiratory effort  Abd: Soft NTND, active bowel sounds,   Ext: No peripheral edema. No calf tenderness. No clubbing/cyanosis. +dorsal pedalis pulses bilaterally.  Skin: rash on chest and LE    Mental status:  A&Ox4 (person, place, date, situation) answers questions appropriately  Speech: fluent, no aphasia or dysarthria    CRANIAL NERVES:  2,3: visual acuity grossly intact, PERRL  3,4,6: EOMI bilaterally, no nystagmus or diplopia  5: sensation intact to light touch bilaterally and symmetric  7: no facial asymmetry  8: hearing grossly intact  9,10: symmetric palate elevation  11: SCM/Trapezius strength 5/5 bilaterally  12: tongue protrudes midline    Motor:  Unwilling to move right leg    Sensory:   intact to light touch through out      DTRs: 2+ in bilateral biceps, triceps, brachioradialis, 2+ in bilateral patellar and achilles tendons  No clonus at bilateral ankles  Negative babinski b/l  Negative Schaffer b/l     Tone: no spasticity noted, no cogwheeling noted  Labs:  Recent Labs      11/06/18   0434  11/07/18   0458   RBC  3.16*  3.17*   HEMOGLOBIN  9.6*  9.8*   HEMATOCRIT  29.7*  29.9*   PLATELETCT  87*  77*     Recent Labs      11/06/18   0434   SODIUM  138   POTASSIUM  4.2   CHLORIDE  107   CO2  27   GLUCOSE  125*   BUN  14   CREATININE  0.71   CALCIUM  8.0*     No results found for this or any previous visit (from the past 24 hour(s)).    ASSESSMENT:    The patient is a 89 y.o. female with a past medical history of paroxysmal A.fib, prior CVA, hypothyroidism, presented on 11/4/2018 after GLF " resulting in right hip pain found to have right hip fx    Right femoral neck Fx:  - s/p closed reduction and percutaneous pinning 11/5  - WBAT    Prior CVA: unclear residuals  - was able to take care of herself before this    Thrombocytopenia: plt 77  - Will need close observation with daily CBC, consider changing eliquis to xerelto   - + itching, consider ITP/TTP work up    Rehab: impaired adl  - would recommend SNF given poor right leg movement, no safe dispo to community, and inability to participate    Discussed with pt and family, summarized hospitalization and care, options for next step of care      Sergo Chappell D.O.  Physical Medicine and Rehabilitation

## 2018-11-08 NOTE — DISCHARGE PLANNING
Dr. Chappell recommending skilled nursing for post acute care. Please see Dr. Chappell full consult note for specifics.

## 2018-11-08 NOTE — CARE PLAN
Problem: Communication  Goal: The ability to communicate needs accurately and effectively will improve  Outcome: PROGRESSING AS EXPECTED  Discussed POC with patient , patient agrees to current POC.     Problem: Safety  Goal: Will remain free from injury  Outcome: PROGRESSING AS EXPECTED  Fall risk assessed and precautions implemented. Patient educated and verbalized understanding. Patient is calling appropriately and remains free from injury. Call light is within reach.

## 2018-11-08 NOTE — DISCHARGE INSTRUCTIONS
Discharge Instructions    Discharged to home by car with relative. Discharged via wheelchair, hospital escort: Yes.  Special equipment needed: Not Applicable and Wheelchair    Be sure to schedule a follow-up appointment with your primary care doctor or any specialists as instructed.     Discharge Plan:   Diet Plan: Discussed  Activity Level: Discussed  Confirmed Follow up Appointment: Patient to Call and Schedule Appointment  Confirmed Symptoms Management: Discussed  Medication Reconciliation Updated: Yes  Influenza Vaccine Indication: Indicated: 65 years and older    I understand that a diet low in cholesterol, fat, and sodium is recommended for good health. Unless I have been given specific instructions below for another diet, I accept this instruction as my diet prescription.   Other diet: reg    Special Instructions: Discharge instructions for the Orthopedic Patient    Follow up with Primary Care Physician within 2 weeks of discharge to home, regarding:  Review of medications and diagnostic testing.  Surveillance for medical complications.  Workup and treatment of osteoporosis, if appropriate.     -Is this a Joint Replacement patient? No    -Is this patient being discharged with medication to prevent blood clots?  Yes, Xarelto Rivaroxaban oral tablets  What is this medicine?  RIVAROXABAN (ri va LUIS ANTONIO a ban) is an anticoagulant (blood thinner). It is used to treat blood clots in the lungs or in the veins. It is also used after knee or hip surgeries to prevent blood clots. It is also used to lower the chance of stroke in people with a medical condition called atrial fibrillation.  This medicine may be used for other purposes; ask your health care provider or pharmacist if you have questions.  COMMON BRAND NAME(S): Xarelto, Xarelto Starter Pack  What should I tell my health care provider before I take this medicine?  They need to know if you have any of these conditions:  -bleeding disorders  -bleeding in the  brain  -blood in your stools (black or tarry stools) or if you have blood in your vomit  -history of stomach bleeding  -kidney disease  -liver disease  -low blood counts, like low white cell, platelet, or red cell counts  -recent or planned spinal or epidural procedure  -take medicines that treat or prevent blood clots  -an unusual or allergic reaction to rivaroxaban, other medicines, foods, dyes, or preservatives  -pregnant or trying to get pregnant  -breast-feeding  How should I use this medicine?  Take this medicine by mouth with a glass of water. Follow the directions on the prescription label. Take your medicine at regular intervals. Do not take it more often than directed. Do not stop taking except on your doctor's advice. Stopping this medicine may increase your risk of a blood clot. Be sure to refill your prescription before you run out of medicine.  If you are taking this medicine after hip or knee replacement surgery, take it with or without food. If you are taking this medicine for atrial fibrillation, take it with your evening meal. If you are taking this medicine to treat blood clots, take it with food at the same time each day. If you are unable to swallow your tablet, you may crush the tablet and mix it in applesauce. Then, immediately eat the applesauce. You should eat more food right after you eat the applesauce containing the crushed tablet.  Talk to your pediatrician regarding the use of this medicine in children. Special care may be needed.  Overdosage: If you think you have taken too much of this medicine contact a poison control center or emergency room at once.  NOTE: This medicine is only for you. Do not share this medicine with others.  What if I miss a dose?  If you take your medicine once a day and miss a dose, take the missed dose as soon as you remember. If you take your medicine twice a day and miss a dose, take the missed dose immediately. In this instance, 2 tablets may be taken at  the same time. The next day you should take 1 tablet twice a day as directed.  What may interact with this medicine?  Do not take this medicine with any of the following medications:  -defibrotide  This medicine may also interact with the following medications:  -aspirin and aspirin-like medicines  -certain antibiotics like erythromycin, azithromycin, and clarithromycin  -certain medicines for fungal infections like ketoconazole and itraconazole  -certain medicines for irregular heart beat like amiodarone, quinidine, dronedarone  -certain medicines for seizures like carbamazepine, phenytoin  -certain medicines that treat or prevent blood clots like warfarin, enoxaparin, and dalteparin  -conivaptan  -diltiazem  -felodipine  -indinavir  -lopinavir; ritonavir  -NSAIDS, medicines for pain and inflammation, like ibuprofen or naproxen  -ranolazine  -rifampin  -ritonavir  -SNRIs, medicines for depression, like desvenlafaxine, duloxetine, levomilnacipran, venlafaxine  -SSRIs, medicines for depression, like citalopram, escitalopram, fluoxetine, fluvoxamine, paroxetine, sertraline  -Selma's wort  -verapamil  This list may not describe all possible interactions. Give your health care provider a list of all the medicines, herbs, non-prescription drugs, or dietary supplements you use. Also tell them if you smoke, drink alcohol, or use illegal drugs. Some items may interact with your medicine.  What should I watch for while using this medicine?  Visit your doctor or health care professional for regular checks on your progress.  Notify your doctor or health care professional and seek emergency treatment if you develop breathing problems; changes in vision; chest pain; severe, sudden headache; pain, swelling, warmth in the leg; trouble speaking; sudden numbness or weakness of the face, arm or leg. These can be signs that your condition has gotten worse.  If you are going to have surgery or other procedure, tell your doctor that  you are taking this medicine.  What side effects may I notice from receiving this medicine?  Side effects that you should report to your doctor or health care professional as soon as possible:  -allergic reactions like skin rash, itching or hives, swelling of the face, lips, or tongue  -back pain  -redness, blistering, peeling or loosening of the skin, including inside the mouth  -signs and symptoms of bleeding such as bloody or black, tarry stools; red or dark-brown urine; spitting up blood or brown material that looks like coffee grounds; red spots on the skin; unusual bruising or bleeding from the eye, gums, or nose  Side effects that usually do not require medical attention (report to your doctor or health care professional if they continue or are bothersome):  -dizziness  -muscle pain  This list may not describe all possible side effects. Call your doctor for medical advice about side effects. You may report side effects to FDA at 7-978-FDA-8375.  Where should I keep my medicine?  Keep out of the reach of children.  Store at room temperature between 15 and 30 degrees C (59 and 86 degrees F). Throw away any unused medicine after the expiration date.  NOTE: This sheet is a summary. It may not cover all possible information. If you have questions about this medicine, talk to your doctor, pharmacist, or health care provider.  © 2018 Elsevier/Gold Standard (2017-09-06 16:29:33)      · Is patient discharged on Warfarin / Coumadin?   No     Depression / Suicide Risk    As you are discharged from this [facility] facility, it is important to learn how to keep safe from harming yourself.    Recognize the warning signs:  · Abrupt changes in personality, positive or negative- including increase in energy   · Giving away possessions  · Change in eating patterns- significant weight changes-  positive or negative  · Change in sleeping patterns- unable to sleep or sleeping all the time   · Unwillingness or inability to  communicate  · Depression  · Unusual sadness, discouragement and loneliness  · Talk of wanting to die  · Neglect of personal appearance   · Rebelliousness- reckless behavior  · Withdrawal from people/activities they love  · Confusion- inability to concentrate     If you or a loved one observes any of these behaviors or has concerns about self-harm, here's what you can do:  · Talk about it- your feelings and reasons for harming yourself  · Remove any means that you might use to hurt yourself (examples: pills, rope, extension cords, firearm)  · Get professional help from the community (Mental Health, Substance Abuse, psychological counseling)  · Do not be alone:Call your Safe Contact- someone whom you trust who will be there for you.  · Call your local CRISIS HOTLINE 478-8752 or 896-610-0026  · Call your local Children's Mobile Crisis Response Team [place] (302) 899-7879 or www.Viewfinity  · Call the toll free National Suicide Prevention Hotlines   · National Suicide Prevention Lifeline 279-463-ZAWR (9835)  · National Hope Line Network 800-SUICIDE (922-6104)

## 2018-11-08 NOTE — DISCHARGE PLANNING
Received Transport Form @ 1438  Spoke to Melina @ [facility]    Transport is scheduled for [facility] @1600 going to [facility].  DC summary faxed to [facility].

## 2018-11-08 NOTE — DISCHARGE PLANNING
Unilateral hip fracture with ongoing medical  management as well as therapy need. Anticipate post acute services to facilitate a successful transition to community home with outpatient support. Please consider a PMR referral to assist with discharge planning.

## 2018-11-08 NOTE — DISCHARGE SUMMARY
Discharge Summary    CHIEF COMPLAINT ON ADMISSION  Chief Complaint   Patient presents with   • T-5000 GLF     ambulates with cane, lost balance and fell on right hip   • Hip Pain     right hip, no deformity noted, R pedal pulse 1+       Reason for Admission  EMS     CODE STATUS  DNAR/DNI    HPI & HOSPITAL COURSE  This is a 89 y.o. female here with above medical issues. Patient was found to have right hip pain after sustaining a mechanical fall. Admission x-rays showed a right femoral neck fracture. She was admitted to the general orthopedic floor and orthopedic surgery was consulted. She underwent closed reduction percutaneous pinning which was successful. Her outpatient eliquis was initially held and then re-started post operatively on day #1 and she tolerated it well with no clear blood loss. Patient has chronic itchy skin that has been there for several years with no clear etiology. 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 Her skin did not exhibit any dermatographism. I would not escalate her hydroxyzine dosing given her age and there is NO evidence this is a bed bug infestation. She will need outpatient dermatology consult and possible random skin biopsies at that time. No evidence of liver or kidney failure at this time. She is now medically stable for transfer to a SNF.  Also, of note she had normal eosinophil count on CBC differential.       Therefore, she is discharged in fair and stable condition to skilled nursing facility.    The patient met 2-midnight criteria for an inpatient stay at the time of discharge.      FOLLOW UP ITEMS POST DISCHARGE  F/U with her PCP in 1-2weeks  F/U with Dr Flaherty of orthopedic surgery in 1-2 weeks  F/U with dermatology in 1 month    DISCHARGE DIAGNOSES  Principal Problem (Resolved):    Closed fracture of neck of right femur (HCC) POA: Yes  Active Problems:    Frequent falls POA: Yes    Hypertension POA: Yes     History of CVA (cerebrovascular accident) POA: Yes    PAF (paroxysmal atrial fibrillation) (HCC) POA: Yes    Hypothyroidism POA: Yes    Vitamin B12 deficiency POA: Yes    Thrombocytopenia (HCC) POA: Yes    Itchy skin POA: Yes      FOLLOW UP  Future Appointments  Date Time Provider Department Center   12/10/2018 10:40 AM OLIVE Parker None     No follow-up provider specified.    MEDICATIONS ON DISCHARGE     Medication List      START taking these medications      Instructions   cyanocobalamin 1000 MCG Tabs  Start taking on:  11/9/2018  Commonly known as:  VITAMIN B12   Take 1 Tab by mouth every day.  Dose:  1000 mcg     ondansetron 4 MG Tbdp  Commonly known as:  ZOFRAN ODT   Take 1 Tab by mouth every four hours as needed (give PO if IV route is unavailable. May give per feeding tube.).  Dose:  4 mg     oxyCODONE immediate-release 5 MG Tabs  Commonly known as:  ROXICODONE   Take 1 Tab by mouth every 3 hours as needed for up to 3 days.  Dose:  5 mg     senna-docusate 8.6-50 MG Tabs  Commonly known as:  PERICOLACE or SENOKOT S   Take 1 Tab by mouth every day.  Dose:  1 Tab        CONTINUE taking these medications      Instructions   apixaban 2.5mg Tabs  Commonly known as:  ELIQUIS   Doctor's comments:  Please provide samples.  Take 1 Tab by mouth 2 Times a Day.  Dose:  2.5 mg     Clobetasol Propionate 0.05 % Lotn   Apply twice daily to affected areas     hydrOXYzine HCl 25 MG Tabs  Commonly known as:  ATARAX   Take 1 Tab by mouth 1 time daily as needed for Itching.  Dose:  25 mg     levothyroxine 150 MCG Tabs  Commonly known as:  SYNTHROID   Doctor's comments:  Dc 75 mcg dose  Take 1 Tab by mouth Every morning on an empty stomach.  Dose:  150 mcg     MIRALAX Powd  Generic drug:  polyethylene glycol 3350   Take 17 g by mouth every day.  Dose:  17 g            Allergies  Allergies   Allergen Reactions   • Other Environmental Shortness of Breath     Smoke, perfume       DIET  Orders Placed This Encounter    Procedures   • Diet Order Regular     Standing Status:   Standing     Number of Occurrences:   1     Order Specific Question:   Diet:     Answer:   Regular [1]       ACTIVITY  As tolerated and directed by skilled nursing.  Weight bearing as tolerated    LINES, DRAINS, AND WOUNDS  This is an automated list. Peripheral IVs will be removed prior to discharge.  Peripheral IV 11/04/18 20 G Right Antecubital (Active)   Site Assessment Clean;Dry;Intact 11/7/2018 11:21 PM   Dressing Type Transparent 11/7/2018 11:21 PM   Line Status Saline locked 11/7/2018 11:21 PM   Dressing Status Intact;Dry;Clean 11/7/2018 11:21 PM   Dressing Intervention N/A 11/7/2018 11:21 PM   NEXT Primary Tubing Change  11/07/18 11/6/2018  6:00 AM   Infiltration Grading ([facility], [facility]) 0 11/7/2018 11:21 PM   Phlebitis Scale ([facility] Only) 0 11/7/2018 11:21 PM       Abrasion / Burn Partial Thickness Abrasion Less than 9% Buttock (Active)       Incision  Left Lateral;Upper Leg (Active)       Peripheral IV 11/04/18 20 G Right Antecubital (Active)   Site Assessment Clean;Dry;Intact 11/7/2018 11:21 PM   Dressing Type Transparent 11/7/2018 11:21 PM   Line Status Saline locked 11/7/2018 11:21 PM   Dressing Status Intact;Dry;Clean 11/7/2018 11:21 PM   Dressing Intervention N/A 11/7/2018 11:21 PM   NEXT Primary Tubing Change  11/07/18 11/6/2018  6:00 AM   Infiltration Grading ([facility]) 0 11/7/2018 11:21 PM   Phlebitis Scale ([facility] Only) 0 11/7/2018 11:21 PM               MENTAL STATUS ON TRANSFER  Level of Consciousness: Alert  Orientation : Oriented x 4  Speech: Speech Clear    CONSULTATIONS  ORTHO    PROCEDURES  See above    DX-HIP-UNILATERAL-W/O PELVIS-2/3 VIEWS RIGHT   Final Result      Intraoperative evaluation of ORIF of right femoral neck fracture.      DX-PORTABLE FLUORO > 1 HOUR   Final Result      Intraoperative evaluation of ORIF of right femoral neck fracture.      DX-KNEE 2- RIGHT   Final Result      No evidence of fracture or dislocation.    Mild osteoarthritis.      DX-CHEST-PORTABLE (1 VIEW)   Final Result         No acute cardiac or pulmonary abnormality is identified.      CT-PELVIS W/O   Final Result      1.  Impacted acute versus subacute fracture of the subcapital right femoral neck does appear to be present.      2.  Left hip arthroplasty noted in place.      DX-PELVIS-1 OR 2 VIEWS   Final Result      1.  No acute fracture or dislocation is identified.   2.  Osteopenia and mild osteoarthritis.      DX-FEMUR-2+ RIGHT   Final Result      No radiographic evidence of acute traumatic injury.            LABORATORY  Lab Results   Component Value Date    SODIUM 138 11/06/2018    POTASSIUM 4.2 11/06/2018    CHLORIDE 107 11/06/2018    CO2 27 11/06/2018    GLUCOSE 125 (H) 11/06/2018    BUN 14 11/06/2018    CREATININE 0.71 11/06/2018    CREATININE 0.7 01/29/2007        Lab Results   Component Value Date    WBC 5.3 11/07/2018    HEMOGLOBIN 9.8 (L) 11/07/2018    HEMATOCRIT 29.9 (L) 11/07/2018    PLATELETCT 77 (L) 11/07/2018        Total time of the discharge process exceeds 36 minutes.

## 2018-11-08 NOTE — DISCHARGE PLANNING
Plan or Request:  [facility] informed patient of Medicare right to appeal discharge. Patient and son expressed understanding. IMM signed.     IMM filed in patient's chart.    Patient and son notified of transportation time to [facility] at 1600.

## 2018-11-08 NOTE — DISCHARGE PLANNING
Agency/Facility Name: [facility]  Fax Notification   Outcome: Asking about bed bugs    Agency/Facility Name: [facility]  Spoke To: Melina  Outcome: Per Dr. Bender patient does not have bed bugs. Melina will need this documented in a note and faxed. Referral is accepted.

## 2018-11-09 NOTE — PROGRESS NOTES
Discharge instructions given, flu shot given, d/c instructions signed. Pt up to bedside commode 1person assist.    Transfer packet at bedside. Narcotic consent signed and oxycodone rx in packet. IV removed.

## 2018-11-16 ENCOUNTER — NON-PROVIDER VISIT (OUTPATIENT)
Dept: CARDIOLOGY | Facility: MEDICAL CENTER | Age: 83
End: 2018-11-16
Payer: MEDICARE

## 2018-11-16 DIAGNOSIS — Z95.818 STATUS POST PLACEMENT OF IMPLANTABLE LOOP RECORDER: ICD-10-CM

## 2018-11-16 PROCEDURE — 93298 REM INTERROG DEV EVAL SCRMS: CPT | Performed by: INTERNAL MEDICINE

## 2018-11-16 NOTE — NON-PROVIDER
30 Day Billing Report   Type of monitoring: Remote/ Carelink     : [employer]     Date of Transmission: 11/16/18     Type of device: Loop Recorder     Episodes: 0.7 % lifetime  Last episode recorded: AT/AF Daily [place] > Threshold, most recent: 01-Nov-2018  - Average V. Rate during AT/AF > Threshold, most recent: 01-Nov-2018 - Maximum Episode Count Met, most recent: 03-Nov-2018

## 2018-11-18 LAB — EKG IMPRESSION: NORMAL

## 2018-11-20 ENCOUNTER — PATIENT OUTREACH (OUTPATIENT)
Dept: HEALTH INFORMATION MANAGEMENT | Facility: OTHER | Age: 83
End: 2018-11-20

## 2018-11-20 ASSESSMENT — PAIN SCALES - GENERAL: PAINLEVEL: 5=MODERATE PAIN

## 2018-11-21 ASSESSMENT — PATIENT HEALTH QUESTIONNAIRE - PHQ9: CLINICAL INTERPRETATION OF PHQ2 SCORE: 1

## 2018-11-21 ASSESSMENT — ENCOUNTER SYMPTOMS
DEPRESSION: 1
OCCASIONAL FEELINGS OF UNSTEADINESS: 1
LOSS OF SENSATION IN FEET: 0

## 2018-11-21 NOTE — PROGRESS NOTES
"TCN met patient at bedside in [facility] on 11/20/18; TCN introduced self, explained role/program; patient is agreeable to our services and open to a home visit upon DC from SNF, if necessary. She was open to talking about her current situation; however, she stated she was tired and \"over did it with physical therapy earlier today\".  We completed most of the Intake Assessment (see input results), except the Depression Screening as she requested I get her nurse, due to increased right hip pain, and she wanted pain meds and to rest. It was noted in the 9-point screening that she answered \"yes\" to \"difficulty finding kenn and happiness during the day\", stating that since July 2018 when she fell at home and was hospitalized \"it's gone downhill from there\".  She expressed frustration and sadness about loosing her 's license, on her birthday in July this year, when it was due to be renewed.  She is happy about her niece, Noah, coming from [place]  recently to see her and says she will be in [place] \"for a couple weeks\".      BRENDAN did let her RN know patient was requesting pain meds for increased pain, 5/10; he got her meds right away.  TCN let her rest at that point, and let her know I would follow-up with her next week.    PLAN: TCN will continue to follow while in SNF and will schedule an appropriate date for a home visit, if appropriate, when discharged home.  "

## 2019-02-25 NOTE — ED NOTES
Patient medicated per ERP order, see MAR.    Patient to imaging.   Please advise on refill request.   Patient went to [facility] on 1/31/19. Instructed to take Mefloquine weekly for two weeks prior to departure and weekly for four weeks after return, see 1/15/19 telephone encounter.      Recent Outpatient Visits            3 mo

## 2021-01-14 DIAGNOSIS — Z23 NEED FOR VACCINATION: ICD-10-CM

## 2022-02-15 NOTE — PROGRESS NOTES
0650- Report recevied from bedside RN. Patient laying down in bed, no acute complaints at this time, care board updated. POC discussed; possible SNF transfer today.    Abdomen , soft, nontender, nondistended , no guarding or rigidity , no masses palpable , normal bowel sounds , Liver and Spleen , no hepatomegaly present , no hepatosplenomegaly , liver nontender , spleen not palpable
